# Patient Record
Sex: MALE | Race: WHITE | Employment: OTHER | URBAN - METROPOLITAN AREA
[De-identification: names, ages, dates, MRNs, and addresses within clinical notes are randomized per-mention and may not be internally consistent; named-entity substitution may affect disease eponyms.]

---

## 2017-02-10 ENCOUNTER — OFFICE VISIT (OUTPATIENT)
Dept: ORTHOPEDIC SURGERY | Age: 77
End: 2017-02-10

## 2017-02-10 VITALS — WEIGHT: 179.9 LBS | HEIGHT: 66 IN | BODY MASS INDEX: 28.91 KG/M2

## 2017-02-10 DIAGNOSIS — M54.2 NECK PAIN: ICD-10-CM

## 2017-02-10 DIAGNOSIS — M47.812 SPONDYLOSIS OF CERVICAL REGION WITHOUT MYELOPATHY OR RADICULOPATHY: ICD-10-CM

## 2017-02-10 DIAGNOSIS — R20.2 PARESTHESIA OF RIGHT UPPER EXTREMITY: ICD-10-CM

## 2017-02-10 DIAGNOSIS — M50.30 DDD (DEGENERATIVE DISC DISEASE), CERVICAL: Primary | ICD-10-CM

## 2017-02-10 PROCEDURE — 72040 X-RAY EXAM NECK SPINE 2-3 VW: CPT | Performed by: PHYSICAL MEDICINE & REHABILITATION

## 2017-02-10 PROCEDURE — 99204 OFFICE O/P NEW MOD 45 MIN: CPT | Performed by: PHYSICAL MEDICINE & REHABILITATION

## 2017-02-14 ENCOUNTER — OFFICE VISIT (OUTPATIENT)
Dept: CARDIOLOGY CLINIC | Age: 77
End: 2017-02-14

## 2017-02-14 VITALS
HEIGHT: 66 IN | BODY MASS INDEX: 29.73 KG/M2 | WEIGHT: 185 LBS | HEART RATE: 64 BPM | DIASTOLIC BLOOD PRESSURE: 72 MMHG | OXYGEN SATURATION: 95 % | SYSTOLIC BLOOD PRESSURE: 140 MMHG

## 2017-02-14 DIAGNOSIS — E78.2 MIXED HYPERLIPIDEMIA: Chronic | ICD-10-CM

## 2017-02-14 DIAGNOSIS — I25.10 CORONARY ARTERY DISEASE INVOLVING NATIVE CORONARY ARTERY OF NATIVE HEART WITHOUT ANGINA PECTORIS: Chronic | ICD-10-CM

## 2017-02-14 DIAGNOSIS — I10 ESSENTIAL HYPERTENSION: Primary | Chronic | ICD-10-CM

## 2017-02-14 PROCEDURE — 99214 OFFICE O/P EST MOD 30 MIN: CPT | Performed by: INTERNAL MEDICINE

## 2017-02-14 RX ORDER — CHLORTHALIDONE 25 MG/1
TABLET ORAL
Qty: 30 TABLET | Refills: 6 | Status: SHIPPED | OUTPATIENT
Start: 2017-02-14 | End: 2017-02-21

## 2017-02-15 ENCOUNTER — TELEPHONE (OUTPATIENT)
Dept: ORTHOPEDIC SURGERY | Age: 77
End: 2017-02-15

## 2017-02-17 ENCOUNTER — OFFICE VISIT (OUTPATIENT)
Dept: ORTHOPEDIC SURGERY | Age: 77
End: 2017-02-17

## 2017-02-17 VITALS
WEIGHT: 184.97 LBS | HEIGHT: 66 IN | SYSTOLIC BLOOD PRESSURE: 194 MMHG | BODY MASS INDEX: 29.73 KG/M2 | HEART RATE: 65 BPM | DIASTOLIC BLOOD PRESSURE: 87 MMHG

## 2017-02-17 DIAGNOSIS — G95.89 CERVICAL CORD MYELOMALACIA (HCC): Primary | ICD-10-CM

## 2017-02-17 DIAGNOSIS — M25.512 CHRONIC PAIN OF BOTH SHOULDERS: ICD-10-CM

## 2017-02-17 DIAGNOSIS — M25.511 CHRONIC PAIN OF BOTH SHOULDERS: ICD-10-CM

## 2017-02-17 DIAGNOSIS — G89.29 CHRONIC PAIN OF BOTH SHOULDERS: ICD-10-CM

## 2017-02-17 DIAGNOSIS — M54.2 NECK PAIN: ICD-10-CM

## 2017-02-17 DIAGNOSIS — G95.20 CERVICAL SPINAL CORD COMPRESSION (HCC): ICD-10-CM

## 2017-02-17 PROCEDURE — 99213 OFFICE O/P EST LOW 20 MIN: CPT | Performed by: PHYSICAL MEDICINE & REHABILITATION

## 2017-02-17 RX ORDER — ATENOLOL 25 MG/1
25 TABLET ORAL DAILY
Qty: 90 TABLET | Refills: 3 | Status: SHIPPED | OUTPATIENT
Start: 2017-02-17 | End: 2018-02-28

## 2017-02-17 RX ORDER — ALLOPURINOL 100 MG/1
150 TABLET ORAL DAILY
Qty: 135 TABLET | Refills: 3 | Status: SHIPPED | OUTPATIENT
Start: 2017-02-17 | End: 2017-12-26 | Stop reason: SDUPTHER

## 2017-02-17 RX ORDER — GLIPIZIDE AND METFORMIN HCL 5; 500 MG/1; MG/1
TABLET, FILM COATED ORAL
Qty: 135 TABLET | Refills: 3 | Status: SHIPPED | OUTPATIENT
Start: 2017-02-17 | End: 2017-10-16 | Stop reason: SDUPTHER

## 2017-02-17 RX ORDER — LISINOPRIL 20 MG/1
20 TABLET ORAL DAILY
Qty: 90 TABLET | Refills: 3 | Status: SHIPPED | OUTPATIENT
Start: 2017-02-17 | End: 2017-12-26 | Stop reason: SDUPTHER

## 2017-02-17 RX ORDER — ATORVASTATIN CALCIUM 20 MG/1
20 TABLET, FILM COATED ORAL DAILY
Qty: 90 TABLET | Refills: 3 | Status: SHIPPED | OUTPATIENT
Start: 2017-02-17 | End: 2017-12-26 | Stop reason: SDUPTHER

## 2017-02-17 RX ORDER — LEVOTHYROXINE SODIUM 88 UG/1
88 TABLET ORAL DAILY
Qty: 90 TABLET | Refills: 3 | Status: SHIPPED | OUTPATIENT
Start: 2017-02-17 | End: 2017-12-26 | Stop reason: SDUPTHER

## 2017-02-20 ENCOUNTER — OFFICE VISIT (OUTPATIENT)
Dept: ORTHOPEDIC SURGERY | Age: 77
End: 2017-02-20

## 2017-02-20 VITALS
HEART RATE: 60 BPM | TEMPERATURE: 98 F | WEIGHT: 184 LBS | SYSTOLIC BLOOD PRESSURE: 164 MMHG | DIASTOLIC BLOOD PRESSURE: 81 MMHG | BODY MASS INDEX: 29.57 KG/M2 | HEIGHT: 66 IN

## 2017-02-20 DIAGNOSIS — M48.02 CERVICAL STENOSIS OF SPINAL CANAL: Primary | ICD-10-CM

## 2017-02-20 PROCEDURE — 99214 OFFICE O/P EST MOD 30 MIN: CPT | Performed by: ORTHOPAEDIC SURGERY

## 2017-02-21 ENCOUNTER — OFFICE VISIT (OUTPATIENT)
Dept: INTERNAL MEDICINE | Age: 77
End: 2017-02-21

## 2017-02-21 VITALS
HEIGHT: 66 IN | RESPIRATION RATE: 12 BRPM | WEIGHT: 185 LBS | BODY MASS INDEX: 29.73 KG/M2 | HEART RATE: 84 BPM | SYSTOLIC BLOOD PRESSURE: 128 MMHG | DIASTOLIC BLOOD PRESSURE: 80 MMHG

## 2017-02-21 DIAGNOSIS — I10 ESSENTIAL HYPERTENSION: Primary | Chronic | ICD-10-CM

## 2017-02-21 PROCEDURE — 99213 OFFICE O/P EST LOW 20 MIN: CPT | Performed by: INTERNAL MEDICINE

## 2017-02-21 RX ORDER — CITALOPRAM 20 MG/1
20 TABLET ORAL DAILY
Qty: 90 TABLET | Refills: 3 | Status: SHIPPED | OUTPATIENT
Start: 2017-02-21 | End: 2017-07-28

## 2017-02-21 RX ORDER — AMLODIPINE BESYLATE 2.5 MG/1
2.5 TABLET ORAL DAILY
Qty: 90 TABLET | Refills: 3 | Status: SHIPPED | OUTPATIENT
Start: 2017-02-21 | End: 2019-01-17 | Stop reason: SDUPTHER

## 2017-02-22 ENCOUNTER — TELEPHONE (OUTPATIENT)
Dept: ORTHOPEDIC SURGERY | Age: 77
End: 2017-02-22

## 2017-03-03 ENCOUNTER — TELEPHONE (OUTPATIENT)
Dept: INTERNAL MEDICINE | Age: 77
End: 2017-03-03

## 2017-04-27 ENCOUNTER — OFFICE VISIT (OUTPATIENT)
Dept: INTERNAL MEDICINE | Age: 77
End: 2017-04-27

## 2017-04-27 VITALS
HEIGHT: 66 IN | HEART RATE: 64 BPM | SYSTOLIC BLOOD PRESSURE: 132 MMHG | RESPIRATION RATE: 12 BRPM | BODY MASS INDEX: 29.09 KG/M2 | WEIGHT: 181 LBS | DIASTOLIC BLOOD PRESSURE: 78 MMHG

## 2017-04-27 DIAGNOSIS — E11.9 TYPE 2 DIABETES MELLITUS WITHOUT COMPLICATION, WITHOUT LONG-TERM CURRENT USE OF INSULIN (HCC): ICD-10-CM

## 2017-04-27 DIAGNOSIS — I10 ESSENTIAL HYPERTENSION: Chronic | ICD-10-CM

## 2017-04-27 DIAGNOSIS — Z01.818 PRE-OP EXAM: Primary | ICD-10-CM

## 2017-04-27 PROCEDURE — 99214 OFFICE O/P EST MOD 30 MIN: CPT | Performed by: INTERNAL MEDICINE

## 2017-04-27 PROCEDURE — 93000 ELECTROCARDIOGRAM COMPLETE: CPT | Performed by: INTERNAL MEDICINE

## 2017-07-20 PROBLEM — M48.02 CERVICAL STENOSIS OF SPINAL CANAL: Status: RESOLVED | Noted: 2017-02-20 | Resolved: 2017-07-20

## 2017-07-21 ENCOUNTER — TELEPHONE (OUTPATIENT)
Dept: INTERNAL MEDICINE | Age: 77
End: 2017-07-21

## 2017-07-21 DIAGNOSIS — E03.9 HYPOTHYROIDISM, UNSPECIFIED TYPE: ICD-10-CM

## 2017-07-21 DIAGNOSIS — I25.10 CORONARY ARTERY DISEASE INVOLVING NATIVE CORONARY ARTERY OF NATIVE HEART WITHOUT ANGINA PECTORIS: Chronic | ICD-10-CM

## 2017-07-21 DIAGNOSIS — I10 ESSENTIAL HYPERTENSION: Chronic | ICD-10-CM

## 2017-07-21 DIAGNOSIS — E78.2 MIXED HYPERLIPIDEMIA: Primary | Chronic | ICD-10-CM

## 2017-07-21 DIAGNOSIS — Z12.5 SPECIAL SCREENING FOR MALIGNANT NEOPLASM OF PROSTATE: ICD-10-CM

## 2017-07-21 DIAGNOSIS — E11.9 TYPE 2 DIABETES MELLITUS WITHOUT COMPLICATION, WITHOUT LONG-TERM CURRENT USE OF INSULIN (HCC): ICD-10-CM

## 2017-07-24 DIAGNOSIS — I25.10 CORONARY ARTERY DISEASE INVOLVING NATIVE CORONARY ARTERY OF NATIVE HEART WITHOUT ANGINA PECTORIS: Chronic | ICD-10-CM

## 2017-07-24 DIAGNOSIS — Z12.5 SPECIAL SCREENING FOR MALIGNANT NEOPLASM OF PROSTATE: ICD-10-CM

## 2017-07-24 DIAGNOSIS — E11.9 TYPE 2 DIABETES MELLITUS WITHOUT COMPLICATION, WITHOUT LONG-TERM CURRENT USE OF INSULIN (HCC): ICD-10-CM

## 2017-07-24 DIAGNOSIS — R20.2 PARESTHESIA OF RIGHT UPPER EXTREMITY: ICD-10-CM

## 2017-07-24 DIAGNOSIS — I10 ESSENTIAL HYPERTENSION: Chronic | ICD-10-CM

## 2017-07-24 DIAGNOSIS — M47.812 SPONDYLOSIS OF CERVICAL REGION WITHOUT MYELOPATHY OR RADICULOPATHY: ICD-10-CM

## 2017-07-24 DIAGNOSIS — E03.9 HYPOTHYROIDISM, UNSPECIFIED TYPE: ICD-10-CM

## 2017-07-24 DIAGNOSIS — M54.2 NECK PAIN: ICD-10-CM

## 2017-07-24 DIAGNOSIS — M50.30 DDD (DEGENERATIVE DISC DISEASE), CERVICAL: ICD-10-CM

## 2017-07-24 DIAGNOSIS — E78.2 MIXED HYPERLIPIDEMIA: Chronic | ICD-10-CM

## 2017-07-24 LAB
A/G RATIO: 1.5 (ref 1.1–2.2)
ALBUMIN SERPL-MCNC: 4.1 G/DL (ref 3.4–5)
ALP BLD-CCNC: 59 U/L (ref 40–129)
ALT SERPL-CCNC: 20 U/L (ref 10–40)
ANION GAP SERPL CALCULATED.3IONS-SCNC: 15 MMOL/L (ref 3–16)
AST SERPL-CCNC: 24 U/L (ref 15–37)
BASOPHILS ABSOLUTE: 0.1 K/UL (ref 0–0.2)
BASOPHILS RELATIVE PERCENT: 0.8 %
BILIRUB SERPL-MCNC: 0.6 MG/DL (ref 0–1)
BUN BLDV-MCNC: 19 MG/DL (ref 7–20)
CALCIUM SERPL-MCNC: 9.1 MG/DL (ref 8.3–10.6)
CHLORIDE BLD-SCNC: 102 MMOL/L (ref 99–110)
CHOLESTEROL, TOTAL: 134 MG/DL (ref 0–199)
CO2: 24 MMOL/L (ref 21–32)
CREAT SERPL-MCNC: 0.9 MG/DL (ref 0.8–1.3)
EOSINOPHILS ABSOLUTE: 0.4 K/UL (ref 0–0.6)
EOSINOPHILS RELATIVE PERCENT: 5.1 %
GFR AFRICAN AMERICAN: >60
GFR NON-AFRICAN AMERICAN: >60
GLOBULIN: 2.7 G/DL
GLUCOSE BLD-MCNC: 127 MG/DL (ref 70–99)
HCT VFR BLD CALC: 40.6 % (ref 40.5–52.5)
HDLC SERPL-MCNC: 28 MG/DL (ref 40–60)
HEMOGLOBIN: 13.7 G/DL (ref 13.5–17.5)
LDL CHOLESTEROL CALCULATED: 75 MG/DL
LYMPHOCYTES ABSOLUTE: 2.7 K/UL (ref 1–5.1)
LYMPHOCYTES RELATIVE PERCENT: 36.3 %
MCH RBC QN AUTO: 32.2 PG (ref 26–34)
MCHC RBC AUTO-ENTMCNC: 33.7 G/DL (ref 31–36)
MCV RBC AUTO: 95.5 FL (ref 80–100)
MONOCYTES ABSOLUTE: 0.7 K/UL (ref 0–1.3)
MONOCYTES RELATIVE PERCENT: 9 %
NEUTROPHILS ABSOLUTE: 3.7 K/UL (ref 1.7–7.7)
NEUTROPHILS RELATIVE PERCENT: 48.8 %
PDW BLD-RTO: 15.2 % (ref 12.4–15.4)
PLATELET # BLD: 196 K/UL (ref 135–450)
PMV BLD AUTO: 8.4 FL (ref 5–10.5)
POTASSIUM SERPL-SCNC: 4.8 MMOL/L (ref 3.5–5.1)
PROSTATE SPECIFIC ANTIGEN: 0.98 NG/ML (ref 0–4)
RBC # BLD: 4.25 M/UL (ref 4.2–5.9)
SODIUM BLD-SCNC: 141 MMOL/L (ref 136–145)
TOTAL CK: 77 U/L (ref 39–308)
TOTAL PROTEIN: 6.8 G/DL (ref 6.4–8.2)
TRIGL SERPL-MCNC: 154 MG/DL (ref 0–150)
TSH SERPL DL<=0.05 MIU/L-ACNC: 1.09 UIU/ML (ref 0.27–4.2)
VLDLC SERPL CALC-MCNC: 31 MG/DL
WBC # BLD: 7.5 K/UL (ref 4–11)

## 2017-07-25 LAB
ESTIMATED AVERAGE GLUCOSE: 137 MG/DL
HBA1C MFR BLD: 6.4 %

## 2017-07-28 ENCOUNTER — OFFICE VISIT (OUTPATIENT)
Dept: INTERNAL MEDICINE | Age: 77
End: 2017-07-28

## 2017-07-28 VITALS
HEART RATE: 48 BPM | HEIGHT: 66 IN | DIASTOLIC BLOOD PRESSURE: 66 MMHG | WEIGHT: 175 LBS | RESPIRATION RATE: 12 BRPM | BODY MASS INDEX: 28.12 KG/M2 | SYSTOLIC BLOOD PRESSURE: 128 MMHG

## 2017-07-28 DIAGNOSIS — E03.9 HYPOTHYROIDISM, UNSPECIFIED TYPE: ICD-10-CM

## 2017-07-28 DIAGNOSIS — Z12.11 ENCOUNTER FOR SCREENING COLONOSCOPY: ICD-10-CM

## 2017-07-28 DIAGNOSIS — I25.10 CORONARY ARTERY DISEASE INVOLVING NATIVE CORONARY ARTERY OF NATIVE HEART WITHOUT ANGINA PECTORIS: Chronic | ICD-10-CM

## 2017-07-28 DIAGNOSIS — E11.9 TYPE 2 DIABETES MELLITUS WITHOUT COMPLICATION, WITHOUT LONG-TERM CURRENT USE OF INSULIN (HCC): ICD-10-CM

## 2017-07-28 DIAGNOSIS — I10 ESSENTIAL HYPERTENSION: Chronic | ICD-10-CM

## 2017-07-28 DIAGNOSIS — Z00.00 MEDICARE ANNUAL WELLNESS VISIT, SUBSEQUENT: Primary | ICD-10-CM

## 2017-07-28 DIAGNOSIS — E78.2 MIXED HYPERLIPIDEMIA: Chronic | ICD-10-CM

## 2017-07-28 LAB
BILIRUBIN, POC: NORMAL
BLOOD URINE, POC: NORMAL
CLARITY, POC: CLEAR
COLOR, POC: YELLOW
CREATININE URINE: 60.4 MG/DL (ref 39–259)
GLUCOSE URINE, POC: NORMAL
KETONES, POC: NORMAL
LEUKOCYTE EST, POC: NORMAL
MICROALBUMIN UR-MCNC: <1.2 MG/DL
MICROALBUMIN/CREAT UR-RTO: NORMAL MG/G (ref 0–30)
NITRITE, POC: NORMAL
PH, POC: 6.5
PROTEIN, POC: NORMAL
SPECIFIC GRAVITY, POC: 1.01
UROBILINOGEN, POC: NORMAL

## 2017-07-28 PROCEDURE — 93000 ELECTROCARDIOGRAM COMPLETE: CPT | Performed by: INTERNAL MEDICINE

## 2017-07-28 PROCEDURE — G0439 PPPS, SUBSEQ VISIT: HCPCS | Performed by: INTERNAL MEDICINE

## 2017-07-28 PROCEDURE — 81002 URINALYSIS NONAUTO W/O SCOPE: CPT | Performed by: INTERNAL MEDICINE

## 2017-07-28 RX ORDER — RANITIDINE 150 MG/1
150 TABLET ORAL
COMMUNITY
End: 2019-02-01 | Stop reason: CLARIF

## 2017-07-28 ASSESSMENT — LIFESTYLE VARIABLES
HAS A RELATIVE, FRIEND, DOCTOR, OR ANOTHER HEALTH PROFESSIONAL EXPRESSED CONCERN ABOUT YOUR DRINKING OR SUGGESTED YOU CUT DOWN: 0
HOW MANY STANDARD DRINKS CONTAINING ALCOHOL DO YOU HAVE ON A TYPICAL DAY: 0
AUDIT TOTAL SCORE: 1
HAVE YOU OR SOMEONE ELSE BEEN INJURED AS A RESULT OF YOUR DRINKING: 0
HOW OFTEN DO YOU HAVE SIX OR MORE DRINKS ON ONE OCCASION: 0
HOW OFTEN DURING THE LAST YEAR HAVE YOU BEEN UNABLE TO REMEMBER WHAT HAPPENED THE NIGHT BEFORE BECAUSE YOU HAD BEEN DRINKING: 0
HOW OFTEN DURING THE LAST YEAR HAVE YOU NEEDED AN ALCOHOLIC DRINK FIRST THING IN THE MORNING TO GET YOURSELF GOING AFTER A NIGHT OF HEAVY DRINKING: 0
HOW OFTEN DURING THE LAST YEAR HAVE YOU HAD A FEELING OF GUILT OR REMORSE AFTER DRINKING: 0
HOW OFTEN DURING THE LAST YEAR HAVE YOU FAILED TO DO WHAT WAS NORMALLY EXPECTED FROM YOU BECAUSE OF DRINKING: 0
AUDIT-C TOTAL SCORE: 1
HOW OFTEN DURING THE LAST YEAR HAVE YOU FOUND THAT YOU WERE NOT ABLE TO STOP DRINKING ONCE YOU HAD STARTED: 0
HOW OFTEN DO YOU HAVE A DRINK CONTAINING ALCOHOL: 1

## 2017-07-28 ASSESSMENT — PATIENT HEALTH QUESTIONNAIRE - PHQ9: SUM OF ALL RESPONSES TO PHQ QUESTIONS 1-9: 0

## 2017-07-28 ASSESSMENT — ANXIETY QUESTIONNAIRES: GAD7 TOTAL SCORE: 0

## 2017-09-14 ENCOUNTER — TELEPHONE (OUTPATIENT)
Dept: INTERNAL MEDICINE | Age: 77
End: 2017-09-14

## 2017-09-19 ENCOUNTER — OFFICE VISIT (OUTPATIENT)
Dept: INTERNAL MEDICINE | Age: 77
End: 2017-09-19

## 2017-09-19 ENCOUNTER — HOSPITAL ENCOUNTER (OUTPATIENT)
Dept: OTHER | Age: 77
Discharge: HOME | End: 2017-09-19
Attending: INTERNAL MEDICINE | Admitting: INTERNAL MEDICINE

## 2017-09-19 VITALS
BODY MASS INDEX: 28.93 KG/M2 | WEIGHT: 180 LBS | HEIGHT: 66 IN | DIASTOLIC BLOOD PRESSURE: 78 MMHG | HEART RATE: 66 BPM | SYSTOLIC BLOOD PRESSURE: 130 MMHG | RESPIRATION RATE: 12 BRPM

## 2017-09-19 DIAGNOSIS — M79.672 LEFT FOOT PAIN: Primary | ICD-10-CM

## 2017-09-19 DIAGNOSIS — E11.9 TYPE 2 DIABETES MELLITUS WITHOUT COMPLICATION, WITHOUT LONG-TERM CURRENT USE OF INSULIN (HCC): ICD-10-CM

## 2017-09-19 DIAGNOSIS — I10 ESSENTIAL HYPERTENSION: Chronic | ICD-10-CM

## 2017-09-19 DIAGNOSIS — M79.672 LEFT FOOT PAIN: ICD-10-CM

## 2017-09-19 PROCEDURE — 99213 OFFICE O/P EST LOW 20 MIN: CPT | Performed by: INTERNAL MEDICINE

## 2017-10-16 ENCOUNTER — TELEPHONE (OUTPATIENT)
Dept: INTERNAL MEDICINE | Age: 77
End: 2017-10-16

## 2017-10-16 RX ORDER — GLIPIZIDE AND METFORMIN HCL 5; 500 MG/1; MG/1
2 TABLET, FILM COATED ORAL
Qty: 120 TABLET | Refills: 12 | Status: SHIPPED | OUTPATIENT
Start: 2017-10-16 | End: 2018-02-28 | Stop reason: CLARIF

## 2017-10-23 ENCOUNTER — TELEPHONE (OUTPATIENT)
Dept: INTERNAL MEDICINE | Age: 77
End: 2017-10-23

## 2017-10-23 NOTE — TELEPHONE ENCOUNTER
He had a sarina horse this am and when he got up he was very dizzy and had to sit on the floor   The dizziness is gone but her has pres in head  He is congested   He thinks he should be seen   When do you want to see him

## 2017-11-10 ENCOUNTER — TELEPHONE (OUTPATIENT)
Dept: INTERNAL MEDICINE | Age: 77
End: 2017-11-10

## 2017-11-13 ENCOUNTER — TELEPHONE (OUTPATIENT)
Dept: INTERNAL MEDICINE | Age: 77
End: 2017-11-13

## 2017-11-13 NOTE — TELEPHONE ENCOUNTER
The pharmacy called   Atenolol 25 on back order   Do you want to do 50 mg and have him split it?    RX# [phone]

## 2017-11-14 ENCOUNTER — TELEPHONE (OUTPATIENT)
Dept: INTERNAL MEDICINE | Age: 77
End: 2017-11-14

## 2017-12-27 RX ORDER — ATENOLOL 50 MG/1
TABLET ORAL
Qty: 45 TABLET | Refills: 3 | Status: SHIPPED | OUTPATIENT
Start: 2017-12-27 | End: 2019-01-17 | Stop reason: SDUPTHER

## 2017-12-27 RX ORDER — LEVOTHYROXINE SODIUM 88 UG/1
TABLET ORAL
Qty: 90 TABLET | Refills: 3 | Status: SHIPPED | OUTPATIENT
Start: 2017-12-27 | End: 2019-01-17 | Stop reason: SDUPTHER

## 2017-12-27 RX ORDER — ALLOPURINOL 100 MG/1
TABLET ORAL
Qty: 135 TABLET | Refills: 3 | Status: SHIPPED | OUTPATIENT
Start: 2017-12-27 | End: 2019-01-17 | Stop reason: SDUPTHER

## 2017-12-27 RX ORDER — ATORVASTATIN CALCIUM 20 MG/1
TABLET, FILM COATED ORAL
Qty: 90 TABLET | Refills: 3 | Status: SHIPPED | OUTPATIENT
Start: 2017-12-27 | End: 2019-01-17 | Stop reason: SDUPTHER

## 2017-12-27 RX ORDER — LISINOPRIL 20 MG/1
TABLET ORAL
Qty: 90 TABLET | Refills: 3 | Status: SHIPPED | OUTPATIENT
Start: 2017-12-27 | End: 2019-01-17 | Stop reason: SDUPTHER

## 2018-01-26 ENCOUNTER — TELEPHONE (OUTPATIENT)
Dept: INTERNAL MEDICINE | Age: 78
End: 2018-01-26

## 2018-01-26 DIAGNOSIS — E11.9 TYPE 2 DIABETES MELLITUS WITHOUT COMPLICATION, WITHOUT LONG-TERM CURRENT USE OF INSULIN (HCC): Primary | ICD-10-CM

## 2018-01-29 DIAGNOSIS — E11.9 TYPE 2 DIABETES MELLITUS WITHOUT COMPLICATION, WITHOUT LONG-TERM CURRENT USE OF INSULIN (HCC): ICD-10-CM

## 2018-01-29 LAB
ESTIMATED AVERAGE GLUCOSE: 137 MG/DL
GLUCOSE BLD-MCNC: 117 MG/DL (ref 70–99)
HBA1C MFR BLD: 6.4 %

## 2018-01-30 ENCOUNTER — OFFICE VISIT (OUTPATIENT)
Dept: INTERNAL MEDICINE | Age: 78
End: 2018-01-30

## 2018-01-30 VITALS
RESPIRATION RATE: 12 BRPM | HEART RATE: 70 BPM | DIASTOLIC BLOOD PRESSURE: 80 MMHG | BODY MASS INDEX: 28.72 KG/M2 | SYSTOLIC BLOOD PRESSURE: 120 MMHG | HEIGHT: 67 IN | WEIGHT: 183 LBS

## 2018-01-30 DIAGNOSIS — E11.9 TYPE 2 DIABETES MELLITUS WITHOUT COMPLICATION, WITHOUT LONG-TERM CURRENT USE OF INSULIN (HCC): Primary | ICD-10-CM

## 2018-01-30 DIAGNOSIS — I25.10 CORONARY ARTERY DISEASE INVOLVING NATIVE CORONARY ARTERY OF NATIVE HEART WITHOUT ANGINA PECTORIS: Chronic | ICD-10-CM

## 2018-01-30 DIAGNOSIS — E78.2 MIXED HYPERLIPIDEMIA: Chronic | ICD-10-CM

## 2018-01-30 DIAGNOSIS — E03.9 HYPOTHYROIDISM, UNSPECIFIED TYPE: ICD-10-CM

## 2018-01-30 DIAGNOSIS — I10 ESSENTIAL HYPERTENSION: Chronic | ICD-10-CM

## 2018-01-30 PROCEDURE — 99213 OFFICE O/P EST LOW 20 MIN: CPT | Performed by: INTERNAL MEDICINE

## 2018-01-30 RX ORDER — LANSOPRAZOLE 15 MG/1
15 CAPSULE, DELAYED RELEASE ORAL
COMMUNITY
End: 2019-02-01 | Stop reason: CLARIF

## 2018-01-30 NOTE — PROGRESS NOTES
Chief Complaint   Patient presents with    Diabetes        HPI:  Patient comes in for follow up of type 2 diabetes maintained on glipizide/metformin bid. Denies polyuria, polydipsia, and polyphagia. He also has discomfort in his left trapezius muscle which is not associated with chest tightness or shortness of breath    Social History     Social History    Marital status:      Spouse name: N/A    Number of children: 4    Years of education: N/A     Occupational History    Not on file.      Social History Main Topics    Smoking status: Never Smoker    Smokeless tobacco: Never Used    Alcohol use No      Comment: occasionally    Drug use: Unknown    Sexual activity: Not on file     Other Topics Concern    Not on file     Social History Narrative    Living Will:  Yes     Patient Active Problem List   Diagnosis    Type 2 diabetes mellitus ([facility] [address]    HTN (hypertension)    Hyperlipidemia    Hypothyroidism    CAD (coronary artery disease)     Past Medical History:   Diagnosis Date    Basal cell carcinoma Jan., 2004 & Feb., 2007    resection of multiple , Ca lip    Bell's palsy Nov., 2003    Cardiac catheterization as cause of abnormal reaction of patient, or of later complication, without mention of misadventure at time of procedure Dec., 2011    Dr. Shanon Morgan - R-30%,Circ-nl,LAD-75%,diag-95%,EF-60%    Diverticulosis     Gastric AVM     GERD (gastroesophageal reflux disease)     HTN (hypertension)     Hyperlipidemia     Hypothyroidism 2003    Melanoma [facility]) *Aug., 2016    Dr. Will Cassidy NIDDM (non-insulin dependent diabetes mellitus) 2005    Spinal stenosis *Febr., 2017    Severe at C3-C4 with severe cord compression and myelopathy     Past Surgical History:   Procedure Laterality Date   [facility]    normal, LAD:40%, 50%    CARDIAC CATHETERIZATION  Dec., 2011    Dr. Shanon Morgan - LAD-75%,duag-95%, R-Normal,Circ-normal    CERVICAL [facility]GERY  *May, 2017    Dr. Paolo Randhawa - ACDF 3     No current facility-administered medications for this visit. No Known Allergies    Physical Exam:   /80 (Site: Left Arm, Position: Sitting, Cuff Size: Medium Adult)   Pulse 70   Resp 12   Ht 5' 7\" (1.702 m)   Wt 183 lb (83 kg)   BMI 28.66 kg/m²   General appearance: alert, appears stated age and cooperative  Lungs: clear to auscultation bilaterally  Heart: regular rate and rhythm, S1, S2 normal, no murmur, click, rub or gallop  Extremities: extremities normal, atraumatic, no cyanosis or edema  Other: N/A    Lab Review:   Orders Only on 01/29/2018   Component Date Value    Glucose 01/29/2018 117*    Hemoglobin A1C 01/29/2018 6.4     eAG 01/29/2018 137.0          Assessment: Type 2 diabetes - well controlled    Plan: To come in for a more thorough exam.  Ortopedic referral     ERNESTO Matute MD

## 2018-01-30 NOTE — PATIENT INSTRUCTIONS
BP Readings from Last 2 Encounters:   10/23/17 123/63   09/19/17 130/78     Hemoglobin A1C (%)   Date Value   01/29/2018 6.4     Microscopic Examination (no units)   Date Value   10/23/2017 Not Indicated     LDL Calculated (mg/dL)   Date Value   07/24/2017 75              Tobacco use:  Patient  reports that he has never smoked. He has never used smokeless tobacco.    If Smoker - Cessation materials given? NA    Is Daily aspirin on medication list?:  No    Diabetic retinal exam done? Yes   If yes, document in Health Maintenance. Monofilament placed on counter? Yes    Shoes and socks removed? Yes    BP taken with correct size cuff? Yes   Repeated if > 130/80 Yes     Microalbumin performed if applicable?   Yes

## 2018-02-02 ENCOUNTER — OFFICE VISIT (OUTPATIENT)
Dept: CARDIOLOGY CLINIC | Age: 78
End: 2018-02-02

## 2018-02-02 VITALS
DIASTOLIC BLOOD PRESSURE: 90 MMHG | OXYGEN SATURATION: 96 % | SYSTOLIC BLOOD PRESSURE: 150 MMHG | HEIGHT: 66 IN | WEIGHT: 183 LBS | BODY MASS INDEX: 29.41 KG/M2 | HEART RATE: 62 BPM

## 2018-02-02 DIAGNOSIS — I25.83 CORONARY ARTERY DISEASE DUE TO LIPID RICH PLAQUE: Primary | ICD-10-CM

## 2018-02-02 DIAGNOSIS — I10 ESSENTIAL HYPERTENSION: ICD-10-CM

## 2018-02-02 DIAGNOSIS — I25.10 CORONARY ARTERY DISEASE DUE TO LIPID RICH PLAQUE: Primary | ICD-10-CM

## 2018-02-02 DIAGNOSIS — E78.5 DYSLIPIDEMIA: ICD-10-CM

## 2018-02-02 PROCEDURE — 99213 OFFICE O/P EST LOW 20 MIN: CPT | Performed by: INTERNAL MEDICINE

## 2018-02-02 RX ORDER — ASPIRIN 81 MG/1
81 TABLET, CHEWABLE ORAL EVERY OTHER DAY
COMMUNITY
End: 2020-08-10

## 2018-02-02 NOTE — PROGRESS NOTES
2017); and Colonoscopy (*Dec.5, 2017 ( prn )). Social History:   reports that he has never smoked. He has never used smokeless tobacco. He reports that he drinks alcohol. Family History:  family history includes Other (age of onset: 80) in his mother; Other (age of onset: 80) in his father. Home Medications:  Prior to Admission medications    Medication Sig Start Date End Date Taking?  Authorizing Provider   aspirin 81 MG chewable tablet Take 81 mg by mouth every other day   Yes Historical Provider, MD   lansoprazole (PREVACID) 15 MG delayed release capsule Take 15 mg by mouth daily Twice a week   Yes Historical Provider, MD   atorvastatin (LIPITOR) 20 MG tablet TAKE ONE TABLET BY MOUTH ONCE DAILY 12/27/17  Yes V Fany Quinonez MD   atenolol (TENORMIN) 50 MG tablet TAKE ONE-HALF TABLET BY MOUTH ONCE DAILY 12/27/17  Yes V Fany Quinonez MD   levothyroxine (SYNTHROID) 88 MCG tablet TAKE ONE TABLET BY MOUTH ONCE DAILY 12/27/17  Yes V Fany Quinonez MD   lisinopril (PRINIVIL;ZESTRIL) 20 MG tablet TAKE ONE TABLET BY MOUTH ONCE DAILY 12/27/17  Yes V Fany Quinonez MD   allopurinol (ZYLOPRIM) 100 MG tablet TAKE ONE & ONE-HALF TABLETS BY MOUTH ONCE DAILY 12/27/17  Yes V Fany Quinonez MD   glipiZIDE-metformin (METAGLIP) 5-500 MG per tablet Take 2 tablets by mouth 2 times daily (before meals)  Patient taking differently: Take 1 tablet by mouth 2 times daily (before meals)  10/16/17  Yes Linda Adair MD   ranitidine (ZANTAC) 150 MG tablet Take 150 mg by mouth every other day   Yes Historical Provider, MD   vitamin D (CHOLECALCIFEROL) 1000 UNIT TABS tablet Take 1,000 Units by mouth daily   Yes Historical Provider, MD   glucose blood VI test strips (ONE TOUCH ULTRA TEST) strip USE AS DIRECTED TWICE DAILY 2/17/17  Yes V Fany Quinonez MD   ONE TOUCH LANCETS MISC 1 each by Does not apply route 2 times daily 1/6/16  Yes V Fany Quinonez MD   Multiple Vitamins-Minerals (CENTRUM SILVER PO) Take  by Coronary artery disease due to lipid rich plaque    2. Essential hypertension    3. Dyslipidemia      Coronary artery disease  [facility] 2011 - LAD 75% with flow wire 0.87, D-2 95%, RCA prox 30% treated medically. 2015 plain GXT was normal with no ischemic changes noted. No complaints of chest pain or shortness   Continue current medications    Hypertension:  Blood pressure (!) 150/90, pulse 62, height 5' 6\" (1.676 m), weight 183 lb (83 kg), SpO2 96 %. Elevated at visit today, but normal at home and at most recent doctor's visit  No change in medications - will continue to monitor blood pressure at home    Dyslipidemia  7/2017 TC- 134, TG- 154, HDL- 28, LDL- 75. Stable on Atorvastatin 20 mg daily  Lipids drawn by PCP. Plan:  Stable cardiac status with no concerning cardiac symptoms. 1.  No change in medications  2. Labs drawn through PCP's office  3. Continue risk factor modification behaviors  4. Follow up in one year    Thank you for allowing me to participate in the care of this individual.      Vincent Stokes.  Chloe Garcia M.D., [facility]

## 2018-02-26 ENCOUNTER — TELEPHONE (OUTPATIENT)
Dept: INTERNAL MEDICINE | Age: 78
End: 2018-02-26

## 2018-02-27 ENCOUNTER — TELEPHONE (OUTPATIENT)
Dept: INTERNAL MEDICINE | Age: 78
End: 2018-02-27

## 2018-02-27 NOTE — TELEPHONE ENCOUNTER
Last night got sick to stomach and whoozy while standing to urinate. Sat on the floor, broke out in a cold sweat. Checked his sugar and it was fine  This is the third time it's happened to him in 5 years. Went to ER the first two times and was told it was sarina horses. He wants to know why this is happening - who should he see.

## 2018-02-28 ENCOUNTER — OFFICE VISIT (OUTPATIENT)
Dept: CARDIOLOGY CLINIC | Age: 78
End: 2018-02-28

## 2018-02-28 ENCOUNTER — HOSPITAL ENCOUNTER (OUTPATIENT)
Dept: OTHER | Age: 78
Discharge: HOME | End: 2018-02-28
Attending: NURSE PRACTITIONER | Admitting: NURSE PRACTITIONER

## 2018-02-28 VITALS
BODY MASS INDEX: 28.61 KG/M2 | HEIGHT: 66 IN | OXYGEN SATURATION: 96 % | DIASTOLIC BLOOD PRESSURE: 82 MMHG | HEART RATE: 84 BPM | WEIGHT: 178 LBS | SYSTOLIC BLOOD PRESSURE: 150 MMHG

## 2018-02-28 DIAGNOSIS — R42 LIGHT-HEADED FEELING: Primary | ICD-10-CM

## 2018-02-28 DIAGNOSIS — I10 ESSENTIAL HYPERTENSION: ICD-10-CM

## 2018-02-28 DIAGNOSIS — I25.83 CORONARY ARTERY DISEASE DUE TO LIPID RICH PLAQUE: ICD-10-CM

## 2018-02-28 DIAGNOSIS — E78.2 MIXED HYPERLIPIDEMIA: ICD-10-CM

## 2018-02-28 DIAGNOSIS — I25.10 CORONARY ARTERY DISEASE DUE TO LIPID RICH PLAQUE: ICD-10-CM

## 2018-02-28 LAB
A/G RATIO: 1.6 (ref 1.1–2.2)
ALBUMIN SERPL-MCNC: 4.4 G/DL (ref 3.4–5)
ALP BLD-CCNC: 52 U/L (ref 40–129)
ALT SERPL-CCNC: 21 U/L (ref 10–40)
ANION GAP SERPL CALCULATED.3IONS-SCNC: 15 MMOL/L (ref 3–16)
AST SERPL-CCNC: 26 U/L (ref 15–37)
BILIRUB SERPL-MCNC: 0.8 MG/DL (ref 0–1)
BUN BLDV-MCNC: 19 MG/DL (ref 7–20)
CALCIUM SERPL-MCNC: 9.3 MG/DL (ref 8.3–10.6)
CHLORIDE BLD-SCNC: 100 MMOL/L (ref 99–110)
CO2: 25 MMOL/L (ref 21–32)
CREAT SERPL-MCNC: 1 MG/DL (ref 0.8–1.3)
GFR AFRICAN AMERICAN: >60
GFR NON-AFRICAN AMERICAN: >60
GLOBULIN: 2.8 G/DL
GLUCOSE BLD-MCNC: 125 MG/DL (ref 70–99)
MAGNESIUM: 2 MG/DL (ref 1.8–2.4)
POTASSIUM SERPL-SCNC: 4.9 MMOL/L (ref 3.5–5.1)
SODIUM BLD-SCNC: 140 MMOL/L (ref 136–145)
TOTAL PROTEIN: 7.2 G/DL (ref 6.4–8.2)

## 2018-02-28 PROCEDURE — 99214 OFFICE O/P EST MOD 30 MIN: CPT | Performed by: NURSE PRACTITIONER

## 2018-02-28 PROCEDURE — 93000 ELECTROCARDIOGRAM COMPLETE: CPT | Performed by: NURSE PRACTITIONER

## 2018-02-28 RX ORDER — AZITHROMYCIN 250 MG/1
250 TABLET, FILM COATED ORAL DAILY
COMMUNITY
End: 2019-02-01 | Stop reason: CLARIF

## 2018-02-28 RX ORDER — GLIPIZIDE AND METFORMIN HCL 5; 500 MG/1; MG/1
1 TABLET, FILM COATED ORAL
COMMUNITY
End: 2019-01-17 | Stop reason: SDUPTHER

## 2018-02-28 NOTE — PROGRESS NOTES
(non-insulin dependent diabetes mellitus) 2005    Spinal stenosis *Febr., 2017    Severe at C3-C4 with severe cord compression and myelopathy     Social History:    History   Smoking Status    Never Smoker   Smokeless Tobacco    Never Used     Current Medications:  Current Outpatient Prescriptions   Medication Sig Dispense Refill    azithromycin (ZITHROMAX) 250 MG tablet Take 250 mg by mouth daily      glipiZIDE-metformin (METAGLIP) 5-500 MG per tablet Take 1 tablet by mouth 2 times daily (before meals)      aspirin 81 MG chewable tablet Take 81 mg by mouth every other day      lansoprazole (PREVACID) 15 MG delayed release capsule Take 15 mg by mouth Twice a Week       atorvastatin (LIPITOR) 20 MG tablet TAKE ONE TABLET BY MOUTH ONCE DAILY 90 tablet 3    atenolol (TENORMIN) 50 MG tablet TAKE ONE-HALF TABLET BY MOUTH ONCE DAILY 45 tablet 3    levothyroxine (SYNTHROID) 88 MCG tablet TAKE ONE TABLET BY MOUTH ONCE DAILY 90 tablet 3    lisinopril (PRINIVIL;ZESTRIL) 20 MG tablet TAKE ONE TABLET BY MOUTH ONCE DAILY 90 tablet 3    allopurinol (ZYLOPRIM) 100 MG tablet TAKE ONE & ONE-HALF TABLETS BY MOUTH ONCE DAILY 135 tablet 3    ranitidine (ZANTAC) 150 MG tablet Take 150 mg by mouth Twice a Week       vitamin D (CHOLECALCIFEROL) 1000 UNIT TABS tablet Take 1,000 Units by mouth daily      amLODIPine (NORVASC) 2.5 MG tablet Take 1 tablet by mouth daily 90 tablet 3    Multiple Vitamins-Minerals (CENTRUM SILVER PO) Take  by mouth.  glucose blood VI test strips (ONE TOUCH ULTRA TEST) strip USE AS DIRECTED TWICE DAILY 200 each 3    ONE TOUCH LANCETS MISC 1 each by Does not apply route 2 times daily 200 each 3     No current facility-administered medications for this visit. REVIEW OF SYSTEMS:    CONSTITUTIONAL: No major weight gain or loss, fatigue, weakness, night sweats or fever. HEENT: No new vision difficulties or ringing in the ears. RESPIRATORY: No new SOB, PND, orthopnea or cough. motion abnormalities by echo '14, EF 55%  ~BB / statin / ASA / CCB  ~exercises routinely without symptoms  Stress test, myoview   3. Essential hypertension   ~controlled on arrival ; suboptimal on recheck   ~amlodipine / atenolol / lisinopril     4. Mixed hyperlipidemia   ~HDL low ; trig near goal   ~last A1c 6.4%  ~atorvastatin           I had the opportunity to review the clinical symptoms and presentation of Niels Chanel. Plan:     1. EKG: sinus bradycardia 52, RBBB   ~discussed benefit of having a tilt study with R[facility], no value for test    ~counterpressure maneuvers ; awareness of symptoms then sit down   2. CMP/Mg+ today  3. Exercise myoview stress   4. F/U in four weeks / test dependent     Overall the patient is stable from CV standpoint    I have addresed the patient's cardiac risk factors and adjusted pharmacologic treatment as needed. In addition, I have reinforced the need for patient directed risk factor modification. Further evaluation will be based upon the patient's clinical course and testing results. All questions and concerns were addressed to the patient/family. Alternatives to my treatment were discussed. The patient is not currently smoking. The risks related to smoking were reviewed with the patient. Recommend maintaining a smoke-free lifestyle. Patient is on a beta-blocker  Patient is on an ace-i  Patient is on a statin    Dual Antiplatelet therapy has been recommended / prescribed for this patient. Education conducted on adverse reactions including bleeding was discussed. The patient verbalizes understanding not to stop medications without discussing with us. Discussed exercise: 30-60 minutes 7 days/week. Either goes to the gym or walks 60 min daily   Discussed Low saturated fat diet. SMBG 134 last evening    Thank you for allowing to us to participate in the care of Niels Chanel.     RICARDO Jenkins    Documentation of today's visit sent to PCP

## 2018-03-01 ENCOUNTER — TELEPHONE (OUTPATIENT)
Dept: CARDIOLOGY CLINIC | Age: 78
End: 2018-03-01

## 2018-03-05 ENCOUNTER — HOSPITAL ENCOUNTER (OUTPATIENT)
Dept: NON INVASIVE DIAGNOSTICS | Age: 78
Discharge: HOME | End: 2018-03-05
Attending: INTERNAL MEDICINE | Admitting: INTERNAL MEDICINE

## 2018-03-05 DIAGNOSIS — I25.10 ATHEROSCLEROTIC HEART DISEASE OF NATIVE CORONARY ARTERY WITHOUT ANGINA PECTORIS: ICD-10-CM

## 2018-03-05 LAB
LV EF: 67 %
LVEF MODALITY: NORMAL

## 2018-03-05 NOTE — PROGRESS NOTES
Patient instructed on Mart Protocol Stress Test Procedure including possible side effects and adverse reactions. Verbalizes knowledge and understanding and denies having any questions.

## 2018-05-03 ENCOUNTER — TELEPHONE (OUTPATIENT)
Dept: INTERNAL MEDICINE | Age: 78
End: 2018-05-03

## 2018-05-04 ENCOUNTER — OFFICE VISIT (OUTPATIENT)
Dept: INTERNAL MEDICINE | Age: 78
End: 2018-05-04

## 2018-05-04 ENCOUNTER — HOSPITAL ENCOUNTER (OUTPATIENT)
Dept: OTHER | Age: 78
Discharge: HOME | End: 2018-05-04
Attending: INTERNAL MEDICINE | Admitting: INTERNAL MEDICINE

## 2018-05-04 VITALS
HEART RATE: 66 BPM | RESPIRATION RATE: 12 BRPM | DIASTOLIC BLOOD PRESSURE: 80 MMHG | SYSTOLIC BLOOD PRESSURE: 130 MMHG | BODY MASS INDEX: 29.57 KG/M2 | WEIGHT: 184 LBS | HEIGHT: 66 IN

## 2018-05-04 DIAGNOSIS — R52 PAIN: ICD-10-CM

## 2018-05-04 DIAGNOSIS — M25.512 ACUTE PAIN OF LEFT SHOULDER: Primary | ICD-10-CM

## 2018-05-04 PROCEDURE — 99213 OFFICE O/P EST LOW 20 MIN: CPT | Performed by: INTERNAL MEDICINE

## 2018-05-04 RX ORDER — TRAMADOL HYDROCHLORIDE 50 MG/1
50 TABLET ORAL EVERY 6 HOURS PRN
Qty: 24 TABLET | Refills: 0 | OUTPATIENT
Start: 2018-05-04 | End: 2018-06-03

## 2018-05-04 RX ORDER — TRAMADOL HYDROCHLORIDE 50 MG/1
50 TABLET ORAL EVERY 6 HOURS PRN
COMMUNITY
End: 2019-02-01 | Stop reason: CLARIF

## 2018-05-09 ENCOUNTER — TELEPHONE (OUTPATIENT)
Dept: INTERNAL MEDICINE | Age: 78
End: 2018-05-09

## 2018-05-09 DIAGNOSIS — T14.90XA TRAUMA: Primary | ICD-10-CM

## 2018-05-09 DIAGNOSIS — G44.311 INTRACTABLE ACUTE POST-TRAUMATIC HEADACHE: ICD-10-CM

## 2018-05-13 ENCOUNTER — HOSPITAL ENCOUNTER (OUTPATIENT)
Dept: CT IMAGING | Age: 78
Discharge: HOME | End: 2018-05-13
Attending: INTERNAL MEDICINE | Admitting: INTERNAL MEDICINE

## 2018-05-13 DIAGNOSIS — G44.311 INTRACTABLE ACUTE POST-TRAUMATIC HEADACHE: ICD-10-CM

## 2018-05-13 DIAGNOSIS — T14.90XA TRAUMA: ICD-10-CM

## 2018-05-15 ENCOUNTER — TELEPHONE (OUTPATIENT)
Dept: INTERNAL MEDICINE | Age: 78
End: 2018-05-15

## 2018-07-25 PROBLEM — E78.49 OTHER HYPERLIPIDEMIA: Status: ACTIVE | Noted: 2018-02-02

## 2018-07-26 ENCOUNTER — TELEPHONE (OUTPATIENT)
Dept: INTERNAL MEDICINE | Age: 78
End: 2018-07-26

## 2018-07-26 DIAGNOSIS — E78.49 OTHER HYPERLIPIDEMIA: ICD-10-CM

## 2018-07-26 DIAGNOSIS — I10 ESSENTIAL HYPERTENSION: Primary | ICD-10-CM

## 2018-07-26 DIAGNOSIS — E03.9 HYPOTHYROIDISM, UNSPECIFIED TYPE: ICD-10-CM

## 2018-07-26 DIAGNOSIS — Z12.5 SPECIAL SCREENING FOR MALIGNANT NEOPLASM OF PROSTATE: ICD-10-CM

## 2018-07-26 DIAGNOSIS — E11.9 TYPE 2 DIABETES MELLITUS WITHOUT COMPLICATION, WITHOUT LONG-TERM CURRENT USE OF INSULIN (HCC): ICD-10-CM

## 2018-07-30 DIAGNOSIS — I10 ESSENTIAL HYPERTENSION: ICD-10-CM

## 2018-07-30 DIAGNOSIS — Z12.5 SPECIAL SCREENING FOR MALIGNANT NEOPLASM OF PROSTATE: ICD-10-CM

## 2018-07-30 DIAGNOSIS — E78.49 OTHER HYPERLIPIDEMIA: ICD-10-CM

## 2018-07-30 DIAGNOSIS — R42 LIGHT-HEADED FEELING: ICD-10-CM

## 2018-07-30 DIAGNOSIS — E03.9 HYPOTHYROIDISM, UNSPECIFIED TYPE: ICD-10-CM

## 2018-07-30 DIAGNOSIS — E11.9 TYPE 2 DIABETES MELLITUS WITHOUT COMPLICATION, WITHOUT LONG-TERM CURRENT USE OF INSULIN (HCC): ICD-10-CM

## 2018-07-30 LAB
A/G RATIO: 2 (ref 1.1–2.2)
ALBUMIN SERPL-MCNC: 4.3 G/DL (ref 3.4–5)
ALP BLD-CCNC: 71 U/L (ref 40–129)
ALT SERPL-CCNC: 26 U/L (ref 10–40)
ANION GAP SERPL CALCULATED.3IONS-SCNC: 13 MMOL/L (ref 3–16)
AST SERPL-CCNC: 24 U/L (ref 15–37)
BASOPHILS ABSOLUTE: 0.1 K/UL (ref 0–0.2)
BASOPHILS RELATIVE PERCENT: 1.3 %
BILIRUB SERPL-MCNC: 0.4 MG/DL (ref 0–1)
BUN BLDV-MCNC: 16 MG/DL (ref 7–20)
CALCIUM SERPL-MCNC: 9.1 MG/DL (ref 8.3–10.6)
CHLORIDE BLD-SCNC: 103 MMOL/L (ref 99–110)
CHOLESTEROL, TOTAL: 206 MG/DL (ref 0–199)
CO2: 25 MMOL/L (ref 21–32)
CREAT SERPL-MCNC: 1 MG/DL (ref 0.8–1.3)
EOSINOPHILS ABSOLUTE: 0.3 K/UL (ref 0–0.6)
EOSINOPHILS RELATIVE PERCENT: 5.1 %
GFR AFRICAN AMERICAN: >60
GFR NON-AFRICAN AMERICAN: >60
GLOBULIN: 2.2 G/DL
GLUCOSE BLD-MCNC: 143 MG/DL (ref 70–99)
HCT VFR BLD CALC: 40.8 % (ref 40.5–52.5)
HDLC SERPL-MCNC: 35 MG/DL (ref 40–60)
HEMOGLOBIN: 13.7 G/DL (ref 13.5–17.5)
LDL CHOLESTEROL CALCULATED: 143 MG/DL
LYMPHOCYTES ABSOLUTE: 2 K/UL (ref 1–5.1)
LYMPHOCYTES RELATIVE PERCENT: 31.8 %
MAGNESIUM: 2 MG/DL (ref 1.8–2.4)
MCH RBC QN AUTO: 32.2 PG (ref 26–34)
MCHC RBC AUTO-ENTMCNC: 33.5 G/DL (ref 31–36)
MCV RBC AUTO: 96.1 FL (ref 80–100)
MONOCYTES ABSOLUTE: 0.7 K/UL (ref 0–1.3)
MONOCYTES RELATIVE PERCENT: 10.8 %
NEUTROPHILS ABSOLUTE: 3.2 K/UL (ref 1.7–7.7)
NEUTROPHILS RELATIVE PERCENT: 51 %
PDW BLD-RTO: 15.3 % (ref 12.4–15.4)
PLATELET # BLD: 198 K/UL (ref 135–450)
PMV BLD AUTO: 8.5 FL (ref 5–10.5)
POTASSIUM SERPL-SCNC: 5.1 MMOL/L (ref 3.5–5.1)
PROSTATE SPECIFIC ANTIGEN: 1.17 NG/ML (ref 0–4)
RBC # BLD: 4.25 M/UL (ref 4.2–5.9)
SODIUM BLD-SCNC: 141 MMOL/L (ref 136–145)
T4 FREE: 1.2 NG/DL (ref 0.9–1.8)
TOTAL CK: 74 U/L (ref 39–308)
TOTAL PROTEIN: 6.5 G/DL (ref 6.4–8.2)
TRIGL SERPL-MCNC: 138 MG/DL (ref 0–150)
TSH SERPL DL<=0.05 MIU/L-ACNC: 1.5 UIU/ML (ref 0.27–4.2)
VLDLC SERPL CALC-MCNC: 28 MG/DL
WBC # BLD: 6.3 K/UL (ref 4–11)

## 2018-07-31 ENCOUNTER — TELEPHONE (OUTPATIENT)
Dept: CARDIOLOGY CLINIC | Age: 78
End: 2018-07-31

## 2018-07-31 LAB
ESTIMATED AVERAGE GLUCOSE: 148.5 MG/DL
HBA1C MFR BLD: 6.8 %

## 2018-08-01 ENCOUNTER — OFFICE VISIT (OUTPATIENT)
Dept: INTERNAL MEDICINE | Age: 78
End: 2018-08-01

## 2018-08-01 VITALS
RESPIRATION RATE: 12 BRPM | DIASTOLIC BLOOD PRESSURE: 76 MMHG | HEIGHT: 66 IN | SYSTOLIC BLOOD PRESSURE: 128 MMHG | HEART RATE: 66 BPM | BODY MASS INDEX: 29.09 KG/M2 | WEIGHT: 181 LBS

## 2018-08-01 DIAGNOSIS — I25.10 ASHD (ARTERIOSCLEROTIC HEART DISEASE): ICD-10-CM

## 2018-08-01 DIAGNOSIS — E03.9 HYPOTHYROIDISM, UNSPECIFIED TYPE: ICD-10-CM

## 2018-08-01 DIAGNOSIS — E78.49 OTHER HYPERLIPIDEMIA: ICD-10-CM

## 2018-08-01 DIAGNOSIS — E11.9 TYPE 2 DIABETES MELLITUS WITHOUT COMPLICATION, WITHOUT LONG-TERM CURRENT USE OF INSULIN (HCC): ICD-10-CM

## 2018-08-01 DIAGNOSIS — Z00.00 MEDICARE ANNUAL WELLNESS VISIT, SUBSEQUENT: Primary | ICD-10-CM

## 2018-08-01 DIAGNOSIS — I10 ESSENTIAL HYPERTENSION: ICD-10-CM

## 2018-08-01 LAB
BILIRUBIN, POC: ABNORMAL
BLOOD URINE, POC: ABNORMAL
CLARITY, POC: CLEAR
COLOR, POC: YELLOW
GLUCOSE URINE, POC: ABNORMAL
KETONES, POC: ABNORMAL
LEUKOCYTE EST, POC: ABNORMAL
NITRITE, POC: ABNORMAL
PH, POC: 5
PROTEIN, POC: ABNORMAL
SPECIFIC GRAVITY, POC: 1.01
UROBILINOGEN, POC: ABNORMAL

## 2018-08-01 PROCEDURE — 81002 URINALYSIS NONAUTO W/O SCOPE: CPT | Performed by: INTERNAL MEDICINE

## 2018-08-01 PROCEDURE — G0439 PPPS, SUBSEQ VISIT: HCPCS | Performed by: INTERNAL MEDICINE

## 2018-08-01 ASSESSMENT — LIFESTYLE VARIABLES
HAS A RELATIVE, FRIEND, DOCTOR, OR ANOTHER HEALTH PROFESSIONAL EXPRESSED CONCERN ABOUT YOUR DRINKING OR SUGGESTED YOU CUT DOWN: 0
AUDIT TOTAL SCORE: 1
HOW OFTEN DURING THE LAST YEAR HAVE YOU BEEN UNABLE TO REMEMBER WHAT HAPPENED THE NIGHT BEFORE BECAUSE YOU HAD BEEN DRINKING: 0
AUDIT-C TOTAL SCORE: 1
HOW OFTEN DO YOU HAVE SIX OR MORE DRINKS ON ONE OCCASION: 0
HOW OFTEN DURING THE LAST YEAR HAVE YOU FAILED TO DO WHAT WAS NORMALLY EXPECTED FROM YOU BECAUSE OF DRINKING: 0
HOW OFTEN DURING THE LAST YEAR HAVE YOU HAD A FEELING OF GUILT OR REMORSE AFTER DRINKING: 0
HOW OFTEN DURING THE LAST YEAR HAVE YOU NEEDED AN ALCOHOLIC DRINK FIRST THING IN THE MORNING TO GET YOURSELF GOING AFTER A NIGHT OF HEAVY DRINKING: 0
HOW OFTEN DO YOU HAVE A DRINK CONTAINING ALCOHOL: 1
HAVE YOU OR SOMEONE ELSE BEEN INJURED AS A RESULT OF YOUR DRINKING: 0
HOW MANY STANDARD DRINKS CONTAINING ALCOHOL DO YOU HAVE ON A TYPICAL DAY: 0
HOW OFTEN DURING THE LAST YEAR HAVE YOU FOUND THAT YOU WERE NOT ABLE TO STOP DRINKING ONCE YOU HAD STARTED: 0

## 2018-08-01 ASSESSMENT — PATIENT HEALTH QUESTIONNAIRE - PHQ9: SUM OF ALL RESPONSES TO PHQ QUESTIONS 1-9: 0

## 2018-08-01 ASSESSMENT — ANXIETY QUESTIONNAIRES: GAD7 TOTAL SCORE: 0

## 2018-08-01 NOTE — PROGRESS NOTES
Saida Median 69 y.o. presents today with   Chief Complaint   Patient presents with    Medicare AWV       Family History   Problem Relation Age of Onset    Other Father 80         ASHD    Other Mother 80         - dementia    Heart Disease Neg Hx     High Blood Pressure Neg Hx     High Cholesterol Neg Hx        Social History     Social History    Marital status:      Spouse name: N/A    Number of children: 4    Years of education: N/A     Occupational History    Not on file.      Social History Main Topics    Smoking status: Never Smoker    Smokeless tobacco: Never Used    Alcohol use Yes      Comment: rarely    Drug use: Unknown    Sexual activity: Not on file     Other Topics Concern    Not on file     Social History Narrative    Living Will:  Yes       Patient Active Problem List   Diagnosis    Type 2 diabetes mellitus ([facility] [address]    Essential hypertension    Hypothyroidism    Other hyperlipidemia    ASHD (arteriosclerotic heart disease)       Past Medical History:   Diagnosis Date    ASHD (arteriosclerotic heart disease)     Basal cell carcinoma 2004 & 2007    resection of multiple , Ca lip    Bell's palsy 2003    Cardiac catheterization as cause of abnormal reaction of patient, or of later complication, without mention of misadventure at time of procedure Dec., 2011    Dr. Maria Elena Sexton - R-30%,Circ-nl,LAD-75%,diag-95%,EF-60%    Diverticulosis     Gastric AVM     GERD (gastroesophageal reflux disease)     HTN (hypertension)     Hyperlipidemia     Hypothyroidism     Melan[facility]) *Aug., 2016    Dr. Myrna Esqueda NIDDM (non-insulin dependent diabetes mellitus)     Spinal stenosis *2017    Severe at C3-C4 with severe cord compression and myelopathy        Past Surgical History:   Procedure Laterality Date   [facility]    normal, LAD:40%, 50%    CARDIAC CATHETERIZATION  Dec., 2011    Dr. Maria Elena Sexton - LAD-75%,duag-95%, No current facility-administered medications for this visit. No Known Allergies    Review of Systems:     Immunization History   Administered Date(s) Administered    Influenza Vaccine, unspecified formulation 12/02/2016    Influenza Virus Vaccine 12/30/2012    Pneumococcal 13-valent Conjugate ([id number]) 02/12/2016    Pneumococcal Polysaccharide ([id number]) 03/15/2005, 02/12/2013, 06/26/2017    Td 05/09/2011    Zoster Live (Zostavax) 02/23/2013     Eye Exam:   June 5, 2018 by Dr. Pamela Francisco   Chest: Denies: cough, hemoptysis, shortness of breath, pleuritic chest pain, wheezing  Cardiovascular: Denies: chest pain, dyspnea on exertion, orthopnea, paroxysmal nocturnal dyspnea, edema, palpitations  Abdomen: no abdominal pain, change in bowel habits, or black or bloody stools  Colonoscopy: December, 2017 by Dr. Kristen Allan revealed diverticulosis. To be repeated prn  Fall Risk low  ADL without assistance   Other: He had a normal stress test 2018     Physical Exam:  General appearance: alert, appears stated age and cooperative  /76 (Site: Left Arm, Position: Sitting, Cuff Size: Medium Adult)   Pulse 66   Resp 12   Ht 5' 6\" (1.676 m)   Wt 181 lb (82.1 kg)   BMI 29.21 kg/m²   Eyes: conjunctivae/corneas clear. PERRL, EOM's intact. Fundi benign. Ears: normal TM's and external ear canals both ears  Nose: Nares normal. Septum midline. Mucosa normal. No drainage or sinus tenderness.   Throat: no abnormalities  Neck: no adenopathy, no carotid bruit, no JVD, supple, symmetrical, trachea midline and thyroid not enlarged, symmetric, no tenderness/mass/nodules  Nodes:no adenopathy palpable  Lungs: clear to auscultation bilaterally  Heart: regular rate and rhythm, S1, S2 normal, no murmur, click, rub or gallop  Abdomen: soft, non-tender; bowel sounds normal; no masses,  no organomegaly  Testicular Mass: No  Extremities: extremities normal, atraumatic, no cyanosis or edema  Neurological: Gait normal. Reflexes normal and symmetric. Sensation grossly normal  Pulse: radial=4/4, femoral=4/4, popliteal=4/4, dorsalis pedis=4/4,   Rectal:No Masses  Prostate:normal for age, non-tender  Skin: normal coloration and turgor, no rashes, no suspicious skin lesions noted  Psych: normal    Lab Review:  Orders Only on 07/30/2018   Component Date Value    WBC 07/30/2018 6.3     RBC 07/30/2018 4.25     Hemoglobin 07/30/2018 13.7     Hematocrit 07/30/2018 40.8     MCV 07/30/2018 96.1     MCH 07/30/2018 32.2     MCHC 07/30/2018 33.5     RDW 07/30/2018 15.3     Platelets 17/23/3441 198     MPV 07/30/2018 8.5     Neutrophils % 07/30/2018 51.0     Lymphocytes % 07/30/2018 31.8     Monocytes % 07/30/2018 10.8     Eosinophils % 07/30/2018 5.1     Basophils % 07/30/2018 1.3     Neutrophils # 07/30/2018 3.2     Lymphocytes # 07/30/2018 2.0     Monocytes # 07/30/2018 0.7     Eosinophils # 07/30/2018 0.3     Basophils # 07/30/2018 0.1     Sodium 07/30/2018 141     Potassium 07/30/2018 5.1     Chloride 07/30/2018 103     CO2 07/30/2018 25     Anion Gap 07/30/2018 13     Glucose 07/30/2018 143*    BUN 07/30/2018 16     CREATININE 07/30/2018 1.0     GFR Non- 07/30/2018 >60     GFR  07/30/2018 >60     Calcium 07/30/2018 9.1     Total Protein 07/30/2018 6.5     Alb 07/30/2018 4.3     Albumin/Globulin Ratio 07/30/2018 2.0     Total Bilirubin 07/30/2018 0.4     Alkaline Phosphatase 07/30/2018 71     ALT 07/30/2018 26     AST 07/30/2018 24     Globulin 07/30/2018 2.2     Cholesterol, Total 07/30/2018 206*    Triglycerides 07/30/2018 138     HDL 07/30/2018 35*    LDL Calculated 07/30/2018 143*    VLDL Cholesterol Calcula* 07/30/2018 28     T4 Free 07/30/2018 1.2     TSH 07/30/2018 1.50     Total CK 07/30/2018 74     Hemoglobin A1C 07/30/2018 6.8     eAG 07/30/2018 148. 5     PSA 07/30/2018 1.17    Orders Only on 07/30/2018   Component Date Value    Magnesium 07/30/2018

## 2018-08-02 LAB
CREATININE URINE: 67.1 MG/DL (ref 39–259)
MICROALBUMIN UR-MCNC: 1.4 MG/DL
MICROALBUMIN/CREAT UR-RTO: 20.9 MG/G (ref 0–30)

## 2019-01-17 RX ORDER — LEVOTHYROXINE SODIUM 88 UG/1
TABLET ORAL
Qty: 90 TABLET | Refills: 3 | Status: SHIPPED | OUTPATIENT
Start: 2019-01-17 | End: 2019-12-30 | Stop reason: SDUPTHER

## 2019-01-17 RX ORDER — ATORVASTATIN CALCIUM 20 MG/1
TABLET, FILM COATED ORAL
Qty: 90 TABLET | Refills: 3 | Status: SHIPPED | OUTPATIENT
Start: 2019-01-17 | End: 2019-12-30 | Stop reason: SDUPTHER

## 2019-01-17 RX ORDER — GLIPIZIDE AND METFORMIN HCL 5; 500 MG/1; MG/1
1 TABLET, FILM COATED ORAL
Qty: 180 TABLET | Refills: 3 | Status: SHIPPED | OUTPATIENT
Start: 2019-01-17 | End: 2020-04-07 | Stop reason: SDUPTHER

## 2019-01-17 RX ORDER — LISINOPRIL 20 MG/1
TABLET ORAL
Qty: 90 TABLET | Refills: 3 | Status: SHIPPED | OUTPATIENT
Start: 2019-01-17 | End: 2019-12-30 | Stop reason: SDUPTHER

## 2019-01-17 RX ORDER — ALLOPURINOL 100 MG/1
TABLET ORAL
Qty: 135 TABLET | Refills: 3 | Status: SHIPPED | OUTPATIENT
Start: 2019-01-17 | End: 2019-12-27 | Stop reason: SDUPTHER

## 2019-01-17 RX ORDER — AMLODIPINE BESYLATE 2.5 MG/1
2.5 TABLET ORAL DAILY
Qty: 90 TABLET | Refills: 3 | Status: SHIPPED | OUTPATIENT
Start: 2019-01-17 | End: 2019-02-01 | Stop reason: CLARIF

## 2019-01-17 RX ORDER — ATENOLOL 50 MG/1
TABLET ORAL
Qty: 45 TABLET | Refills: 3 | Status: SHIPPED
Start: 2019-01-17 | End: 2019-07-16 | Stop reason: CLARIF

## 2019-01-28 ENCOUNTER — TELEPHONE (OUTPATIENT)
Dept: INTERNAL MEDICINE CLINIC | Age: 79
End: 2019-01-28

## 2019-01-28 DIAGNOSIS — E78.49 OTHER HYPERLIPIDEMIA: ICD-10-CM

## 2019-01-28 DIAGNOSIS — E11.9 TYPE 2 DIABETES MELLITUS WITHOUT COMPLICATION, WITHOUT LONG-TERM CURRENT USE OF INSULIN (HCC): Primary | ICD-10-CM

## 2019-01-29 DIAGNOSIS — E78.49 OTHER HYPERLIPIDEMIA: ICD-10-CM

## 2019-01-29 DIAGNOSIS — E11.9 TYPE 2 DIABETES MELLITUS WITHOUT COMPLICATION, WITHOUT LONG-TERM CURRENT USE OF INSULIN (HCC): ICD-10-CM

## 2019-01-29 LAB
ALT SERPL-CCNC: 22 U/L (ref 10–40)
AST SERPL-CCNC: 23 U/L (ref 15–37)
CHOLESTEROL, TOTAL: 134 MG/DL (ref 0–199)
GLUCOSE BLD-MCNC: 156 MG/DL (ref 70–99)
HDLC SERPL-MCNC: 29 MG/DL (ref 40–60)
LDL CHOLESTEROL CALCULATED: 69 MG/DL
TOTAL CK: 90 U/L (ref 39–308)
TRIGL SERPL-MCNC: 181 MG/DL (ref 0–150)
VLDLC SERPL CALC-MCNC: 36 MG/DL

## 2019-01-30 LAB
ESTIMATED AVERAGE GLUCOSE: 139.9 MG/DL
HBA1C MFR BLD: 6.5 %

## 2019-02-01 ENCOUNTER — OFFICE VISIT (OUTPATIENT)
Dept: INTERNAL MEDICINE CLINIC | Age: 79
End: 2019-02-01
Payer: MEDICARE

## 2019-02-01 VITALS
HEIGHT: 66 IN | HEART RATE: 68 BPM | DIASTOLIC BLOOD PRESSURE: 66 MMHG | RESPIRATION RATE: 12 BRPM | WEIGHT: 179 LBS | SYSTOLIC BLOOD PRESSURE: 122 MMHG | BODY MASS INDEX: 28.77 KG/M2

## 2019-02-01 DIAGNOSIS — I25.10 ASHD (ARTERIOSCLEROTIC HEART DISEASE): ICD-10-CM

## 2019-02-01 DIAGNOSIS — I10 ESSENTIAL HYPERTENSION: ICD-10-CM

## 2019-02-01 DIAGNOSIS — E78.49 OTHER HYPERLIPIDEMIA: ICD-10-CM

## 2019-02-01 DIAGNOSIS — E11.9 TYPE 2 DIABETES MELLITUS WITHOUT COMPLICATION, WITHOUT LONG-TERM CURRENT USE OF INSULIN (HCC): Primary | ICD-10-CM

## 2019-02-01 PROCEDURE — 99213 OFFICE O/P EST LOW 20 MIN: CPT | Performed by: INTERNAL MEDICINE

## 2019-02-01 RX ORDER — FAMOTIDINE 10 MG
10 TABLET ORAL
COMMUNITY

## 2019-02-03 PROBLEM — R42 LIGHT-HEADED FEELING: Status: ACTIVE | Noted: 2019-02-03

## 2019-02-04 ENCOUNTER — OFFICE VISIT (OUTPATIENT)
Dept: CARDIOLOGY CLINIC | Age: 79
End: 2019-02-04
Payer: MEDICARE

## 2019-02-04 VITALS
DIASTOLIC BLOOD PRESSURE: 82 MMHG | HEART RATE: 64 BPM | HEIGHT: 66 IN | WEIGHT: 181 LBS | BODY MASS INDEX: 29.09 KG/M2 | SYSTOLIC BLOOD PRESSURE: 136 MMHG | RESPIRATION RATE: 14 BRPM

## 2019-02-04 DIAGNOSIS — R42 LIGHT-HEADED FEELING: ICD-10-CM

## 2019-02-04 DIAGNOSIS — I25.83 CORONARY ARTERY DISEASE DUE TO LIPID RICH PLAQUE: Primary | ICD-10-CM

## 2019-02-04 DIAGNOSIS — I45.10 RBBB: ICD-10-CM

## 2019-02-04 DIAGNOSIS — I10 ESSENTIAL HYPERTENSION: ICD-10-CM

## 2019-02-04 DIAGNOSIS — E78.49 OTHER HYPERLIPIDEMIA: ICD-10-CM

## 2019-02-04 DIAGNOSIS — I25.10 CORONARY ARTERY DISEASE DUE TO LIPID RICH PLAQUE: Primary | ICD-10-CM

## 2019-02-04 PROCEDURE — 99214 OFFICE O/P EST MOD 30 MIN: CPT | Performed by: INTERNAL MEDICINE

## 2019-02-04 PROCEDURE — 93000 ELECTROCARDIOGRAM COMPLETE: CPT | Performed by: INTERNAL MEDICINE

## 2019-02-11 ENCOUNTER — TELEPHONE (OUTPATIENT)
Dept: INTERNAL MEDICINE CLINIC | Age: 79
End: 2019-02-11

## 2019-02-20 ENCOUNTER — TELEPHONE (OUTPATIENT)
Dept: INTERNAL MEDICINE CLINIC | Age: 79
End: 2019-02-20

## 2019-03-22 ENCOUNTER — TELEPHONE (OUTPATIENT)
Dept: INTERNAL MEDICINE CLINIC | Age: 79
End: 2019-03-22

## 2019-06-03 LAB — DIABETIC RETINOPATHY: NEGATIVE

## 2019-07-16 ENCOUNTER — OFFICE VISIT (OUTPATIENT)
Dept: INTERNAL MEDICINE CLINIC | Age: 79
End: 2019-07-16
Payer: MEDICARE

## 2019-07-16 VITALS
DIASTOLIC BLOOD PRESSURE: 66 MMHG | SYSTOLIC BLOOD PRESSURE: 124 MMHG | HEIGHT: 66 IN | RESPIRATION RATE: 12 BRPM | BODY MASS INDEX: 29.21 KG/M2 | HEART RATE: 52 BPM

## 2019-07-16 DIAGNOSIS — Z12.5 SPECIAL SCREENING FOR MALIGNANT NEOPLASM OF PROSTATE: ICD-10-CM

## 2019-07-16 DIAGNOSIS — E78.5 HYPERLIPIDEMIA, UNSPECIFIED HYPERLIPIDEMIA TYPE: ICD-10-CM

## 2019-07-16 DIAGNOSIS — Z13.29 SCREENING FOR THYROID DISORDER: ICD-10-CM

## 2019-07-16 DIAGNOSIS — E11.21 TYPE 2 DIABETES MELLITUS WITH DIABETIC NEPHROPATHY, WITHOUT LONG-TERM CURRENT USE OF INSULIN (HCC): ICD-10-CM

## 2019-07-16 DIAGNOSIS — Z01.818 PRE-OP EXAM: Primary | ICD-10-CM

## 2019-07-16 PROCEDURE — 99213 OFFICE O/P EST LOW 20 MIN: CPT | Performed by: INTERNAL MEDICINE

## 2019-07-16 PROCEDURE — 93000 ELECTROCARDIOGRAM COMPLETE: CPT | Performed by: INTERNAL MEDICINE

## 2019-07-16 RX ORDER — ATENOLOL 25 MG/1
25 TABLET ORAL DAILY
Qty: 90 TABLET | Refills: 3 | Status: SHIPPED | OUTPATIENT
Start: 2019-07-16 | End: 2019-12-27 | Stop reason: SDUPTHER

## 2019-07-16 RX ORDER — ATENOLOL 25 MG/1
25 TABLET ORAL DAILY
COMMUNITY
End: 2019-07-16 | Stop reason: SDUPTHER

## 2019-07-16 ASSESSMENT — PATIENT HEALTH QUESTIONNAIRE - PHQ9
SUM OF ALL RESPONSES TO PHQ QUESTIONS 1-9: 0
2. FEELING DOWN, DEPRESSED OR HOPELESS: 0
1. LITTLE INTEREST OR PLEASURE IN DOING THINGS: 0
SUM OF ALL RESPONSES TO PHQ9 QUESTIONS 1 & 2: 0
SUM OF ALL RESPONSES TO PHQ QUESTIONS 1-9: 0

## 2019-07-25 PROBLEM — I45.10 RBBB: Status: RESOLVED | Noted: 2019-02-04 | Resolved: 2019-07-25

## 2019-07-25 PROBLEM — R42 LIGHT-HEADED FEELING: Status: RESOLVED | Noted: 2019-02-03 | Resolved: 2019-07-25

## 2019-07-29 DIAGNOSIS — Z13.29 SCREENING FOR THYROID DISORDER: ICD-10-CM

## 2019-07-29 DIAGNOSIS — E78.5 HYPERLIPIDEMIA, UNSPECIFIED HYPERLIPIDEMIA TYPE: ICD-10-CM

## 2019-07-29 DIAGNOSIS — Z01.818 PRE-OP EXAM: ICD-10-CM

## 2019-07-29 DIAGNOSIS — E11.21 TYPE 2 DIABETES MELLITUS WITH DIABETIC NEPHROPATHY, WITHOUT LONG-TERM CURRENT USE OF INSULIN (HCC): ICD-10-CM

## 2019-07-29 DIAGNOSIS — Z12.5 SPECIAL SCREENING FOR MALIGNANT NEOPLASM OF PROSTATE: ICD-10-CM

## 2019-07-29 LAB
A/G RATIO: 2.1 (ref 1.1–2.2)
ALBUMIN SERPL-MCNC: 4.7 G/DL (ref 3.4–5)
ALP BLD-CCNC: 73 U/L (ref 40–129)
ALT SERPL-CCNC: 21 U/L (ref 10–40)
ANION GAP SERPL CALCULATED.3IONS-SCNC: 14 MMOL/L (ref 3–16)
AST SERPL-CCNC: 22 U/L (ref 15–37)
BASOPHILS ABSOLUTE: 0.2 K/UL (ref 0–0.2)
BASOPHILS RELATIVE PERCENT: 1.8 %
BILIRUB SERPL-MCNC: 0.7 MG/DL (ref 0–1)
BUN BLDV-MCNC: 23 MG/DL (ref 7–20)
CALCIUM SERPL-MCNC: 10 MG/DL (ref 8.3–10.6)
CHLORIDE BLD-SCNC: 102 MMOL/L (ref 99–110)
CHOLESTEROL, TOTAL: 142 MG/DL (ref 0–199)
CO2: 22 MMOL/L (ref 21–32)
CREAT SERPL-MCNC: 1.1 MG/DL (ref 0.8–1.3)
EOSINOPHILS ABSOLUTE: 0.4 K/UL (ref 0–0.6)
EOSINOPHILS RELATIVE PERCENT: 5.1 %
GFR AFRICAN AMERICAN: >60
GFR NON-AFRICAN AMERICAN: >60
GLOBULIN: 2.2 G/DL
GLUCOSE BLD-MCNC: 181 MG/DL (ref 70–99)
HCT VFR BLD CALC: 40.5 % (ref 40.5–52.5)
HDLC SERPL-MCNC: 30 MG/DL (ref 40–60)
HEMOGLOBIN: 13.5 G/DL (ref 13.5–17.5)
LDL CHOLESTEROL CALCULATED: 80 MG/DL
LYMPHOCYTES ABSOLUTE: 3 K/UL (ref 1–5.1)
LYMPHOCYTES RELATIVE PERCENT: 34.1 %
MCH RBC QN AUTO: 32 PG (ref 26–34)
MCHC RBC AUTO-ENTMCNC: 33.4 G/DL (ref 31–36)
MCV RBC AUTO: 95.7 FL (ref 80–100)
MONOCYTES ABSOLUTE: 0.9 K/UL (ref 0–1.3)
MONOCYTES RELATIVE PERCENT: 10.1 %
NEUTROPHILS ABSOLUTE: 4.3 K/UL (ref 1.7–7.7)
NEUTROPHILS RELATIVE PERCENT: 48.9 %
PDW BLD-RTO: 15.8 % (ref 12.4–15.4)
PLATELET # BLD: 201 K/UL (ref 135–450)
PMV BLD AUTO: 8.5 FL (ref 5–10.5)
POTASSIUM SERPL-SCNC: 4.6 MMOL/L (ref 3.5–5.1)
PROSTATE SPECIFIC ANTIGEN: 0.77 NG/ML (ref 0–4)
RBC # BLD: 4.23 M/UL (ref 4.2–5.9)
SODIUM BLD-SCNC: 138 MMOL/L (ref 136–145)
TOTAL CK: 105 U/L (ref 39–308)
TOTAL PROTEIN: 6.9 G/DL (ref 6.4–8.2)
TRIGL SERPL-MCNC: 161 MG/DL (ref 0–150)
TSH SERPL DL<=0.05 MIU/L-ACNC: 1.41 UIU/ML (ref 0.27–4.2)
VLDLC SERPL CALC-MCNC: 32 MG/DL
WBC # BLD: 8.7 K/UL (ref 4–11)

## 2019-07-30 LAB
ESTIMATED AVERAGE GLUCOSE: 145.6 MG/DL
HBA1C MFR BLD: 6.7 %

## 2019-08-02 ENCOUNTER — OFFICE VISIT (OUTPATIENT)
Dept: INTERNAL MEDICINE CLINIC | Age: 79
End: 2019-08-02
Payer: MEDICARE

## 2019-08-02 VITALS
SYSTOLIC BLOOD PRESSURE: 122 MMHG | HEIGHT: 66 IN | DIASTOLIC BLOOD PRESSURE: 74 MMHG | BODY MASS INDEX: 28.61 KG/M2 | HEART RATE: 66 BPM | RESPIRATION RATE: 12 BRPM | WEIGHT: 178 LBS

## 2019-08-02 DIAGNOSIS — I10 ESSENTIAL HYPERTENSION: ICD-10-CM

## 2019-08-02 DIAGNOSIS — E78.5 HYPERLIPIDEMIA, UNSPECIFIED HYPERLIPIDEMIA TYPE: ICD-10-CM

## 2019-08-02 DIAGNOSIS — Z12.11 SPECIAL SCREENING FOR MALIGNANT NEOPLASMS, COLON: ICD-10-CM

## 2019-08-02 DIAGNOSIS — E11.21 TYPE 2 DIABETES MELLITUS WITH DIABETIC NEPHROPATHY, WITHOUT LONG-TERM CURRENT USE OF INSULIN (HCC): ICD-10-CM

## 2019-08-02 DIAGNOSIS — Z00.00 MEDICARE ANNUAL WELLNESS VISIT, SUBSEQUENT: Primary | ICD-10-CM

## 2019-08-02 LAB
BILIRUBIN, POC: NORMAL
BLOOD URINE, POC: NORMAL
CLARITY, POC: CLEAR
COLOR, POC: YELLOW
CREATININE URINE: 29.4 MG/DL (ref 39–259)
GLUCOSE URINE, POC: NORMAL
KETONES, POC: NORMAL
LEUKOCYTE EST, POC: NORMAL
MICROALBUMIN UR-MCNC: <1.2 MG/DL
MICROALBUMIN/CREAT UR-RTO: ABNORMAL MG/G (ref 0–30)
NITRITE, POC: NORMAL
PH, POC: 5
PROTEIN, POC: NORMAL
SPECIFIC GRAVITY, POC: 1.01
UROBILINOGEN, POC: NORMAL

## 2019-08-02 PROCEDURE — G0439 PPPS, SUBSEQ VISIT: HCPCS | Performed by: INTERNAL MEDICINE

## 2019-08-02 PROCEDURE — 81002 URINALYSIS NONAUTO W/O SCOPE: CPT | Performed by: INTERNAL MEDICINE

## 2019-08-02 ASSESSMENT — LIFESTYLE VARIABLES: HOW OFTEN DO YOU HAVE A DRINK CONTAINING ALCOHOL: 0

## 2019-08-02 ASSESSMENT — PATIENT HEALTH QUESTIONNAIRE - PHQ9
SUM OF ALL RESPONSES TO PHQ QUESTIONS 1-9: 0
SUM OF ALL RESPONSES TO PHQ QUESTIONS 1-9: 0

## 2019-08-02 NOTE — PROGRESS NOTES
 CO2 07/29/2019 22     Anion Gap 07/29/2019 14     Glucose 07/29/2019 181*    BUN 07/29/2019 23*    CREATININE 07/29/2019 1.1     GFR Non- 07/29/2019 >60     GFR  07/29/2019 >60     Calcium 07/29/2019 10.0     Total Protein 07/29/2019 6.9     Alb 07/29/2019 4.7     Albumin/Globulin Ratio 07/29/2019 2.1     Total Bilirubin 07/29/2019 0.7     Alkaline Phosphatase 07/29/2019 73     ALT 07/29/2019 21     AST 07/29/2019 22     Globulin 07/29/2019 2.2     WBC 07/29/2019 8.7     RBC 07/29/2019 4.23     Hemoglobin 07/29/2019 13.5     Hematocrit 07/29/2019 40.5     MCV 07/29/2019 95.7     MCH 07/29/2019 32.0     MCHC 07/29/2019 33.4     RDW 07/29/2019 15.8*    Platelets 12/05/7183 201     MPV 07/29/2019 8.5     Neutrophils % 07/29/2019 48.9     Lymphocytes % 07/29/2019 34.1     Monocytes % 07/29/2019 10.1     Eosinophils % 07/29/2019 5.1     Basophils % 07/29/2019 1.8     Neutrophils # 07/29/2019 4.3     Lymphocytes # 07/29/2019 3.0     Monocytes # 07/29/2019 0.9     Eosinophils # 07/29/2019 0.4     Basophils # 07/29/2019 0.2    Abstract on 06/06/2019   Component Date Value    Diabetic Retinopathy 06/03/2019 Negative    Orders Only on 06/04/2019   Component Date Value    OD Visual Acuity Distance 06/04/2019 20/30     Visual Acuity Distance E* 06/04/2019 20/30     Intraocular Pressure Eye 06/04/2019                      Value:18  23      Diabetic Retinopathy 06/04/2019 NEGATIVE     Cataracts 06/04/2019 POSITIVE     Glaucoma 06/04/2019 NEGATIVE        Assessment: Stable    Plan:              UA and microalbumin and hemoccults  Hypertension - well controlled, continue medicine  Hyperlipidemia - labs good, continue medicine   Hypothyroidism - labs good, continue monitor  Type 2 diabetes - labs good, continue medicine   ASHD - stable, continue medicines managed by Dr. Anil Orr cardiology  Advised to make a dental appointment  Hearing - ongoing, continue

## 2019-08-09 DIAGNOSIS — Z12.11 SPECIAL SCREENING FOR MALIGNANT NEOPLASMS, COLON: ICD-10-CM

## 2019-08-09 LAB
CONTROL: NORMAL
HEMOCCULT STL QL: NEGATIVE

## 2019-08-09 PROCEDURE — 82274 ASSAY TEST FOR BLOOD FECAL: CPT | Performed by: INTERNAL MEDICINE

## 2019-11-25 ENCOUNTER — TELEPHONE (OUTPATIENT)
Dept: INTERNAL MEDICINE CLINIC | Age: 79
End: 2019-11-25

## 2019-11-25 RX ORDER — BUSPIRONE HYDROCHLORIDE 10 MG/1
10 TABLET ORAL 3 TIMES DAILY
COMMUNITY
End: 2020-02-03

## 2019-12-13 ENCOUNTER — TELEPHONE (OUTPATIENT)
Dept: INTERNAL MEDICINE CLINIC | Age: 79
End: 2019-12-13

## 2019-12-30 RX ORDER — ATORVASTATIN CALCIUM 20 MG/1
TABLET, FILM COATED ORAL
Qty: 90 TABLET | Refills: 3 | Status: SHIPPED | OUTPATIENT
Start: 2019-12-30 | End: 2021-02-26 | Stop reason: SDUPTHER

## 2019-12-30 RX ORDER — ALLOPURINOL 100 MG/1
TABLET ORAL
Qty: 135 TABLET | Refills: 3 | Status: SHIPPED | OUTPATIENT
Start: 2019-12-30 | End: 2021-02-25 | Stop reason: SDUPTHER

## 2019-12-30 RX ORDER — LEVOTHYROXINE SODIUM 88 UG/1
TABLET ORAL
Qty: 90 TABLET | Refills: 3 | Status: SHIPPED | OUTPATIENT
Start: 2019-12-30 | End: 2021-02-26 | Stop reason: SDUPTHER

## 2019-12-30 RX ORDER — LISINOPRIL 20 MG/1
TABLET ORAL
Qty: 90 TABLET | Refills: 3 | Status: SHIPPED | OUTPATIENT
Start: 2019-12-30 | End: 2021-03-12

## 2019-12-30 RX ORDER — ATENOLOL 25 MG/1
12.5 TABLET ORAL DAILY
Qty: 90 TABLET | Refills: 3 | Status: SHIPPED | OUTPATIENT
Start: 2019-12-30 | End: 2021-03-12

## 2020-01-30 ENCOUNTER — TELEPHONE (OUTPATIENT)
Dept: INTERNAL MEDICINE CLINIC | Age: 80
End: 2020-01-30

## 2020-01-31 DIAGNOSIS — E78.5 HYPERLIPIDEMIA, UNSPECIFIED HYPERLIPIDEMIA TYPE: ICD-10-CM

## 2020-01-31 DIAGNOSIS — E11.21 TYPE 2 DIABETES MELLITUS WITH DIABETIC NEPHROPATHY, WITHOUT LONG-TERM CURRENT USE OF INSULIN (HCC): ICD-10-CM

## 2020-01-31 LAB
ALT SERPL-CCNC: 15 U/L (ref 10–40)
AST SERPL-CCNC: 22 U/L (ref 15–37)
CHOLESTEROL, TOTAL: 129 MG/DL (ref 0–199)
GLUCOSE BLD-MCNC: 144 MG/DL (ref 70–99)
HDLC SERPL-MCNC: 27 MG/DL (ref 40–60)
LDL CHOLESTEROL CALCULATED: 74 MG/DL
TOTAL CK: 88 U/L (ref 39–308)
TRIGL SERPL-MCNC: 139 MG/DL (ref 0–150)
VLDLC SERPL CALC-MCNC: 28 MG/DL

## 2020-02-01 LAB
ESTIMATED AVERAGE GLUCOSE: 142.7 MG/DL
HBA1C MFR BLD: 6.6 %

## 2020-02-03 ENCOUNTER — OFFICE VISIT (OUTPATIENT)
Dept: INTERNAL MEDICINE CLINIC | Age: 80
End: 2020-02-03
Payer: MEDICARE

## 2020-02-03 VITALS
SYSTOLIC BLOOD PRESSURE: 112 MMHG | BODY MASS INDEX: 28.54 KG/M2 | RESPIRATION RATE: 12 BRPM | WEIGHT: 177.6 LBS | HEIGHT: 66 IN | DIASTOLIC BLOOD PRESSURE: 74 MMHG | HEART RATE: 66 BPM

## 2020-02-03 PROCEDURE — 99213 OFFICE O/P EST LOW 20 MIN: CPT | Performed by: INTERNAL MEDICINE

## 2020-02-03 ASSESSMENT — PATIENT HEALTH QUESTIONNAIRE - PHQ9
1. LITTLE INTEREST OR PLEASURE IN DOING THINGS: 0
SUM OF ALL RESPONSES TO PHQ QUESTIONS 1-9: 0
SUM OF ALL RESPONSES TO PHQ9 QUESTIONS 1 & 2: 0
2. FEELING DOWN, DEPRESSED OR HOPELESS: 0
SUM OF ALL RESPONSES TO PHQ QUESTIONS 1-9: 0

## 2020-02-03 NOTE — PROGRESS NOTES
Chief Complaint   Patient presents with    Diabetes     6 mo    Hypertension        HPI:  Patient comes in for follow up of hypertension which is long standing, moderate, maintained on atenolol and lisinopril. Patient is tolerating all the medicines without complications or side effects. Denies headache, visual disturbances, dizziness, or palpitations. He also has Type 2 diabetes for which he takes glipizide/metformin.   He follows a reasonable diet and exercises      Social History     Socioeconomic History    Marital status:      Spouse name: Not on file    Number of children: 3    Years of education: Not on file    Highest education level: Not on file   Occupational History    Not on file   Social Needs    Financial resource strain: Not on file    Food insecurity:     Worry: Not on file     Inability: Not on file    Transportation needs:     Medical: Not on file     Non-medical: Not on file   Tobacco Use    Smoking status: Never Smoker    Smokeless tobacco: Never Used   Substance and Sexual Activity    Alcohol use: Yes     Comment: rarely    Drug use: Not on file    Sexual activity: Not on file   Lifestyle    Physical activity:     Days per week: Not on file     Minutes per session: Not on file    Stress: Not on file   Relationships    Social connections:     Talks on phone: Not on file     Gets together: Not on file     Attends Hoahaoism service: Not on file     Active member of club or organization: Not on file     Attends meetings of clubs or organizations: Not on file     Relationship status: Not on file    Intimate partner violence:     Fear of current or ex partner: Not on file     Emotionally abused: Not on file     Physically abused: Not on file     Forced sexual activity: Not on file   Other Topics Concern    Not on file   Social History Narrative    Living Will:  Yes     Patient Active Problem List   Diagnosis    Type 2 diabetes mellitus ([facility] [address]    Essential hypertension    more thorough exam     Kindra Negro MD

## 2020-02-03 NOTE — PATIENT INSTRUCTIONS
BP Readings from Last 2 Encounters:   08/02/19 122/74   07/16/19 124/66     Hemoglobin A1C (%)   Date Value   01/31/2020 6.6     Microscopic Examination (no units)   Date Value   10/23/2017 Not Indicated     Microalbumin, Random Urine (mg/dL)   Date Value   08/02/2019 <1.20     LDL Calculated (mg/dL)   Date Value   01/31/2020 74              Tobacco use:  Patient  reports that he has never smoked. He has never used smokeless tobacco.    If Smoker - Cessation materials given? NA    Is Daily aspirin on medication list?:  Yes    Diabetic retinal exam done? Yes   If yes, document in Health Maintenance. Monofilament placed on counter? Yes    Shoes and socks removed? Yes    BP taken with correct size cuff? Yes   Repeated if > 130/80 Yes     Microalbumin performed if applicable?   NA     AWV after August 2

## 2020-04-07 RX ORDER — GLIPIZIDE AND METFORMIN HCL 5; 500 MG/1; MG/1
1 TABLET, FILM COATED ORAL
Qty: 180 TABLET | Refills: 3 | Status: SHIPPED | OUTPATIENT
Start: 2020-04-07 | End: 2021-02-26 | Stop reason: SDUPTHER

## 2020-06-08 LAB — DIABETIC RETINOPATHY: NEGATIVE

## 2020-08-05 DIAGNOSIS — E11.9 TYPE 2 DIABETES MELLITUS WITHOUT COMPLICATION, WITHOUT LONG-TERM CURRENT USE OF INSULIN (HCC): ICD-10-CM

## 2020-08-05 DIAGNOSIS — Z12.5 SPECIAL SCREENING FOR MALIGNANT NEOPLASM OF PROSTATE: ICD-10-CM

## 2020-08-05 DIAGNOSIS — E03.9 HYPOTHYROIDISM, UNSPECIFIED TYPE: ICD-10-CM

## 2020-08-05 DIAGNOSIS — I10 ESSENTIAL HYPERTENSION: ICD-10-CM

## 2020-08-05 DIAGNOSIS — E78.49 OTHER HYPERLIPIDEMIA: ICD-10-CM

## 2020-08-05 LAB
A/G RATIO: 2 (ref 1.1–2.2)
ALBUMIN SERPL-MCNC: 4.3 G/DL (ref 3.4–5)
ALP BLD-CCNC: 59 U/L (ref 40–129)
ALT SERPL-CCNC: 22 U/L (ref 10–40)
ANION GAP SERPL CALCULATED.3IONS-SCNC: 14 MMOL/L (ref 3–16)
AST SERPL-CCNC: 24 U/L (ref 15–37)
BASOPHILS ABSOLUTE: 0.1 K/UL (ref 0–0.2)
BASOPHILS RELATIVE PERCENT: 0.9 %
BILIRUB SERPL-MCNC: 0.7 MG/DL (ref 0–1)
BILIRUBIN URINE: NEGATIVE
BLOOD, URINE: NEGATIVE
BUN BLDV-MCNC: 19 MG/DL (ref 7–20)
CALCIUM SERPL-MCNC: 9 MG/DL (ref 8.3–10.6)
CHLORIDE BLD-SCNC: 101 MMOL/L (ref 99–110)
CHOLESTEROL, TOTAL: 143 MG/DL (ref 0–199)
CLARITY: CLEAR
CO2: 24 MMOL/L (ref 21–32)
COLOR: YELLOW
CREAT SERPL-MCNC: 0.9 MG/DL (ref 0.8–1.3)
CREATININE URINE: 121 MG/DL (ref 39–259)
EOSINOPHILS ABSOLUTE: 0.5 K/UL (ref 0–0.6)
EOSINOPHILS RELATIVE PERCENT: 5.7 %
GFR AFRICAN AMERICAN: >60
GFR NON-AFRICAN AMERICAN: >60
GLOBULIN: 2.2 G/DL
GLUCOSE BLD-MCNC: 127 MG/DL (ref 70–99)
GLUCOSE URINE: NEGATIVE MG/DL
HCT VFR BLD CALC: 40.2 % (ref 40.5–52.5)
HDLC SERPL-MCNC: 30 MG/DL (ref 40–60)
HEMOGLOBIN: 13.4 G/DL (ref 13.5–17.5)
KETONES, URINE: NEGATIVE MG/DL
LDL CHOLESTEROL CALCULATED: 77 MG/DL
LEUKOCYTE ESTERASE, URINE: NEGATIVE
LYMPHOCYTES ABSOLUTE: 2.7 K/UL (ref 1–5.1)
LYMPHOCYTES RELATIVE PERCENT: 31.5 %
MCH RBC QN AUTO: 31.6 PG (ref 26–34)
MCHC RBC AUTO-ENTMCNC: 33.4 G/DL (ref 31–36)
MCV RBC AUTO: 94.8 FL (ref 80–100)
MICROALBUMIN UR-MCNC: <1.2 MG/DL
MICROALBUMIN/CREAT UR-RTO: NORMAL MG/G (ref 0–30)
MICROSCOPIC EXAMINATION: NORMAL
MONOCYTES ABSOLUTE: 1 K/UL (ref 0–1.3)
MONOCYTES RELATIVE PERCENT: 11.2 %
NEUTROPHILS ABSOLUTE: 4.4 K/UL (ref 1.7–7.7)
NEUTROPHILS RELATIVE PERCENT: 50.7 %
NITRITE, URINE: NEGATIVE
PDW BLD-RTO: 16.5 % (ref 12.4–15.4)
PH UA: 6 (ref 5–8)
PLATELET # BLD: 214 K/UL (ref 135–450)
PMV BLD AUTO: 8.5 FL (ref 5–10.5)
POTASSIUM SERPL-SCNC: 4.4 MMOL/L (ref 3.5–5.1)
PROSTATE SPECIFIC ANTIGEN: 0.77 NG/ML (ref 0–4)
PROTEIN UA: NEGATIVE MG/DL
RBC # BLD: 4.24 M/UL (ref 4.2–5.9)
SODIUM BLD-SCNC: 139 MMOL/L (ref 136–145)
SPECIFIC GRAVITY UA: 1.02 (ref 1–1.03)
TOTAL CK: 81 U/L (ref 39–308)
TOTAL PROTEIN: 6.5 G/DL (ref 6.4–8.2)
TRIGL SERPL-MCNC: 180 MG/DL (ref 0–150)
TSH SERPL DL<=0.05 MIU/L-ACNC: 1.03 UIU/ML (ref 0.27–4.2)
URINE TYPE: NORMAL
UROBILINOGEN, URINE: 0.2 E.U./DL
VLDLC SERPL CALC-MCNC: 36 MG/DL
WBC # BLD: 8.7 K/UL (ref 4–11)

## 2020-08-06 LAB
ESTIMATED AVERAGE GLUCOSE: 137 MG/DL
HBA1C MFR BLD: 6.4 %

## 2020-08-10 ENCOUNTER — OFFICE VISIT (OUTPATIENT)
Dept: INTERNAL MEDICINE CLINIC | Age: 80
End: 2020-08-10
Payer: MEDICARE

## 2020-08-10 VITALS
HEIGHT: 66 IN | RESPIRATION RATE: 12 BRPM | SYSTOLIC BLOOD PRESSURE: 118 MMHG | WEIGHT: 177.2 LBS | HEART RATE: 54 BPM | BODY MASS INDEX: 28.48 KG/M2 | DIASTOLIC BLOOD PRESSURE: 66 MMHG

## 2020-08-10 PROCEDURE — 93000 ELECTROCARDIOGRAM COMPLETE: CPT | Performed by: INTERNAL MEDICINE

## 2020-08-10 PROCEDURE — G0439 PPPS, SUBSEQ VISIT: HCPCS | Performed by: INTERNAL MEDICINE

## 2020-08-10 ASSESSMENT — LIFESTYLE VARIABLES
HOW OFTEN DURING THE LAST YEAR HAVE YOU BEEN UNABLE TO REMEMBER WHAT HAPPENED THE NIGHT BEFORE BECAUSE YOU HAD BEEN DRINKING: 0
HAVE YOU OR SOMEONE ELSE BEEN INJURED AS A RESULT OF YOUR DRINKING: 0
HOW OFTEN DURING THE LAST YEAR HAVE YOU FAILED TO DO WHAT WAS NORMALLY EXPECTED FROM YOU BECAUSE OF DRINKING: 0
HOW OFTEN DURING THE LAST YEAR HAVE YOU HAD A FEELING OF GUILT OR REMORSE AFTER DRINKING: 0
HOW OFTEN DURING THE LAST YEAR HAVE YOU NEEDED AN ALCOHOLIC DRINK FIRST THING IN THE MORNING TO GET YOURSELF GOING AFTER A NIGHT OF HEAVY DRINKING: 0
HOW OFTEN DO YOU HAVE SIX OR MORE DRINKS ON ONE OCCASION: 0
AUDIT TOTAL SCORE: 1
HAS A RELATIVE, FRIEND, DOCTOR, OR ANOTHER HEALTH PROFESSIONAL EXPRESSED CONCERN ABOUT YOUR DRINKING OR SUGGESTED YOU CUT DOWN: 0
HOW MANY STANDARD DRINKS CONTAINING ALCOHOL DO YOU HAVE ON A TYPICAL DAY: 0
HOW OFTEN DO YOU HAVE A DRINK CONTAINING ALCOHOL: 1
AUDIT-C TOTAL SCORE: 1
HOW OFTEN DURING THE LAST YEAR HAVE YOU FOUND THAT YOU WERE NOT ABLE TO STOP DRINKING ONCE YOU HAD STARTED: 0

## 2020-08-10 ASSESSMENT — PATIENT HEALTH QUESTIONNAIRE - PHQ9
SUM OF ALL RESPONSES TO PHQ QUESTIONS 1-9: 0
SUM OF ALL RESPONSES TO PHQ QUESTIONS 1-9: 0

## 2020-08-10 NOTE — PATIENT INSTRUCTIONS
BP Readings from Last 2 Encounters:   02/03/20 112/74   08/02/19 122/74     Hemoglobin A1C (%)   Date Value   08/05/2020 6.4     Microscopic Examination (no units)   Date Value   08/05/2020 Not Indicated     Microalbumin, Random Urine (mg/dL)   Date Value   08/05/2020 <1.20     LDL Calculated (mg/dL)   Date Value   08/05/2020 77              Tobacco use:  Patient  reports that he has never smoked. He has never used smokeless tobacco.    If Smoker - Cessation materials given? NA    Is Daily aspirin on medication list?:  No -  Patient stopped    Diabetic retinal exam done? Yes   If yes, document in Health Maintenance. Monofilament placed on counter? Yes    Shoes and socks removed? Yes    BP taken with correct size cuff? Yes   Repeated if > 130/80 Yes     Microalbumin performed if applicable?   Yes

## 2020-08-10 NOTE — PROGRESS NOTES
Berenice Leola 67 y.o. presents today with   Chief Complaint   Patient presents with    Medicare AWV       Family History   Problem Relation Age of Onset    Other Father 80         ASHD   Juliane Hale Other Mother 80         - dementia    Heart Disease Neg Hx     High Blood Pressure Neg Hx     High Cholesterol Neg Hx        Social History     Socioeconomic History    Marital status:      Spouse name: Not on file    Number of children: 3    Years of education: Not on file    Highest education level: Not on file   Occupational History    Not on file   Social Needs    Financial resource strain: Not on file    Food insecurity     Worry: Not on file     Inability: Not on file   [employer] needs     Medical: Not on file     Non-medical: Not on file   Tobacco Use    Smoking status: Never Smoker    Smokeless tobacco: Never Used   Substance and Sexual Activity    Alcohol use: Yes     Comment: rarely    Drug use: Not on file    Sexual activity: Not on file   Lifestyle    Physical activity     Days per week: Not on file     Minutes per session: Not on file    Stress: Not on file   Relationships    Social connections     Talks on phone: Not on file     Gets together: Not on file     Attends Zoroastrian service: Not on file     Active member of club or organization: Not on file     Attends meetings of clubs or organizations: Not on file     Relationship status: Not on file    Intimate partner violence     Fear of current or ex partner: Not on file     Emotionally abused: Not on file     Physically abused: Not on file     Forced sexual activity: Not on file   Other Topics Concern    Not on file   Social History Narrative    Living Will:  Yes       Patient Active Problem List   Diagnosis    Type 2 diabetes mellitus ([facility]    Essential hypertension    Hypothyroidism    Other hyperlipidemia    ASHD (arteriosclerotic heart disease)       Past Medical History:   Diagnosis Date    ASHD (arteriosclerotic heart disease)     Basal cell carcinoma Jan., 2004 & Feb., 2007    resection of multiple , Ca lip    Bell's palsy Nov., 2003    Cardiac catheterization as cause of abnormal reaction of patient, or of later complication, without mention of misadventure at time of procedure Dec., 2011    Dr. Jennifer Doll - R-30%,Circ-nl,LAD-75%,diag-95%,EF-60%    Diverticulosis     Gastric AVM     GERD (gastroesophageal reflux disease)     HTN (hypertension)     Hyperlipidemia     Hypothyroidism 2003    Melanoma [facility]) *Aug., 2016    Dr. Calloway Chol NIDDM (non-insulin dependent diabetes mellitus) 2005    Spinal stenosis *Febr., 2017    Severe at C3-C4 with severe cord compression and myelopathy        Past Surgical History:   Procedure Laterality Date   [address]    normal, LAD:40%, 50%    CARDIAC CATHETERIZATION  Dec., 2011    Dr. Jennifer Doll - LAD-75%,duag-95%, R-Normal,Circ-normal    CERVICAL [facility] SURGERY  *May, 2017    Dr. Alfreda Luna - ACDF - C4-C5    COLONOSCOPY  Sept., 2007 ( 2012 )     Dr. Mandy Cornell - diverticulosis, hemorrhoids    COLONOSCOPY  Dec. 18, 2012 ( 2017 )     Dr. Aravind Chang - sigmoid diverticulosis    COLONOSCOPY  *Dec.5, 2017 ( prn )    Dr. Nazario Reasons - diverticulosis   Princella Beam  March, 2015    Dr. Liriano Shoulder - rubber banding    HERNIA REPAIR      Right inguinal     INTRACAPSULAR CATARACT EXTRACTION  *July, 2019    Dr. Bart Gonzalez - bilateral    UPPER GASTROINTESTINAL ENDOSCOPY  April, 2003    AV malformation, Booth's       Current Outpatient Medications   Medication Sig Dispense Refill    blood glucose test strips (ONE TOUCH ULTRA TEST) strip USE AS DIRECTED TWICE DAILY 200 strip 3    glipiZIDE-metformin (METAGLIP) 5-500 MG per tablet Take 1 tablet by mouth 2 times daily (before meals) 180 tablet 3    atorvastatin (LIPITOR) 20 MG tablet TAKE ONE TABLET BY MOUTH ONCE DAILY 90 tablet 3    allopurinol (ZYLOPRIM) 100 MG tablet TAKE ONE & ONE-HALF TABLETS BY MOUTH ONCE DAILY 135 tablet 3    levothyroxine (SYNTHROID) 88 MCG tablet TAKE ONE TABLET BY MOUTH ONCE DAILY 90 tablet 3    lisinopril (PRINIVIL;ZESTRIL) 20 MG tablet TAKE ONE TABLET BY MOUTH ONCE DAILY 90 tablet 3    atenolol (TENORMIN) 25 MG tablet Take 0.5 tablets by mouth daily 90 tablet 3    ONE TOUCH LANCETS MISC 1 each by Does not apply route 2 times daily 200 each 3    famotidine (PEPCID) 10 MG tablet Take 10 mg by mouth nightly as needed       Multiple Vitamins-Minerals (CENTRUM SILVER PO) Take  by mouth. No current facility-administered medications for this visit. No Known Allergies    Review of Systems:     Immunization History   Administered Date(s) Administered    Influenza Vaccine, unspecified formulation 12/02/2016    Influenza Virus Vaccine 12/30/2012, 11/18/2014    Influenza, High Dose (Fluzone 65 yrs and older) 11/17/2017, 10/01/2018, 11/29/2019    Pneumococcal Conjugate 13-valent ([id number]) 02/12/2016    Pneumococcal Polysaccharide ([id number]) 03/15/2005, 02/12/2013, 06/26/2017    Td, unspecified formulation 05/09/2011    Zoster Live (Zostavax) 02/23/2013    Zoster Recombinant (Shingrix) 09/25/2018, 01/01/2019     Eye Exam: June 8, 2020 by Dr. Kaushik Franks at [facility]   Chest: Denies: cough, hemoptysis, shortness of breath, pleuritic chest pain, wheezing  Cardiovascular: Denies: chest pain, dyspnea on exertion, orthopnea, paroxysmal nocturnal dyspnea, edema, palpitations  Abdomen: no abdominal pain, change in bowel habits, or black or bloody stools  Colonoscopy: December, 2017 by Dr. Galo Hernandez  Revealed diverticulosis. To be repeated prn  Fall Risk low  ADL   Without assistance  Other: N/A    Physical Exam:  General appearance: alert, appears stated age and cooperative  /67 (Site: Left Upper Arm, Position: Sitting, Cuff Size: Medium Adult)   Pulse 54   Resp 12   Ht 5' 6\" (1.676 m)   Wt 177 lb 3.2 oz (80.4 kg)   BMI 28.60 kg/m²   Eyes: conjunctivae/corneas clear.  PERRL, EOM's 08/05/2020 4.24     Hemoglobin 08/05/2020 13.4*    Hematocrit 08/05/2020 40.2*    MCV 08/05/2020 94.8     MCH 08/05/2020 31.6     MCHC 08/05/2020 33.4     RDW 08/05/2020 16.5*    Platelets 78/18/2657 214     MPV 08/05/2020 8.5     Neutrophils % 08/05/2020 50.7     Lymphocytes % 08/05/2020 31.5     Monocytes % 08/05/2020 11.2     Eosinophils % 08/05/2020 5.7     Basophils % 08/05/2020 0.9     Neutrophils Absolute 08/05/2020 4.4     Lymphocytes Absolute 08/05/2020 2.7     Monocytes Absolute 08/05/2020 1.0     Eosinophils Absolute 08/05/2020 0.5     Basophils Absolute 08/05/2020 0.1     Microalbumin, Random Uri* 08/05/2020 <1.20     Creatinine, Ur 08/05/2020 121.0     Microalbumin Creatinine * 08/05/2020 see below     Color, UA 08/05/2020 YELLOW     Clarity, UA 08/05/2020 Clear     Glucose, Ur 08/05/2020 Negative     Bilirubin Urine 08/05/2020 Negative     Ketones, Urine 08/05/2020 Negative     Specific Gravity, UA 08/05/2020 1.018     Blood, Urine 08/05/2020 Negative     pH, UA 08/05/2020 6.0     Protein, UA 08/05/2020 Negative     Urobilinogen, Urine 08/05/2020 0.2     Nitrite, Urine 08/05/2020 Negative     Leukocyte Esterase, Urine 08/05/2020 Negative     Microscopic Examination 08/05/2020 Not Indicated     Urine Type 08/05/2020 [place]    Abstract on 06/16/2020   Component Date Value    Diabetic Retinopathy 06/08/2020 Negative        Assessment: Stable    Plan:              ECG and hemoccults  Hypertension - well controlled, continue medicine  Hyperlipidemia - labs good, continue medicine   Hypothyroidism - labs good, same regimen  Type 2 diabetes - labs good, continue medicine   ASHD - stable, continue medicines managed by Dr. Wolf Members ca[facility]  Advised to make a dental appointment  Hearing - ongoing, continue to monitor     Aureliano Bhardwaj MD

## 2020-08-31 ENCOUNTER — OFFICE VISIT (OUTPATIENT)
Dept: FAMILY MEDICINE CLINIC | Age: 80
End: 2020-08-31
Payer: MEDICARE

## 2020-08-31 VITALS
DIASTOLIC BLOOD PRESSURE: 72 MMHG | HEIGHT: 66 IN | OXYGEN SATURATION: 98 % | TEMPERATURE: 98 F | BODY MASS INDEX: 29.09 KG/M2 | SYSTOLIC BLOOD PRESSURE: 136 MMHG | HEART RATE: 60 BPM | WEIGHT: 181 LBS | RESPIRATION RATE: 16 BRPM

## 2020-08-31 PROBLEM — M1A.09X0 IDIOPATHIC CHRONIC GOUT OF MULTIPLE SITES WITHOUT TOPHUS: Status: ACTIVE | Noted: 2020-08-31

## 2020-08-31 PROCEDURE — 99214 OFFICE O/P EST MOD 30 MIN: CPT | Performed by: FAMILY MEDICINE

## 2020-08-31 ASSESSMENT — ENCOUNTER SYMPTOMS: RESPIRATORY NEGATIVE: 1

## 2020-08-31 NOTE — PROGRESS NOTES
Subjective:      Patient ID: Araceli Amador is a 67 y.o. male. HPI Pt is a of 67 y.o. male comes in today with   Chief Complaint   Patient presents with    Established New Doctor     No gout flares on allopurinol  Just taking 1/2. Dm has been stable on meds. Walks every day. Goes to the gym. On lisinopril for hypertension    On thyroid med as prescribed. No Known Allergies    Current Outpatient Medications on File Prior to Visit   Medication Sig Dispense Refill    blood glucose test strips (ONE TOUCH ULTRA TEST) strip USE AS DIRECTED TWICE DAILY 200 strip 3    glipiZIDE-metformin (METAGLIP) 5-500 MG per tablet Take 1 tablet by mouth 2 times daily (before meals) 180 tablet 3    atorvastatin (LIPITOR) 20 MG tablet TAKE ONE TABLET BY MOUTH ONCE DAILY 90 tablet 3    allopurinol (ZYLOPRIM) 100 MG tablet TAKE ONE & ONE-HALF TABLETS BY MOUTH ONCE DAILY 135 tablet 3    levothyroxine (SYNTHROID) 88 MCG tablet TAKE ONE TABLET BY MOUTH ONCE DAILY 90 tablet 3    lisinopril (PRINIVIL;ZESTRIL) 20 MG tablet TAKE ONE TABLET BY MOUTH ONCE DAILY 90 tablet 3    atenolol (TENORMIN) 25 MG tablet Take 0.5 tablets by mouth daily 90 tablet 3    ONE TOUCH LANCETS MISC 1 each by Does not apply route 2 times daily 200 each 3    famotidine (PEPCID) 10 MG tablet Take 10 mg by mouth nightly as needed       Multiple Vitamins-Minerals (CENTRUM SILVER PO) Take  by mouth. No current facility-administered medications on file prior to visit.         Past Medical History:   Diagnosis Date    ASHD (arteriosclerotic heart disease)     Basal cell carcinoma Jan., 2004 & Feb., 2007    resection of multiple , Ca lip    Bell's palsy Nov., 2003    Cardiac catheterization as cause of abnormal reaction of patient, or of later complication, without mention of misadventure at time of procedure Dec., 2011    Dr. Javi Deal - R-30%,Circ-nl,LAD-75%,diag-95%,EF-60%    Diverticulosis     Gastric AVM     GERD (gastroesophageal reflux disease)     HTN (hypertension)     Hyperlipidemia     Hypothyroidism 2003    Melanoma [facility]) *Aug., 2016    Dr. Alex Lopez NIDDM (non-insulin dependent diabetes mellitus) 2005    Spinal stenosis *Febr., 2017    Severe at C3-C4 with severe cord compression and myelopathy       Past Surgical History:   Procedure Laterality Date   [address]    normal, LAD:40%, 50%    CARDIAC CATHETERIZATION  Dec., 2011    Dr. Maryuri Hernandez - LAD-75%,duag-95%, R-Normal,Circ-normal    CERVICAL [facility] [address] SURGERY  *May, 2017    Dr. Booker Daniel - ACDF - C4-C5    COLONOSCOPY  Sept., 2007 ( 2012 )     Dr. Alex Raygoza - diverticulosis, hemorrhoids    COLONOSCOPY  Dec. 18, 2012 ( 2017 )     Dr. Forest Amin - sigmoid diverticulosis    COLONOSCOPY  *Dec.5, 2017 ( prn )    Dr. Forest Amin - diverticulosis   [facility]  March, 2015    Dr. Prashanth Knight - rubber banding    HERNIA REPAIR      Right inguinal     INTRACAPSULAR CATARACT EXTRACTION  *July, 2019    Dr. Katherine Tran - bilateral    UPPER GASTROINTESTINAL ENDOSCOPY  April, 2003    AV malformation, Booth's       Social History     Socioeconomic History    Marital status:      Spouse name: Not on file    Number of children: 4    Years of education: Not on file    Highest education level: Not on file   Occupational History    Not on file   Social Needs    Financial resource strain: Not on file    Food insecurity     Worry: Not on file     Inability: Not on file   [employer] needs     Medical: Not on file     Non-medical: Not on file   Tobacco Use    Smoking status: Never Smoker    Smokeless tobacco: Never Used   Substance and Sexual Activity    Alcohol use: Yes     Comment: rarely    Drug use: Not on file    Sexual activity: Not on file   Lifestyle    Physical activity     Days per week: Not on file     Minutes per session: Not on file    Stress: Not on file   Relationships    Social connections     Talks on phone: Not on file     Gets together: Not on file Attends Mu-ism service: Not on file     Active member of club or organization: Not on file     Attends meetings of clubs or organizations: Not on file     Relationship status: Not on file    Intimate partner violence     Fear of current or ex partner: Not on file     Emotionally abused: Not on file     Physically abused: Not on file     Forced sexual activity: Not on file   Other Topics Concern    Not on file   Social History Narrative    Living Will:  Yes       Social History     Substance and Sexual Activity   Drug Use Not on file       Family History   Problem Relation Age of Onset    Other Father 80         ASHD    Other Mother 80         - dementia    Heart Disease Neg Hx     High Blood Pressure Neg Hx     High Cholesterol Neg Hx      Vitals:    20 1012   BP: 136/72   Site: Right Upper Arm   Position: Sitting   Cuff Size: Medium Adult   Pulse: 60   Resp: 16   Temp: 98 °F (36.7 °C)   TempSrc: Tympanic   SpO2: 98%   Weight: 181 lb (82.1 kg)   Height: 5' 6\" (1.676 m)        Review of Systems   Constitutional: Negative. Respiratory: Negative. Cardiovascular: Negative. Objective:   Physical Exam  Constitutional:       Appearance: Normal appearance. He is well-developed. HENT:      Head: Normocephalic and atraumatic. Mouth/Throat:      Pharynx: No oropharyngeal exudate. Eyes:      Conjunctiva/sclera: Conjunctivae normal.   Neck:      Musculoskeletal: Neck supple. Thyroid: No thyromegaly. Vascular: No carotid bruit. Cardiovascular:      Rate and Rhythm: Normal rate and regular rhythm. Heart sounds: Normal heart sounds. No murmur. Pulmonary:      Effort: Pulmonary effort is normal.      Breath sounds: Normal breath sounds. No rales. Lymphadenopathy:      Cervical: No cervical adenopathy. Skin:     General: Skin is warm and dry. Nails: There is no clubbing.      Neurological:      Mental Status: He is alert and oriented to person, place, and time. Cranial Nerves: No cranial nerve deficit. Assessment:       Diagnosis Orders   1. Essential hypertension     2. Hypothyroidism, unspecified type     3. Type 2 diabetes mellitus without complication, without long-term current use of insulin ([address]     4. Idiopathic chronic gout of multiple sites without tophus            Plan:      Lizeth Israel was seen today for established new doctor. Diagnoses and all orders for this visit:    Essential hypertension  The current medical regimen is effective;  continue present plan and medications.    Hypothyroidism, unspecified type  Stable on levothyroxine  Type 2 diabetes mellitus without complication, without long-term current use of insulin ([address]  Has been well controlled   Idiopathic chronic gout of multiple sites without tophus  Stable on prophylaxis           Nisha Zamora MD

## 2020-09-11 LAB
CONTROL: ABNORMAL
HEMOCCULT STL QL: POSITIVE

## 2020-09-11 PROCEDURE — 82274 ASSAY TEST FOR BLOOD FECAL: CPT | Performed by: INTERNAL MEDICINE

## 2021-01-25 ENCOUNTER — TELEPHONE (OUTPATIENT)
Dept: FAMILY MEDICINE CLINIC | Age: 81
End: 2021-01-25

## 2021-01-25 RX ORDER — HYDROXYZINE HYDROCHLORIDE 25 MG/1
25 TABLET, FILM COATED ORAL EVERY 8 HOURS PRN
Qty: 30 TABLET | Refills: 0 | Status: SHIPPED | OUTPATIENT
Start: 2021-01-25 | End: 2021-02-10

## 2021-01-25 NOTE — TELEPHONE ENCOUNTER
Patient established on 8/31/20, should I schedule him for virtual or can you prescribe a medication that isn't controlled?

## 2021-01-25 NOTE — TELEPHONE ENCOUNTER
----- Message from Felicianoanata Dereje sent at 2021  4:39 PM EST -----  Subject: Message to Provider    QUESTIONS  Information for Provider? wife passed on  wanting something to help him get through the . something to calm his nerves if possible. nothing too strong. send to [address] [place] [address] [phone] Yvette Carlota [phone]  ---------------------------------------------------------------------------  --------------  Briana Olea INFO  What is the best way for the office to contact you? OK to leave message on   voicemail  Preferred Call Back Phone Number? [phone]  ---------------------------------------------------------------------------  --------------  SCRIPT ANSWERS  Relationship to Patient?  Self

## 2021-02-01 ENCOUNTER — OFFICE VISIT (OUTPATIENT)
Dept: FAMILY MEDICINE CLINIC | Age: 81
End: 2021-02-01
Payer: MEDICARE

## 2021-02-01 VITALS
TEMPERATURE: 97.5 F | DIASTOLIC BLOOD PRESSURE: 62 MMHG | HEIGHT: 66 IN | HEART RATE: 56 BPM | BODY MASS INDEX: 28.28 KG/M2 | OXYGEN SATURATION: 98 % | WEIGHT: 176 LBS | SYSTOLIC BLOOD PRESSURE: 124 MMHG

## 2021-02-01 DIAGNOSIS — M1A.09X0 IDIOPATHIC CHRONIC GOUT OF MULTIPLE SITES WITHOUT TOPHUS: ICD-10-CM

## 2021-02-01 DIAGNOSIS — F43.21 GRIEF REACTION: Primary | ICD-10-CM

## 2021-02-01 DIAGNOSIS — E11.9 TYPE 2 DIABETES MELLITUS WITHOUT COMPLICATION, WITHOUT LONG-TERM CURRENT USE OF INSULIN (HCC): ICD-10-CM

## 2021-02-01 DIAGNOSIS — E03.9 HYPOTHYROIDISM, UNSPECIFIED TYPE: ICD-10-CM

## 2021-02-01 PROCEDURE — 99214 OFFICE O/P EST MOD 30 MIN: CPT | Performed by: FAMILY MEDICINE

## 2021-02-01 NOTE — PROGRESS NOTES
Dorina MILLARD [facility]  (:  1940) is a 79 y.o. male,Established patient, here for evaluation of the following chief complaint(s):  6 Month Follow-Up and Discuss Medications (Patient was prescribed hydroxyzine, not much relief.)      ASSESSMENT/PLAN:  Jessica Juarez was seen today for 6 month follow-up and discuss medications. Diagnoses and all orders for this visit:    Grief reaction  Strongly encouraged counseling, self care. Only took atarax once. Can take more often if needed. If not effective will call back  Idiopathic chronic gout of multiple sites without tophus  -     URIC ACID; Future  Has been stable. Recheck ordered  Hypothyroidism, unspecified type  -     TSH without Reflex; Future  Stable on current dose  Type 2 diabetes mellitus without complication, without long-term current use of insulin (HCC)  -     Hemoglobin A1C; Future  -     Lipid Panel; Future  -     Comprehensive Metabolic Panel; Future  Has been well controlled  Discussed possibly stopping glipizide         No follow-ups on file. SUBJECTIVE/OBJECTIVE:  ZAKIA   Pt is a of 79 y.o. male comes in today with   Chief Complaint   Patient presents with    6 Month Follow-Up    Discuss Medications     Patient was prescribed hydroxyzine, not much relief. Wife . Had to do cpr at home. Was very traumatic. No gout flares on allopurinol  Just taking 1/2.     Dm has been stable on meds. a1c in august was 6.4.     On lisinopril for hypertension     Vitals:    21 1355   BP: 124/62   Site: Left Upper Arm   Position: Sitting   Cuff Size: Small Adult   Pulse: 56   Temp: 97.5 °F (36.4 °C)   TempSrc: Temporal   SpO2: 98%   Weight: 176 lb (79.8 kg)   Height: 5' 6\" (1.676 m)        Review of Systems    Physical Exam            An electronic signature was used to authenticate this note.     --Gerald Leblanc MD

## 2021-02-03 DIAGNOSIS — E11.9 TYPE 2 DIABETES MELLITUS WITHOUT COMPLICATION, WITHOUT LONG-TERM CURRENT USE OF INSULIN (HCC): ICD-10-CM

## 2021-02-03 DIAGNOSIS — M1A.09X0 IDIOPATHIC CHRONIC GOUT OF MULTIPLE SITES WITHOUT TOPHUS: ICD-10-CM

## 2021-02-03 DIAGNOSIS — E03.9 HYPOTHYROIDISM, UNSPECIFIED TYPE: ICD-10-CM

## 2021-02-03 LAB
A/G RATIO: 1.8 (ref 1.1–2.2)
ALBUMIN SERPL-MCNC: 4.3 G/DL (ref 3.4–5)
ALP BLD-CCNC: 61 U/L (ref 40–129)
ALT SERPL-CCNC: 23 U/L (ref 10–40)
ANION GAP SERPL CALCULATED.3IONS-SCNC: 11 MMOL/L (ref 3–16)
AST SERPL-CCNC: 26 U/L (ref 15–37)
BILIRUB SERPL-MCNC: 0.9 MG/DL (ref 0–1)
BUN BLDV-MCNC: 18 MG/DL (ref 7–20)
CALCIUM SERPL-MCNC: 9.1 MG/DL (ref 8.3–10.6)
CHLORIDE BLD-SCNC: 101 MMOL/L (ref 99–110)
CHOLESTEROL, TOTAL: 110 MG/DL (ref 0–199)
CO2: 25 MMOL/L (ref 21–32)
CREAT SERPL-MCNC: 1 MG/DL (ref 0.8–1.3)
GFR AFRICAN AMERICAN: >60
GFR NON-AFRICAN AMERICAN: >60
GLOBULIN: 2.4 G/DL
GLUCOSE BLD-MCNC: 159 MG/DL (ref 70–99)
HDLC SERPL-MCNC: 29 MG/DL (ref 40–60)
LDL CHOLESTEROL CALCULATED: 62 MG/DL
POTASSIUM SERPL-SCNC: 4.4 MMOL/L (ref 3.5–5.1)
SODIUM BLD-SCNC: 137 MMOL/L (ref 136–145)
TOTAL PROTEIN: 6.7 G/DL (ref 6.4–8.2)
TRIGL SERPL-MCNC: 94 MG/DL (ref 0–150)
TSH SERPL DL<=0.05 MIU/L-ACNC: 1.94 UIU/ML (ref 0.27–4.2)
URIC ACID, SERUM: 8.3 MG/DL (ref 3.5–7.2)
VLDLC SERPL CALC-MCNC: 19 MG/DL

## 2021-02-04 LAB
ESTIMATED AVERAGE GLUCOSE: 145.6 MG/DL
HBA1C MFR BLD: 6.7 %

## 2021-02-09 ENCOUNTER — TELEPHONE (OUTPATIENT)
Dept: FAMILY MEDICINE CLINIC | Age: 81
End: 2021-02-09

## 2021-02-09 DIAGNOSIS — Z12.5 SCREENING PSA (PROSTATE SPECIFIC ANTIGEN): Primary | ICD-10-CM

## 2021-02-09 NOTE — TELEPHONE ENCOUNTER
PSA test requested. One was not ordered with recent labwork. I advised patient his last PSA was 8/2020. He would still like one added and he would like a call to advise him when this is in the system please.

## 2021-02-10 RX ORDER — HYDROXYZINE HYDROCHLORIDE 25 MG/1
25 TABLET, FILM COATED ORAL EVERY 8 HOURS PRN
Qty: 30 TABLET | Refills: 0 | Status: SHIPPED | OUTPATIENT
Start: 2021-02-10 | End: 2021-02-20

## 2021-02-11 DIAGNOSIS — Z12.5 SCREENING PSA (PROSTATE SPECIFIC ANTIGEN): ICD-10-CM

## 2021-02-11 LAB — PROSTATE SPECIFIC ANTIGEN: 0.93 NG/ML (ref 0–4)

## 2021-02-25 NOTE — TELEPHONE ENCOUNTER
QUESTIONS   Name of Medication? glipiZIDE-metformin (METAGLIP) 5-500 MG per tablet   Patient-reported dosage and instructions? 2 tablets daily   How many days do you have left? 28   Preferred Pharmacy? [facility] phone number (if available)? [phone]   ---------------------------------------------------------------------------   --------------     Name of Medication? atorvastatin (LIPITOR) 20 MG tablet   Patient-reported dosage and instructions? 1 tablet daily   How many days do you have left? 7   Preferred Pharmacy? [facility] phone number (if available)? [phone]   ---------------------------------------------------------------------------   --------------     Name of Medication? lisinopril (PRINIVIL;ZESTRIL) 20 MG tablet   Patient-reported dosage and instructions? 1 tablet daily   How many days do you have left? 14   Preferred Pharmacy? [facility] phone number (if available)? [phone]   ---------------------------------------------------------------------------   --------------     Name of Medication? levothyroxine (SYNTHROID) 88 MCG tablet   Patient-reported dosage and instructions? 1 tablet daily   How many days do you have left? 0   Preferred Pharmacy? [facility] phone number (if available)? [phone]   ---------------------------------------------------------------------------   --------------     Name of Medication? allopurinol (ZYLOPRIM) 100 MG tablet   Patient-reported dosage and instructions? half tablet daily   How many days do you have left? 14   Preferred Pharmacy? [facility] phone number (if available)?  [phone]   ---------------------------------------------------------------------------   --------------     Name of Medication? blood glucose test strips (ONE TOUCH ULTRA TEST) strip   Patient-reported dosage and instructions? twice daily   How many days do you have left? 30   Preferred Pharmacy? [facility] phone number (if available)? [phone]   ---------------------------------------------------------------------------   --------------   Dru Cogan INFO   What is the best way for the office to contact you? OK to leave message on   voicemail   Preferred Call Back Phone Number?  [phone]

## 2021-02-25 NOTE — TELEPHONE ENCOUNTER
----- Message from Sandra Bautista sent at 2/25/2021  3:44 PM EST -----  Subject: Refill Request    QUESTIONS  Name of Medication? atorvastatin (LIPITOR) 20 MG tablet  Patient-reported dosage and instructions? 20MG 1x a day  How many days do you have left? 14  Preferred Pharmacy? [facility] phone number (if available)? [phone]  ---------------------------------------------------------------------------  --------------  Hugo Valhalla INFO  What is the best way for the office to contact you? OK to leave message on   voicemail  Preferred Call Back Phone Number?  [phone]

## 2021-02-26 RX ORDER — ATORVASTATIN CALCIUM 20 MG/1
TABLET, FILM COATED ORAL
Qty: 90 TABLET | Refills: 3 | Status: SHIPPED | OUTPATIENT
Start: 2021-02-26 | End: 2022-02-07 | Stop reason: SDUPTHER

## 2021-02-26 RX ORDER — GLUCOSAMINE HCL/CHONDROITIN SU 500-400 MG
CAPSULE ORAL
Qty: 200 STRIP | Refills: 0 | Status: SHIPPED | OUTPATIENT
Start: 2021-02-26 | End: 2021-05-17

## 2021-02-26 RX ORDER — LEVOTHYROXINE SODIUM 88 UG/1
TABLET ORAL
Qty: 90 TABLET | Refills: 3 | Status: SHIPPED | OUTPATIENT
Start: 2021-02-26 | End: 2022-02-07 | Stop reason: SDUPTHER

## 2021-02-26 RX ORDER — GLIPIZIDE AND METFORMIN HCL 5; 500 MG/1; MG/1
1 TABLET, FILM COATED ORAL
Qty: 180 TABLET | Refills: 3 | Status: SHIPPED | OUTPATIENT
Start: 2021-02-26 | End: 2022-02-07 | Stop reason: SDUPTHER

## 2021-02-26 RX ORDER — ALLOPURINOL 100 MG/1
TABLET ORAL
Qty: 135 TABLET | Refills: 3 | Status: SHIPPED | OUTPATIENT
Start: 2021-02-26 | End: 2021-12-08 | Stop reason: DRUGHIGH

## 2021-03-05 NOTE — PROGRESS NOTES
[address]    3/12/2021    Toribio Shah [address] (:  1940) is a 79 y.o. male is here for follow-up and management of CAD and modifiable risk factors. Referring Provider: Collins Padilla MD    HISTORY:  Mr. Shah [address] has a history of coronary artery disease treated medically. Additional history includes HTN, Hyperlipidemia, DM. He has had two episodes of syncope (since ) while getting out of bed to go to the restroom which occurred while having severe leg cramps and/or while urinating. Patient had associated nausea and diaphoresis. He was seen in the ER for dizziness (10/2017) which was thought to be related to a sinus infection. 2013 Holter was negative for significant arrhythmias. Today, he states he has been under a lot of stress due to the recent passing of his wife 2021. He has noted some epigastric discomfort which is new. Does not appear exertional and does not cause worsening shortness of breath. He walks 4 to 5 miles per day without complaints of chest discomfort. He has noted some very mild dyspnea exertion with moderate to heavy exertion. He has noted that his blood pressure has been elevated. He did have the atenolol discontinued due to bradycardia. Prior to Visit Medications    Medication Sig Taking?  Authorizing Provider   lisinopril (PRINIVIL;ZESTRIL) 20 MG tablet TAKE ONE TABLET BY MOUTH ONCE IN THE MORNING AND 1/2 TABLET IN THE EVENING Yes Lan Malcolm MD   atorvastatin (LIPITOR) 20 MG tablet TAKE ONE TABLET BY MOUTH ONCE DAILY Yes Collins Padilla MD   levothyroxine (SYNTHROID) 88 MCG tablet TAKE ONE TABLET BY MOUTH ONCE DAILY Yes Collins Padilla MD   allopurinol (ZYLOPRIM) 100 MG tablet TAKE ONE & ONE-HALF TABLETS BY MOUTH ONCE DAILY  Patient taking differently: 50 mg daily  Yes Collins Padilla MD   glipiZIDE-metformin (METAGLIP) 5-500 MG per tablet Take 1 tablet by mouth 2 times daily (before meals) Yes Collins Padilla MD   blood diverticulosis, hemorrhoids    COLONOSCOPY  Dec. 18, 2012 ( 2017 )     Dr. Red Broussard - sigmoid diverticulosis    COLONOSCOPY  *Dec.5, 2017 ( prn )    Dr. Red Broussard - diverticulosis   [facility]      Dr. Temitope Mccracken - rubber banding    HERNIA REPAIR      Right inguinal     INTRACAPSULAR CATARACT EXTRACTION  *    Dr. Hermes Amaya - bilateral    UPPER GASTROINTESTINAL ENDOSCOPY      AV malformation, Booth's       Social History     Tobacco Use    Smoking status: Never Smoker    Smokeless tobacco: Never Used   Substance Use Topics    Alcohol use: Yes     Comment: rarely        Family History   Problem Relation Age of Onset    Other Father 80         ASHD    Other Mother 80         - dementia    Heart Disease Neg Hx     High Blood Pressure Neg Hx     High Cholesterol Neg Hx        PHYSICAL EXAMINATION:  Vitals:    21 0837 21 0947 21 0948   BP: 130/72 (!) 150/70 (!) 146/72   Site: Left Upper Arm Left Upper Arm Right Upper Arm   Position: Sitting Sitting Sitting   Cuff Size: Medium Adult     Pulse: 87     SpO2: 97%     Weight: 173 lb (78.5 kg)     Height: 5' 6\" (1.676 m)       Estimated body mass index is 27.92 kg/m² as calculated from the following:    Height as of this encounter: 5' 6\" (1.676 m). Weight as of this encounter: 173 lb (78.5 kg). Physical Exam  Constitutional:       Appearance: He is well-developed. He is not diaphoretic. HENT:      Head: Normocephalic and atraumatic. Eyes:      General: No scleral icterus. Extraocular Movements: Extraocular movements intact. Conjunctiva/sclera: Conjunctivae normal.   Neck:      Musculoskeletal: Normal range of motion and neck supple. Vascular: No JVD. Cardiovascular:      Rate and Rhythm: Normal rate and regular rhythm. Heart sounds: Normal heart sounds. No murmur. No friction rub. No gallop.     Pulmonary:      Effort: Pulmonary effort is normal. No respiratory distress. Breath sounds: Normal breath sounds. No wheezing or rales. Abdominal:      General: Bowel sounds are normal.      Palpations: Abdomen is soft. Tenderness: There is no abdominal tenderness. Musculoskeletal: Normal range of motion. Skin:     General: Skin is warm and dry. Findings: No rash. Neurological:      General: No focal deficit present. Mental Status: He is alert and oriented to person, place, and time. Psychiatric:         Mood and Affect: Mood normal.         Behavior: Behavior normal.         Thought Content: Thought content normal.         Judgment: Judgment normal.             I have reviewed all pertinent lab results and diagnostic testing. Lab Results   Component Value Date    CHOL 110 02/03/2021    TRIG 94 02/03/2021    HDL 29 02/03/2021    HDL 31 05/10/2012    LDLCALC 62 02/03/2021     Lab Results   Component Value Date     02/03/2021    K 4.4 02/03/2021     02/03/2021    CO2 25 02/03/2021    GLUCOSE 159 02/03/2021    BUN 18 02/03/2021    CREATININE 1.0 02/03/2021    CALCIUM 9.1 02/03/2021    GFR >60 06/07/2013    GFRAA >60 02/03/2021    GFRAA >60 06/07/2013     Lab Results   Component Value Date    ALT 23 02/03/2021    AST 26 02/03/2021       ECG 2/4/19: SB, RBBB, 47 bpm    Myoview GXT 3/5/18:  Summary    -There is a small sized, mild to moderate intensity, fixed defect of the    inferior which is consistent with likely secondary to bowel artifact.    -Normal LV function.    -Low risk scan.       RBBB on resting ECG      Plain GXT 10/16/15:  He exercised 9:30, achieved target HR, with appropriate BP response.      Echo 5/31/14:  Summary   -Normal left ventricle size, wall thickness and systolic function with an   estimated ejection fraction of 55%.    -No regional wall motion abnormalities are seen.   -There is reversal of E/A inflow velocities across the mitral valve   suggesting impaired left ventricular relaxation.   -Aortic valve appears sclerotic but opens adequately.   -Trivial mitral and mild pulmonic regurgitation is present.   -The left atrium is at the upper limits of normal in size. [facility] 12/2011: - LAD 75% with flow wire 0.87, D-2 95%, RCA prox 30%, treated medically. ASSESSMENT/PLAN:  1. Coronary artery disease due to lipid rich plaque  - [facility] 2011 showed non flow limiting stenosis in LAD, D-2 95%, RCA prox 30%. MPI prior to angiogram showed no reversible ischemia. Treated medically. -  2015 plain GXT was normal, no ischemic changes  - 3/5/18 MPI - small sized small to moderate intensity fixed defect of inferior wall likely secondary to bowel artifact  -  tx with ASA, atorvastatin,     Plan: Chest discomfort likely atypical.  Recommend updating cardiac work-up with 2D echo with Doppler and Myoview stress test.    2.  Bradycardia  - 7/6/19 ECG - SB, HR 47, RBBB  - 8/10/20 ECG - SB, HR 53 bpm, RBBB       Plan: Resolved with discontinuation of atenolol. Continue to avoid negative chronotropic agents. 3. Essential hypertension  -  tx with Lisinopril 20 mg  - 2/3/21 labs - K+ 4.4, Creat 1.0   -  Blood pressure (!) 146/72, pulse 87, height 5' 6\" (1.676 m), weight 173 lb (78.5 kg), SpO2 97 %. Plan : Suboptimal control. Increase lisinopril to 20 mg in the morning and 10 mg in the evening. May need to increase lisinopril to 20 mg twice daily. 4. Hyperlipidemia  - tx with Atorvastatin 20 mg.    - 2/3/21 -- TC- 110, TG- 94, HDL- 29, LDL- 62.  Liver enzymes normal.   Goal LDL < 70 (DM)     Plan : Well-controlled. Continue current medical regimen. Goal LDL less than 70.    5. Light-headedness  -  episodes of light headedness and syncope thought to be vasovagal in nature as episodes, often occurring with pain or micturition, episodes associated with nausea and diaphoresis  -  8/7/13 Holter -- sinus rhythm with avg HR 63 (). 18 PVCs, 0.2% PAC, short runs of PSVT     Plan : Resolved.     6.  RBBB  -  noted on ECG during stress test 3/2018.    - RBBB pm ECG from 2/4/2019 and 8/10/20.      7.  Diabetes mellitus  -   2/3/21 Hgb A1C - 6.7. On glipizide-metformin      Plan :  1. Increase lisinopril to 20 mg in the morning and 10 mg in the evening. Titrate as needed for blood pressure control. 2.  He will call us in 2 weeks with blood pressure results. 3.  2D echo with Doppler. 4.  Myoview stress test.  5.  RTC to NP in 2 months and MD in 1 year. Scribe's attestation: This note was scribed in the presence of Tristian Greenberg M.D. by Danyell Juarez RN    Physician Attestation: The scribe's documentation has been prepared under my direction and personally reviewed by me in its entirety. I confirm that the note above accurately reflects all work, treatment, procedures, and medical decision making performed by me. An  electronic signature was used to authenticate this note. Donnie Joiner MD, [address] [facility], [address]

## 2021-03-12 ENCOUNTER — OFFICE VISIT (OUTPATIENT)
Dept: CARDIOLOGY CLINIC | Age: 81
End: 2021-03-12
Payer: MEDICARE

## 2021-03-12 VITALS
HEART RATE: 87 BPM | OXYGEN SATURATION: 97 % | SYSTOLIC BLOOD PRESSURE: 146 MMHG | DIASTOLIC BLOOD PRESSURE: 72 MMHG | BODY MASS INDEX: 27.8 KG/M2 | HEIGHT: 66 IN | WEIGHT: 173 LBS

## 2021-03-12 DIAGNOSIS — E11.9 TYPE 2 DIABETES MELLITUS WITHOUT COMPLICATION, WITHOUT LONG-TERM CURRENT USE OF INSULIN (HCC): ICD-10-CM

## 2021-03-12 DIAGNOSIS — E78.49 OTHER HYPERLIPIDEMIA: ICD-10-CM

## 2021-03-12 DIAGNOSIS — I25.10 ASHD (ARTERIOSCLEROTIC HEART DISEASE): Primary | ICD-10-CM

## 2021-03-12 DIAGNOSIS — R42 LIGHT HEADEDNESS: ICD-10-CM

## 2021-03-12 DIAGNOSIS — I45.10 RBBB: ICD-10-CM

## 2021-03-12 DIAGNOSIS — R07.89 CHEST DISCOMFORT: ICD-10-CM

## 2021-03-12 DIAGNOSIS — R00.1 BRADYCARDIA: ICD-10-CM

## 2021-03-12 DIAGNOSIS — I10 ESSENTIAL HYPERTENSION: ICD-10-CM

## 2021-03-12 PROCEDURE — 99214 OFFICE O/P EST MOD 30 MIN: CPT | Performed by: INTERNAL MEDICINE

## 2021-03-12 PROCEDURE — 93000 ELECTROCARDIOGRAM COMPLETE: CPT | Performed by: INTERNAL MEDICINE

## 2021-03-12 RX ORDER — LISINOPRIL 20 MG/1
TABLET ORAL
Qty: 135 TABLET | Refills: 1 | Status: SHIPPED | OUTPATIENT
Start: 2021-03-12 | End: 2021-05-10

## 2021-03-12 NOTE — LETTER
diabetes mellitus)     Spinal stenosis *2017    Severe at C3-C4 with severe cord compression and myelopathy       Past Surgical History:   Procedure Laterality Date    CARDIAC CATHETERIZATION  1998    normal, LAD:40%, 50%    CARDIAC CATHETERIZATION  Dec., 2011    Dr. Preston Baires - LAD-75%,duag-95%, R-Normal,Circ-normal    CERVICAL [facility]  *May, 2017    Dr. Jorge Marie - ACDF - C4-C5    COLONOSCOPY  2007 (  )     Dr. Suha Price - diverticulosis, hemorrhoids    COLONOSCOPY  Dec. 18, 2012 (  )     Dr. Queenie Gan - sigmoid diverticulosis    COLONOSCOPY  *Dec.5, 2017 ( prn )    Dr. Jerome Bimmaria isabel - diverticulosis   [facility]      Dr. Demi Sidhu - rubber banding    HERNIA REPAIR      Right inguinal     INTRACAPSULAR CATARACT EXTRACTION  *    Dr. Zulema Tucker - bilateral    UPPER GASTROINTESTINAL ENDOSCOPY      AV malformation, Booth's       Social History     Tobacco Use    Smoking status: Never Smoker    Smokeless tobacco: Never Used   Substance Use Topics    Alcohol use: Yes     Comment: rarely        Family History   Problem Relation Age of Onset    Other Father 80         ASHD    Other Mother 80         - dementia    Heart Disease Neg Hx     High Blood Pressure Neg Hx     High Cholesterol Neg Hx        PHYSICAL EXAMINATION:  Vitals:    21 0837 21 0947 21 0948   BP: 130/72 (!) 150/70 (!) 146/72   Site: Left Upper Arm Left Upper Arm Right Upper Arm   Position: Sitting Sitting Sitting   Cuff Size: Medium Adult     Pulse: 87     SpO2: 97%     Weight: 173 lb (78.5 kg)     Height: 5' 6\" (1.676 m)       Estimated body mass index is 27.92 kg/m² as calculated from the following:    Height as of this encounter: 5' 6\" (1.676 m). Weight as of this encounter: 173 lb (78.5 kg). Physical Exam  Constitutional:       Appearance: He is well-developed. He is not diaphoretic. HENT:      Head: Normocephalic and atraumatic.    Eyes: General: No scleral icterus. Extraocular Movements: Extraocular movements intact. Conjunctiva/sclera: Conjunctivae normal.   Neck:      Musculoskeletal: Normal range of motion and neck supple. Vascular: No JVD. Cardiovascular:      Rate and Rhythm: Normal rate and regular rhythm. Heart sounds: Normal heart sounds. No murmur. No friction rub. No gallop. Pulmonary:      Effort: Pulmonary effort is normal. No respiratory distress. Breath sounds: Normal breath sounds. No wheezing or rales. Abdominal:      General: Bowel sounds are normal.      Palpations: Abdomen is soft. Tenderness: There is no abdominal tenderness. Musculoskeletal: Normal range of motion. Skin:     General: Skin is warm and dry. Findings: No rash. Neurological:      General: No focal deficit present. Mental Status: He is alert and oriented to person, place, and time. Psychiatric:         Mood and Affect: Mood normal.         Behavior: Behavior normal.         Thought Content: Thought content normal.         Judgment: Judgment normal.             I have reviewed all pertinent lab results and diagnostic testing.           Lab Results   Component Value Date    CHOL 110 02/03/2021    TRIG 94 02/03/2021    HDL 29 02/03/2021    HDL 31 05/10/2012    LDLCALC 62 02/03/2021     Lab Results   Component Value Date     02/03/2021    K 4.4 02/03/2021     02/03/2021    CO2 25 02/03/2021    GLUCOSE 159 02/03/2021    BUN 18 02/03/2021    CREATININE 1.0 02/03/2021    CALCIUM 9.1 02/03/2021    GFR >60 06/07/2013    GFRAA >60 02/03/2021    GFRAA >60 06/07/2013     Lab Results   Component Value Date    ALT 23 02/03/2021    AST 26 02/03/2021       ECG 2/4/19: SB, RBBB, 47 bpm    Myoview GXT 3/5/18:  Summary    -There is a small sized, mild to moderate intensity, fixed defect of the    inferior which is consistent with likely secondary to bowel artifact.    -Normal LV function.    -Low risk scan.       RBBB on resting ECG      Plain GXT 10/16/15:  He exercised 9:30, achieved target HR, with appropriate BP response.      Echo 5/31/14:  Summary   -Normal left ventricle size, wall thickness and systolic function with an   estimated ejection fraction of 55%.  -No regional wall motion abnormalities are seen.   -There is reversal of E/A inflow velocities across the mitral valve   suggesting impaired left ventricular relaxation.   -Aortic valve appears sclerotic but opens adequately.   -Trivial mitral and mild pulmonic regurgitation is present.   -The left atrium is at the upper limits of normal in size. [facility] 12/2011: - LAD 75% with flow wire 0.87, D-2 95%, RCA prox 30%, treated medically. ASSESSMENT/PLAN:  1. Coronary artery disease due to lipid rich plaque  - [facility] 2011 showed non flow limiting stenosis in LAD, D-2 95%, RCA prox 30%. MPI prior to angiogram showed no reversible ischemia. Treated medically. -  2015 plain GXT was normal, no ischemic changes  - 3/5/18 MPI - small sized small to moderate intensity fixed defect of inferior wall likely secondary to bowel artifact  -  tx with ASA, atorvastatin,     Plan: Chest discomfort likely atypical.  Recommend updating cardiac work-up with 2D echo with Doppler and Myoview stress test.    2.  Bradycardia  - 7/6/19 ECG - SB, HR 47, RBBB  - 8/10/20 ECG - SB, HR 53 bpm, RBBB       Plan: Resolved with discontinuation of atenolol. Continue to avoid negative chronotropic agents. 3. Essential hypertension  -  tx with Lisinopril 20 mg  - 2/3/21 labs - K+ 4.4, Creat 1.0   -  Blood pressure (!) 146/72, pulse 87, height 5' 6\" (1.676 m), weight 173 lb (78.5 kg), SpO2 97 %. Plan : Suboptimal control. Increase lisinopril to 20 mg in the morning and 10 mg in the evening. May need to increase lisinopril to 20 mg twice daily.     4. Hyperlipidemia  - tx with Atorvastatin 20 mg.    - 2/3/21 -- TC- 110, TG- 94, HDL- 29, LDL- 62.  Liver enzymes normal.   Goal LDL < 70 (DM)

## 2021-03-12 NOTE — PATIENT INSTRUCTIONS
1.  Increase Lisinopril to 20 mg am (same dose) and add 10 mg (1/2 tab) in the evening  2. Continue to monitor blood pressure at home and record BP. Bring list of BP to nurse practitioner visit to review  3. Schedule echo and Myoview GXT soon   4. Check blood work - BMP about two weeks after Lisinopril increase   5. Nurse practitioner in two months to review BP readings on new Lisinopril dose  6.   One year with Dr. Willard Jack

## 2021-03-29 DIAGNOSIS — I10 ESSENTIAL HYPERTENSION: ICD-10-CM

## 2021-03-29 LAB
ANION GAP SERPL CALCULATED.3IONS-SCNC: 12 MMOL/L (ref 3–16)
BUN BLDV-MCNC: 19 MG/DL (ref 7–20)
CALCIUM SERPL-MCNC: 9.2 MG/DL (ref 8.3–10.6)
CHLORIDE BLD-SCNC: 101 MMOL/L (ref 99–110)
CO2: 25 MMOL/L (ref 21–32)
CREAT SERPL-MCNC: 1 MG/DL (ref 0.8–1.3)
GFR AFRICAN AMERICAN: >60
GFR NON-AFRICAN AMERICAN: >60
GLUCOSE BLD-MCNC: 113 MG/DL (ref 70–99)
POTASSIUM SERPL-SCNC: 4.7 MMOL/L (ref 3.5–5.1)
SODIUM BLD-SCNC: 138 MMOL/L (ref 136–145)

## 2021-03-30 ENCOUNTER — TELEPHONE (OUTPATIENT)
Dept: CARDIOLOGY CLINIC | Age: 81
End: 2021-03-30

## 2021-03-30 NOTE — TELEPHONE ENCOUNTER
----- Message from Clint Miller MD sent at 3/30/2021  2:04 PM EDT -----  Your glucose is very mildly elevated otherwise labs look good. Replied to patient via [employer].
Detail Level: Detailed
Patient notified of test results, verbalized understanding.
Detail Level: Generalized

## 2021-03-31 ENCOUNTER — HOSPITAL ENCOUNTER (OUTPATIENT)
Dept: NON INVASIVE DIAGNOSTICS | Age: 81
Discharge: HOME | End: 2021-03-31
Payer: MEDICARE

## 2021-03-31 DIAGNOSIS — I25.10 ASHD (ARTERIOSCLEROTIC HEART DISEASE): ICD-10-CM

## 2021-03-31 DIAGNOSIS — R07.89 CHEST DISCOMFORT: ICD-10-CM

## 2021-03-31 LAB
LV EF: 50 %
LV EF: 64 %
LVEF MODALITY: NORMAL
LVEF MODALITY: NORMAL

## 2021-03-31 PROCEDURE — A9502 TC99M TETROFOSMIN: HCPCS | Performed by: INTERNAL MEDICINE

## 2021-03-31 PROCEDURE — 93306 TTE W/DOPPLER COMPLETE: CPT

## 2021-03-31 PROCEDURE — 78452 HT MUSCLE IMAGE SPECT MULT: CPT

## 2021-03-31 PROCEDURE — 93017 CV STRESS TEST TRACING ONLY: CPT | Performed by: INTERNAL MEDICINE

## 2021-03-31 PROCEDURE — 3430000000 HC RX DIAGNOSTIC RADIOPHARMACEUTICAL: Performed by: INTERNAL MEDICINE

## 2021-03-31 RX ADMIN — TETROFOSMIN 10 MILLICURIE: 1.38 INJECTION, POWDER, LYOPHILIZED, FOR SOLUTION INTRAVENOUS at 07:54

## 2021-03-31 RX ADMIN — TETROFOSMIN 30 MILLICURIE: 1.38 INJECTION, POWDER, LYOPHILIZED, FOR SOLUTION INTRAVENOUS at 09:48

## 2021-05-10 ENCOUNTER — OFFICE VISIT (OUTPATIENT)
Dept: CARDIOLOGY CLINIC | Age: 81
End: 2021-05-10
Payer: MEDICARE

## 2021-05-10 VITALS
DIASTOLIC BLOOD PRESSURE: 87 MMHG | OXYGEN SATURATION: 97 % | HEIGHT: 67 IN | HEART RATE: 72 BPM | SYSTOLIC BLOOD PRESSURE: 160 MMHG | BODY MASS INDEX: 27.39 KG/M2 | WEIGHT: 174.5 LBS

## 2021-05-10 DIAGNOSIS — E78.2 MIXED HYPERLIPIDEMIA: ICD-10-CM

## 2021-05-10 DIAGNOSIS — I10 ESSENTIAL HYPERTENSION: Primary | ICD-10-CM

## 2021-05-10 DIAGNOSIS — I25.10 CORONARY ARTERY DISEASE INVOLVING NATIVE CORONARY ARTERY OF NATIVE HEART WITHOUT ANGINA PECTORIS: ICD-10-CM

## 2021-05-10 PROCEDURE — 99214 OFFICE O/P EST MOD 30 MIN: CPT | Performed by: NURSE PRACTITIONER

## 2021-05-10 RX ORDER — LISINOPRIL 20 MG/1
20 TABLET ORAL 2 TIMES DAILY
Qty: 180 TABLET | Refills: 1 | Status: SHIPPED | OUTPATIENT
Start: 2021-05-10 | End: 2021-06-07 | Stop reason: SDUPTHER

## 2021-05-10 NOTE — PROGRESS NOTES
[facility]     Outpatient Follow Up Note    Cirilo Del Real is 79 y.o. male who presents today with a history of non-obst CAD, HTN, hyperlipidemia and dizziness (neg Holter)      CHIEF COMPLAINT / HPI:  Follow Up secondary to hypertension increasing lisinopril. His home BP has been running 140/78 ,  138/76. He's not found readings at 185/85 since his medication adjustment     Subjective:   He denies significant chest pain. There is no SOB/GUERRIER. The patient denies orthopnea/PND. The patient does not have swelling. The patients weight is stable . The patient is not experiencing palpitations or dizziness. He hasn't started his aspirin yet as recommended. These symptoms show no change since the last OV. With regard to medication therapy the patient has been compliant with prescribed regimen. They have tolerated therapy to date.      Past Medical History:   Diagnosis Date    ASHD (arteriosclerotic heart disease)     Basal cell carcinoma Jan., 2004 & Feb., 2007    resection of multiple , Ca lip    Bell's palsy Nov., 2003    Cardiac catheterization as cause of abnormal reaction of patient, or of later complication, without mention of misadventure at time of procedure Dec., 2011    Dr. Leone Math - R-30%,Circ-nl,LAD-75%,diag-95%,EF-60%    Diverticulosis     Gastric AVM     GERD (gastroesophageal reflux disease)     HTN (hypertension)     Hyperlipidemia     Hypothyroidism 2003    Melanoma [facility]) *Aug., 2016    Dr. Gabby Askew NIDDM (non-insulin dependent diabetes mellitus) 2005    Spinal stenosis *Febr., 2017    Severe at C3-C4 with severe cord compression and myelopathy     Social History:    Social History     Tobacco Use   Smoking Status Never Smoker   Smokeless Tobacco Never Used     Current Medications:  Current Outpatient Medications   Medication Sig Dispense Refill    esomeprazole (NEXIUM) 20 MG delayed release capsule take 1 capsule by oral route every 7 days at least 1 hour before a meal Refill for Focalin XR 10 mg capsule taking 1 daily. Focalin 5 mg tablet taking 1 at 4:30 pm, verified in EMR/MD note of  IOP with Dr. Blackwood.    Pt last seen IOP  Canceled 9/26/18  NO SHOW 10/15/18  Next appt. 2/11/19  Last prescribed  11/19/18 - 2 months provided for both doses. Last filled per PDMP on 11/19/18.    Routing to Dr. Blackwood  to authorize refill if appropriate   swallowing whole. Do not crush or chew granules.  lisinopril (PRINIVIL;ZESTRIL) 20 MG tablet TAKE ONE TABLET BY MOUTH ONCE IN THE MORNING AND 1/2 TABLET IN THE EVENING 135 tablet 1    atorvastatin (LIPITOR) 20 MG tablet TAKE ONE TABLET BY MOUTH ONCE DAILY 90 tablet 3    levothyroxine (SYNTHROID) 88 MCG tablet TAKE ONE TABLET BY MOUTH ONCE DAILY 90 tablet 3    allopurinol (ZYLOPRIM) 100 MG tablet TAKE ONE & ONE-HALF TABLETS BY MOUTH ONCE DAILY (Patient taking differently: 50 mg daily ) 135 tablet 3    glipiZIDE-metformin (METAGLIP) 5-500 MG per tablet Take 1 tablet by mouth 2 times daily (before meals) 180 tablet 3    blood glucose monitor strips Test one times a day & as needed for symptoms of irregular blood glucose. Dispense sufficient amount for indicated testing frequency plus additional to accommodate PRN testing needs. 200 strip 0    blood glucose test strips (ONE TOUCH ULTRA TEST) strip USE AS DIRECTED TWICE DAILY 200 strip 3    ONE TOUCH LANCETS MISC 1 each by Does not apply route 2 times daily 200 each 3    famotidine (PEPCID) 10 MG tablet Take 10 mg by mouth nightly as needed       Multiple Vitamins-Minerals (CENTRUM SILVER PO) Take  by mouth. No current facility-administered medications for this visit. REVIEW OF SYSTEMS:    CONSTITUTIONAL: No major weight gain or loss, fatigue, weakness, night sweats or fever. HEENT: No new vision difficulties or ringing in the ears. RESPIRATORY: No new SOB, PND, orthopnea or cough. CARDIOVASCULAR: See HPI  GI: No nausea, vomiting, diarrhea, constipation, abdominal pain or changes in bowel habits. : No urinary frequency, urgency, incontinence hematuria or dysuria. SKIN: No cyanosis or skin lesions. MUSCULOSKELETAL: No new muscle or joint pain. NEUROLOGICAL: No syncope or TIA-like symptoms.   PSYCHIATRIC: No anxiety, pain, insomnia or depression ; wife      Objective:   PHYSICAL EXAM:       Vitals:    05/10/21 2448 05/10/21 8515 BP: (!) 145/81 (!) 160/87   Site: Left Upper Arm Left Upper Arm   Position: Sitting Sitting   Cuff Size: Medium Adult Medium Adult   Pulse: 72    SpO2: 97%    Weight: 174 lb 8 oz (79.2 kg)    Height: 5' 7.2\" (1.707 m)         VITALS:  BP (!) 160/87 (Site: Left Upper Arm, Position: Sitting, Cuff Size: Medium Adult)   Pulse 72   Ht 5' 7.2\" (1.707 m)   Wt 174 lb 8 oz (79.2 kg)   SpO2 97%   BMI 27.17 kg/m²   CONSTITUTIONAL: Cooperative, no apparent distress, and appears well nourished / developed  NEUROLOGIC:  Awake and orientated to person, place and time. PSYCH: Calm affect. SKIN: Warm and dry. HEENT: Sclera non-icteric, normocephalic, neck supple, no elevation of JVP, normal carotid pulses with no bruits and thyroid normal size. LUNGS:  No increased work of breathing and clear to auscultation, no crackles or wheezing  CARDIOVASCULAR:  Regular rate 80 and rhythm with no murmurs, gallops, rubs, or abnormal heart sounds, normal PMI. The apical impulses not displaced  JVP less than 8 cm H2O  Heart tones are crisp and normal  Cervical veins are not engorged  The carotid upstroke is normal in amplitude and contour without delay or bruit  JVP is not elevated  ABDOMEN:  Normal bowel sounds, non-distended and non-tender to palpation  EXT: No edema, no calf tenderness. Pulses are present bilaterally.     DATA:    Lab Results   Component Value Date    ALT 23 02/03/2021    AST 26 02/03/2021    ALKPHOS 61 02/03/2021    BILITOT 0.9 02/03/2021     Lab Results   Component Value Date    CREATININE 1.0 03/29/2021    BUN 19 03/29/2021     03/29/2021    K 4.7 03/29/2021     03/29/2021    CO2 25 03/29/2021     Lab Results   Component Value Date    TSH 1.94 02/03/2021     Lab Results   Component Value Date    WBC 8.7 08/05/2020    HGB 13.4 (L) 08/05/2020    HCT 40.2 (L) 08/05/2020    MCV 94.8 08/05/2020     08/05/2020     No components found for: CHLPL  Lab Results   Component Value Date    TRIG 94 02/03/2021 TRIG 180 (H) 08/05/2020    TRIG 139 01/31/2020     Lab Results   Component Value Date    HDL 29 (L) 02/03/2021    HDL 30 (L) 08/05/2020    HDL 27 (L) 01/31/2020     Lab Results   Component Value Date    LDLCALC 62 02/03/2021    LDLCALC 77 08/05/2020    [address] 74 01/31/2020     Lab Results   Component Value Date    LABVLDL 19 02/03/2021    LABVLDL 36 08/05/2020    LABVLDL 28 01/31/2020     Radiology Review:  Pertinent images / reports were reviewed as a part of this visit and reveals the following:    Echo: 3/31/21:  Summary   Normal left ventricle size, wall thickness and systolic function with an   estimated ejection fraction of 50%. No regional wall motion abnormalities   are seen. E/e\"= 12.3 . Grade I diastolic dysfunction with normal LV filling pressures. Normal function of all valves. Stress Test: 3/31/21  Summary    The overall quality of the study is fair. There is subdiaphragmatic    attenuation.        Left ventricular cavity is mildly dilated at 128 ml. The right ventricle is    normal in size.        There is a small size perfusion defect of mild intensity in the apex, apical    inferior, and mid inferior regions. The defect is present at rest and    stress, consistent with artifact or scar. There is no reversibility.    Gated spect reveals normal myocardial thickening and wall motion.        Sum stress score of 7. There is no visual TID. Calculated tid is 1.01.        Myocardial perfusion is abnormal with fixed inferior defect. In comparison    to previous study, there is no change. Low-to-moderate risk scan        Angiogram: 12/2011: - LAD 75% with flow wire 0.87, D-2 95%, RCA prox 30%, treated medically      Assessment:      Diagnosis Orders   1. Essential hypertension   ~suboptimal on arrival ; elevated on recheck despite increasing lisinopril     2.  Coronary artery disease involving native coronary artery of native heart without angina pectoris   ~stable : offers no c/o   ~non-obst disease by cath '11  ~fixed defect by NM March '21  ~ statin. No BB d/t bradycardia. Had not started taking ASA as recommended  ~exercise: walks daily    3. Mixed hyperlipidemia   ~HDL not at goal   ~atorvastatin  ~A1c 6.7% : followed by PCP        I had the opportunity to review the clinical symptoms and presentation of Naveen Sports. Plan:     1. Increase lisinopril to 20 mg bid   Resume aspirin   2. F/U in four weeks ; he will bring his machine-cuff in for comparision    Overall the patient is stable from CV standpoint    I have addresed the patient's cardiac risk factors and adjusted pharmacologic treatment as needed. In addition, I have reinforced the need for patient directed risk factor modification. Further evaluation will be based upon the patient's clinical course and testing results. All questions and concerns were addressed to the patient. Alternatives to my treatment were discussed. The patient is not currently smoking. The risks related to smoking were reviewed with the patient. Recommend maintaining a smoke-free lifestyle. Patient is not on a beta-blocker : bradycardia  Patient is on an ace-i  Patient is on a statin    Antiplatelet therapy has been recommended / prescribed for this patient. Education conducted on adverse reactions including bleeding was discussed. The patient verbalizes understanding not to stop medications without discussing with us. Discussed exercise: 30-60 minutes 7 days/week. Walks 2.5 miles daily  Discussed Low saturated fat/INNA diet. SMBG 120. A1c6.4%    Thank you for allowing to us to participate in the care of Naveen Price.     RICARDO Tim    Documentation of today's visit sent to PCP

## 2021-05-10 NOTE — PATIENT INSTRUCTIONS
Increase lisinopril to 20 mg twice a day    appt in four weeks and bring your BP machine-cuff in with you for comparison

## 2021-05-17 RX ORDER — BLOOD SUGAR DIAGNOSTIC
STRIP MISCELLANEOUS
Qty: 200 STRIP | Refills: 0 | Status: SHIPPED | OUTPATIENT
Start: 2021-05-17 | End: 2021-12-03

## 2021-05-19 RX ORDER — LANCETS 30 GAUGE
1 EACH MISCELLANEOUS 2 TIMES DAILY
Qty: 100 EACH | Refills: 2 | Status: SHIPPED | OUTPATIENT
Start: 2021-05-19 | End: 2021-12-30

## 2021-06-07 ENCOUNTER — OFFICE VISIT (OUTPATIENT)
Dept: CARDIOLOGY CLINIC | Age: 81
End: 2021-06-07
Payer: MEDICARE

## 2021-06-07 VITALS
OXYGEN SATURATION: 97 % | BODY MASS INDEX: 26.97 KG/M2 | DIASTOLIC BLOOD PRESSURE: 72 MMHG | SYSTOLIC BLOOD PRESSURE: 130 MMHG | HEART RATE: 84 BPM | WEIGHT: 171.8 LBS | HEIGHT: 67 IN

## 2021-06-07 DIAGNOSIS — I10 ESSENTIAL HYPERTENSION: Primary | ICD-10-CM

## 2021-06-07 DIAGNOSIS — I25.10 CORONARY ARTERY DISEASE INVOLVING NATIVE CORONARY ARTERY OF NATIVE HEART WITHOUT ANGINA PECTORIS: ICD-10-CM

## 2021-06-07 DIAGNOSIS — E78.2 MIXED HYPERLIPIDEMIA: ICD-10-CM

## 2021-06-07 PROCEDURE — 99214 OFFICE O/P EST MOD 30 MIN: CPT | Performed by: NURSE PRACTITIONER

## 2021-06-07 RX ORDER — LISINOPRIL 20 MG/1
20 TABLET ORAL 2 TIMES DAILY
Qty: 180 TABLET | Refills: 1 | Status: SHIPPED | OUTPATIENT
Start: 2021-06-07 | End: 2021-12-08 | Stop reason: SDUPTHER

## 2021-06-07 NOTE — PROGRESS NOTES
[facility]     Outpatient Follow Up Note    Arcelia Dennison is 79 y.o. male who presents today with a history of non-obst CAD, HTN, hyperlipidemia and dizziness (neg Holter)      CHIEF COMPLAINT / HPI:  Follow Up secondary to hypertension increasing lisinopril to 20 mg bid. His home BP has been running 140/78 ,  138/76. He's not found readings at 185/85 since his medication adjustment     Subjective:   He denies significant chest pain. There is no SOB/GUERRIER. The patient denies orthopnea/PND. The patient does not have swelling. The patients weight is stable . The patient is not experiencing palpitations or dizziness. These symptoms show no change since the last OV. With regard to medication therapy the patient has been compliant with prescribed regimen. They have tolerated therapy to date.      Past Medical History:   Diagnosis Date    ASHD (arteriosclerotic heart disease)     Basal cell carcinoma Jan., 2004 & Feb., 2007    resection of multiple , Ca lip    Bell's palsy Nov., 2003    Cardiac catheterization as cause of abnormal reaction of patient, or of later complication, without mention of misadventure at time of procedure Dec., 2011    Dr. Fili Keene - R-30%,Circ-nl,LAD-75%,diag-95%,EF-60%    Diverticulosis     Gastric AVM     GERD (gastroesophageal reflux disease)     HTN (hypertension)     Hyperlipidemia     Hypothyroidism 2003    Melanoma [facility]) *Aug., 2016    Dr. Haroon Meneses NIDDM (non-insulin dependent diabetes mellitus) 2005    Spinal stenosis *Febr., 2017    Severe at C3-C4 with severe cord compression and myelopathy     Social History:    Social History     Tobacco Use   Smoking Status Never Smoker   Smokeless Tobacco Never Used     Current Medications:  Current Outpatient Medications   Medication Sig Dispense Refill    Lancets MISC 1 each by Does not apply route 2 times daily 100 each 2    blood glucose test strips (ONETOUCH ULTRA) strip TEST ONCE DAILY AND AS     NEEDED FOR SYMPTOMS OF     IRREGULAR BLOOD GLUCOSE 200 strip 0    lisinopril (PRINIVIL;ZESTRIL) 20 MG tablet Take 1 tablet by mouth 2 times daily 180 tablet 1    esomeprazole (NEXIUM) 20 MG delayed release capsule Take 20 mg by mouth as needed       atorvastatin (LIPITOR) 20 MG tablet TAKE ONE TABLET BY MOUTH ONCE DAILY 90 tablet 3    levothyroxine (SYNTHROID) 88 MCG tablet TAKE ONE TABLET BY MOUTH ONCE DAILY 90 tablet 3    allopurinol (ZYLOPRIM) 100 MG tablet TAKE ONE & ONE-HALF TABLETS BY MOUTH ONCE DAILY (Patient taking differently: 50 mg daily ) 135 tablet 3    glipiZIDE-metformin (METAGLIP) 5-500 MG per tablet Take 1 tablet by mouth 2 times daily (before meals) 180 tablet 3    blood glucose test strips (ONE TOUCH ULTRA TEST) strip USE AS DIRECTED TWICE DAILY 200 strip 3    ONE TOUCH LANCETS MISC 1 each by Does not apply route 2 times daily 200 each 3    famotidine (PEPCID) 10 MG tablet Take 10 mg by mouth nightly as needed       Multiple Vitamins-Minerals (CENTRUM SILVER PO) Take  by mouth. No current facility-administered medications for this visit. REVIEW OF SYSTEMS:    CONSTITUTIONAL: No major weight gain or loss, fatigue, weakness, night sweats or fever. HEENT: No new vision difficulties or ringing in the ears. RESPIRATORY: No new SOB, PND, orthopnea or cough. CARDIOVASCULAR: See HPI  GI: No nausea, vomiting, diarrhea, constipation, abdominal pain or changes in bowel habits. : No urinary frequency, urgency, incontinence hematuria or dysuria. SKIN: No cyanosis or skin lesions. MUSCULOSKELETAL: No new muscle or joint pain. NEUROLOGICAL: No syncope or TIA-like symptoms.   PSYCHIATRIC: No anxiety, pain, insomnia or depression ; wife      Objective:   PHYSICAL EXAM:       Vitals:    21 1416 21 1448 21 1449 21 1450   BP: 102/60 124/60 134/62 130/72   Site: Right Upper Arm Right Upper Arm Left Upper Arm Left Upper Arm   Position: Sitting      Cuff Size: Medium

## 2021-07-01 ENCOUNTER — OFFICE VISIT (OUTPATIENT)
Dept: FAMILY MEDICINE CLINIC | Age: 81
End: 2021-07-01
Payer: MEDICARE

## 2021-07-01 VITALS
SYSTOLIC BLOOD PRESSURE: 120 MMHG | DIASTOLIC BLOOD PRESSURE: 76 MMHG | WEIGHT: 169 LBS | BODY MASS INDEX: 26.53 KG/M2 | HEART RATE: 70 BPM | OXYGEN SATURATION: 97 % | HEIGHT: 67 IN

## 2021-07-01 DIAGNOSIS — N52.9 ERECTILE DYSFUNCTION, UNSPECIFIED ERECTILE DYSFUNCTION TYPE: Primary | ICD-10-CM

## 2021-07-01 PROCEDURE — 99213 OFFICE O/P EST LOW 20 MIN: CPT | Performed by: FAMILY MEDICINE

## 2021-07-01 RX ORDER — SILDENAFIL 100 MG/1
100 TABLET, FILM COATED ORAL PRN
Qty: 10 TABLET | Refills: 5 | Status: SHIPPED | OUTPATIENT
Start: 2021-07-01 | End: 2022-02-07 | Stop reason: SDUPTHER

## 2021-07-01 SDOH — ECONOMIC STABILITY: FOOD INSECURITY: WITHIN THE PAST 12 MONTHS, THE FOOD YOU BOUGHT JUST DIDN'T LAST AND YOU DIDN'T HAVE MONEY TO GET MORE.: NEVER TRUE

## 2021-07-01 SDOH — ECONOMIC STABILITY: FOOD INSECURITY: WITHIN THE PAST 12 MONTHS, YOU WORRIED THAT YOUR FOOD WOULD RUN OUT BEFORE YOU GOT MONEY TO BUY MORE.: NEVER TRUE

## 2021-07-01 ASSESSMENT — PATIENT HEALTH QUESTIONNAIRE - PHQ9
SUM OF ALL RESPONSES TO PHQ QUESTIONS 1-9: 0
SUM OF ALL RESPONSES TO PHQ QUESTIONS 1-9: 0
SUM OF ALL RESPONSES TO PHQ9 QUESTIONS 1 & 2: 0
1. LITTLE INTEREST OR PLEASURE IN DOING THINGS: 0
2. FEELING DOWN, DEPRESSED OR HOPELESS: 0
SUM OF ALL RESPONSES TO PHQ QUESTIONS 1-9: 0

## 2021-07-01 ASSESSMENT — SOCIAL DETERMINANTS OF HEALTH (SDOH): HOW HARD IS IT FOR YOU TO PAY FOR THE VERY BASICS LIKE FOOD, HOUSING, MEDICAL CARE, AND HEATING?: NOT HARD AT ALL

## 2021-07-01 NOTE — PROGRESS NOTES
[address] (:  1940) is a 79 y.o. male,Established patient, here for evaluation of the following chief complaint(s):  Discuss Medications (Patient would like to discuss starting Viagra.)         ASSESSMENT/PLAN:  Jayde Leblanc was seen today for discuss medications. Diagnoses and all orders for this visit:    Erectile dysfunction, unspecified erectile dysfunction type    Other orders  -     sildenafil (VIAGRA) 100 MG tablet; Take 1 tablet by mouth as needed for Erectile Dysfunction       Not well controlled  Trial of viagra  Medication side affects and adverse reactions reviewed. contraindication with nitratess reviewed. Currently pt does not have this to take but may come up in the future  No follow-ups on file. Subjective   SUBJECTIVE/OBJECTIVE:  HPI   Pt is a of 79 y.o. male comes in today with   Chief Complaint   Patient presents with    Discuss Medications     Patient would like to discuss starting Viagra. Vitals:    21 1051   BP: 120/76   Site: Right Upper Arm   Position: Sitting   Cuff Size: Small Adult   Pulse: 70   SpO2: 97%   Weight: 169 lb (76.7 kg)   Height: 5' 7.2\" (1.707 m)        Review of Systems       Objective   Physical Exam         An electronic signature was used to authenticate this note.     --Valente Huang MD

## 2021-08-02 ENCOUNTER — OFFICE VISIT (OUTPATIENT)
Dept: FAMILY MEDICINE CLINIC | Age: 81
End: 2021-08-02
Payer: MEDICARE

## 2021-08-02 VITALS
OXYGEN SATURATION: 97 % | WEIGHT: 171.6 LBS | SYSTOLIC BLOOD PRESSURE: 142 MMHG | BODY MASS INDEX: 26.93 KG/M2 | DIASTOLIC BLOOD PRESSURE: 70 MMHG | HEIGHT: 67 IN | HEART RATE: 68 BPM

## 2021-08-02 DIAGNOSIS — I10 ESSENTIAL HYPERTENSION: ICD-10-CM

## 2021-08-02 DIAGNOSIS — E11.9 TYPE 2 DIABETES MELLITUS WITHOUT COMPLICATION, WITHOUT LONG-TERM CURRENT USE OF INSULIN (HCC): Primary | ICD-10-CM

## 2021-08-02 LAB — HBA1C MFR BLD: 6.2 %

## 2021-08-02 PROCEDURE — 83036 HEMOGLOBIN GLYCOSYLATED A1C: CPT | Performed by: FAMILY MEDICINE

## 2021-08-02 PROCEDURE — 99213 OFFICE O/P EST LOW 20 MIN: CPT | Performed by: FAMILY MEDICINE

## 2021-08-02 ASSESSMENT — PATIENT HEALTH QUESTIONNAIRE - PHQ9
2. FEELING DOWN, DEPRESSED OR HOPELESS: 0
1. LITTLE INTEREST OR PLEASURE IN DOING THINGS: 0
SUM OF ALL RESPONSES TO PHQ QUESTIONS 1-9: 0
SUM OF ALL RESPONSES TO PHQ QUESTIONS 1-9: 0
SUM OF ALL RESPONSES TO PHQ9 QUESTIONS 1 & 2: 0
SUM OF ALL RESPONSES TO PHQ QUESTIONS 1-9: 0

## 2021-08-02 NOTE — PROGRESS NOTES
[address] [facility] (:  1940) is a 79 y.o. male,Established patient, here for evaluation of the following chief complaint(s):  6 Month Follow-Up (Patient is not fasting, here for 6 month f/u.)         ASSESSMENT/PLAN:  Kimberly Jaimes was seen today for 6 month follow-up. Diagnoses and all orders for this visit:    Type 2 diabetes mellitus without complication, without long-term current use of insulin (HCC)  -     POCT glycosylated hemoglobin (Hb A1C)  -     Lipid Panel; Future  -     Basic Metabolic Panel; Future  -     Microalbumin / Creatinine Urine Ratio; Future  well controlled on current meds  Essential hypertension  The current medical regimen is effective;  continue present plan and medications. No follow-ups on file. Subjective   SUBJECTIVE/OBJECTIVE:  HPI   Pt is a of 79 y.o. male comes in today with   Chief Complaint   Patient presents with    6 Month Follow-Up     Patient is not fasting, here for 6 month f/u.     follow up dm.  blood pressure meds as prescribed  Lipids have been good on statin. Walks regularly. Vitals:    21 0753   BP: (!) 142/70   Site: Left Upper Arm   Position: Sitting   Cuff Size: Medium Adult   Pulse: 68   SpO2: 97%   Weight: 171 lb 9.6 oz (77.8 kg)   Height: 5' 7.2\" (1.707 m)        Review of Systems       Objective   Physical Exam         An electronic signature was used to authenticate this note.     --Bushra Erickson MD

## 2021-09-21 ENCOUNTER — TELEPHONE (OUTPATIENT)
Dept: FAMILY MEDICINE CLINIC | Age: 81
End: 2021-09-21

## 2021-09-21 RX ORDER — BENZONATATE 200 MG/1
200 CAPSULE ORAL 3 TIMES DAILY PRN
Qty: 30 CAPSULE | Refills: 0 | Status: SHIPPED | OUTPATIENT
Start: 2021-09-21 | End: 2021-12-08

## 2021-09-21 NOTE — TELEPHONE ENCOUNTER
----- Message from Tray Rios sent at 9/21/2021  7:57 AM EDT -----  Subject: Message to Provider    QUESTIONS  Information for Provider? Patient states he has a sinus infection, drainage. no fever. did cough all night long. He use to get a Z-rosales from his former Dr. Laila Ny. Ayla Vail do the same. [facility] [address]   ---------------------------------------------------------------------------  --------------  Finis Lady INFO  What is the best way for the office to contact you? OK to leave message on   voicemail  Preferred Call Back Phone Number? [phone]  ---------------------------------------------------------------------------  --------------  SCRIPT ANSWERS  Relationship to Patient?  Self

## 2021-09-21 NOTE — TELEPHONE ENCOUNTER
Virus going around. Z pack won't help if he's just been sick for a few days.   Needs amadeo't to determine appropriateness of antibiotics

## 2021-12-03 RX ORDER — BLOOD SUGAR DIAGNOSTIC
STRIP MISCELLANEOUS
Qty: 200 STRIP | Refills: 0 | Status: SHIPPED | OUTPATIENT
Start: 2021-12-03 | End: 2022-02-21

## 2021-12-08 ENCOUNTER — OFFICE VISIT (OUTPATIENT)
Dept: CARDIOLOGY CLINIC | Age: 81
End: 2021-12-08
Payer: MEDICARE

## 2021-12-08 VITALS
HEART RATE: 100 BPM | DIASTOLIC BLOOD PRESSURE: 72 MMHG | WEIGHT: 173.7 LBS | BODY MASS INDEX: 27.26 KG/M2 | HEIGHT: 67 IN | OXYGEN SATURATION: 98 % | SYSTOLIC BLOOD PRESSURE: 138 MMHG

## 2021-12-08 DIAGNOSIS — E78.2 MIXED HYPERLIPIDEMIA: ICD-10-CM

## 2021-12-08 DIAGNOSIS — I25.10 CORONARY ARTERY DISEASE INVOLVING NATIVE CORONARY ARTERY OF NATIVE HEART WITHOUT ANGINA PECTORIS: Primary | ICD-10-CM

## 2021-12-08 DIAGNOSIS — I10 ESSENTIAL HYPERTENSION: ICD-10-CM

## 2021-12-08 PROCEDURE — 99214 OFFICE O/P EST MOD 30 MIN: CPT | Performed by: NURSE PRACTITIONER

## 2021-12-08 RX ORDER — LISINOPRIL 20 MG/1
20 TABLET ORAL 2 TIMES DAILY
Qty: 180 TABLET | Refills: 2 | Status: SHIPPED | OUTPATIENT
Start: 2021-12-08 | End: 2022-08-22

## 2021-12-08 RX ORDER — ALLOPURINOL 100 MG/1
50 TABLET ORAL DAILY
COMMUNITY
End: 2022-02-21

## 2021-12-08 NOTE — PROGRESS NOTES
[facility]     Outpatient Follow Up Note    Thurmond Jeans is [de-identified] y.o. male who presents today with a history of non-obst CAD, HTN, hyperlipidemia and dizziness (neg Holter)      CHIEF COMPLAINT / HPI:  Follow Up secondary to CAD    Subjective:   He denies significant chest pain. There is no SOB/GUERRIER. The patient denies orthopnea/PND. The patient does not have swelling. The patients weight is stable . The patient is not experiencing palpitations or dizziness. His home BP has been good and usually < 140/80    These symptoms show no change since the last OV. With regard to medication therapy the patient has been compliant with prescribed regimen. They have tolerated therapy to date.      Past Medical History:   Diagnosis Date    ASHD (arteriosclerotic heart disease)     Basal cell carcinoma Jan., 2004 & Feb., 2007    resection of multiple , Ca lip    Bell's palsy Nov., 2003    Cardiac catheterization as cause of abnormal reaction of patient, or of later complication, without mention of misadventure at time of procedure Dec., 2011    Dr. Sophia Wallace - R-30%,Circ-nl,LAD-75%,diag-95%,EF-60%    Diverticulosis     Gastric AVM     GERD (gastroesophageal reflux disease)     HTN (hypertension)     Hyperlipidemia     Hypothyroidism 2003    Melanoma [facility]) *Aug., 2016    Dr. Eusebio Patiño NIDDM (non-insulin dependent diabetes mellitus) 2005    Spinal stenosis *Febr., 2017    Severe at C3-C4 with severe cord compression and myelopathy     Social History:    Social History     Tobacco Use   Smoking Status Never Smoker   Smokeless Tobacco Never Used     Current Medications:  Current Outpatient Medications   Medication Sig Dispense Refill    lisinopril (PRINIVIL;ZESTRIL) 20 MG tablet Take 1 tablet by mouth 2 times daily 180 tablet 2    allopurinol (ZYLOPRIM) 100 MG tablet Take 50 mg by mouth daily      Esomeprazole Magnesium (NEXIUM PO) Take 20 mg by mouth once a week      atorvastatin (LIPITOR) 20 MG tablet TAKE ONE TABLET BY MOUTH ONCE DAILY 90 tablet 3    levothyroxine (SYNTHROID) 88 MCG tablet TAKE ONE TABLET BY MOUTH ONCE DAILY 90 tablet 3    glipiZIDE-metformin (METAGLIP) 5-500 MG per tablet Take 1 tablet by mouth 2 times daily (before meals) 180 tablet 3    famotidine (PEPCID) 10 MG tablet Take 10 mg by mouth every morning (before breakfast)       Multiple Vitamins-Minerals (CENTRUM SILVER PO) Take  by mouth.  sildenafil (VIAGRA) 100 MG tablet Take 1 tablet by mouth as needed for Erectile Dysfunction (Patient not taking: Reported on 2021) 10 tablet 5     REVIEW OF SYSTEMS:    CONSTITUTIONAL: No major weight gain or loss, fatigue, weakness, night sweats or fever. HEENT: No new vision difficulties or ringing in the ears. RESPIRATORY: No new SOB, PND, orthopnea or cough. CARDIOVASCULAR: See HPI  GI: No nausea, vomiting, diarrhea, constipation, abdominal pain or changes in bowel habits. : No urinary frequency, urgency, incontinence hematuria or dysuria. SKIN: No cyanosis or skin lesions. MUSCULOSKELETAL: No new muscle or joint pain. NEUROLOGICAL: No syncope or TIA-like symptoms. PSYCHIATRIC: No anxiety, pain, insomnia or depression ; wife      Objective:   PHYSICAL EXAM:       Vitals:    21 0905 21 0916   BP: (!) 140/80 138/72   Site: Right Upper Arm Right Upper Arm   Position: Sitting    Cuff Size: Medium Adult Medium Adult   Pulse: 100    SpO2: 98%    Weight: 173 lb 11.2 oz (78.8 kg)    Height: 5' 7.2\" (1.707 m)         VITALS:  BP (!) 140/80 (Site: Right Upper Arm, Position: Sitting, Cuff Size: Medium Adult)   Pulse 100   Ht 5' 7.2\" (1.707 m)   Wt 173 lb 11.2 oz (78.8 kg)   SpO2 98%   BMI 27.04 kg/m²   CONSTITUTIONAL: Cooperative, no apparent distress, and appears well nourished / developed  NEUROLOGIC:  Awake and orientated to person, place and time. PSYCH: Calm affect. SKIN: Warm and dry.   HEENT: Sclera non-icteric, normocephalic, neck supple, no elevation of JVP, normal carotid pulses with no bruits and thyroid normal size. LUNGS:  No increased work of breathing and clear to auscultation, no crackles or wheezing  CARDIOVASCULAR:  Regular rate 96 and rhythm with no murmurs, gallops, rubs, or abnormal heart sounds, normal PMI. The apical impulses not displaced  JVP less than 8 cm H2O  Heart tones are crisp and normal  Cervical veins are not engorged  The carotid upstroke is normal in amplitude and contour without delay or bruit  JVP is not elevated  ABDOMEN:  Normal bowel sounds, non-distended and non-tender to palpation  EXT: No edema, no calf tenderness. Pulses are present bilaterally. DATA:    Lab Results   Component Value Date    ALT 23 02/03/2021    AST 26 02/03/2021    ALKPHOS 61 02/03/2021    BILITOT 0.9 02/03/2021     Lab Results   Component Value Date    CREATININE 1.2 08/09/2021    BUN 20 08/09/2021     08/09/2021    K 4.5 08/09/2021     08/09/2021    CO2 22 08/09/2021     Lab Results   Component Value Date    TSH 1.94 02/03/2021     Lab Results   Component Value Date    TRIG 83 08/09/2021    TRIG 94 02/03/2021    TRIG 180 (H) 08/05/2020     Lab Results   Component Value Date    HDL 40 08/09/2021    HDL 29 (L) 02/03/2021    HDL 30 (L) 08/05/2020     Lab Results   Component Value Date    LDLCALC 103 (H) 08/09/2021    LDLCALC 62 02/03/2021    LDLCALC 77 08/05/2020     Lab Results   Component Value Date    LABVLDL 17 08/09/2021    LABVLDL 19 02/03/2021    LABVLDL 36 08/05/2020     Radiology Review:  Pertinent images / reports were reviewed as a part of this visit and reveals the following:    Echo: 3/31/21:  Summary   Normal left ventricle size, wall thickness and systolic function with an   estimated ejection fraction of 50%. No regional wall motion abnormalities   are seen. E/e\"= 12.3 . Grade I diastolic dysfunction with normal LV filling pressures. Normal function of all valves.     Stress Test: 3/31/21  Summary    The overall quality of the study is fair. There is subdiaphragmatic    attenuation.        Left ventricular cavity is mildly dilated at 128 ml. The right ventricle is    normal in size.        There is a small size perfusion defect of mild intensity in the apex, apical    inferior, and mid inferior regions. The defect is present at rest and    stress, consistent with artifact or scar. There is no reversibility.    Gated spect reveals normal myocardial thickening and wall motion.        Sum stress score of 7. There is no visual TID. Calculated tid is 1.01.        Myocardial perfusion is abnormal with fixed inferior defect. In comparison    to previous study, there is no change. Low-to-moderate risk scan        Angiogram: 12/2011: - LAD 75% with flow wire 0.87, D-2 95%, RCA prox 30%, treated medically      Assessment:        Diagnosis Orders     1. Coronary artery disease involving native coronary artery of native heart without angina pectoris   ~stable : denies angina  ~exercises routinely  ~non-obst disease by cath '11  ~fixed defect by NM March '21  ~is not taking ASA as recommended      2. Essential hypertension   ~controlled on current regimen         3. Mixed hyperlipidemia   ~LDL no longer at goal ; had been taking only 10 mg daily (self adjusted)        I had the opportunity to review the clinical symptoms and presentation of Gabrielle Quintero. Plan:     1. Resume atorvastatin 20 mg daily (labs planned for PCP Feb '22)   ~plan to recheck lipid profile ~ 2 months  2. F/U in 3 months as scheduled    Overall the patient is stable from CV standpoint    I have addresed the patient's cardiac risk factors and adjusted pharmacologic treatment as needed. In addition, I have reinforced the need for patient directed risk factor modification. Further evaluation will be based upon the patient's clinical course and testing results. All questions and concerns were addressed to the patient.  Alternatives to my treatment were discussed. The patient is not currently smoking. The risks related to smoking were reviewed with the patient. Recommend maintaining a smoke-free lifestyle. Patient is not on a beta-blocker : bradycardia  Patient is on an ace-i  Patient is on a statin    Antiplatelet therapy has been recommended / prescribed for this patient. Education conducted on adverse reactions including bleeding was discussed. The patient verbalizes understanding not to stop medications without discussing with us. Discussed exercise: 30-60 minutes 7 days/week. Walks 1 hr every morning ; gym 1 hr for an exercise class (10-12,000 steps / day)  Discussed Low saturated fat/INNA diet. SMBG 110 yesterday    Thank you for allowing to us to participate in the care of [address] [place] [address].     RICARDO Sarabia    Documentation of today's visit sent to PCP

## 2021-12-30 RX ORDER — LANCETS 33 GAUGE
EACH MISCELLANEOUS
Qty: 100 EACH | Refills: 2 | Status: SHIPPED | OUTPATIENT
Start: 2021-12-30 | End: 2022-05-09

## 2022-02-07 ENCOUNTER — OFFICE VISIT (OUTPATIENT)
Dept: FAMILY MEDICINE CLINIC | Age: 82
End: 2022-02-07
Payer: MEDICARE

## 2022-02-07 VITALS
OXYGEN SATURATION: 98 % | DIASTOLIC BLOOD PRESSURE: 72 MMHG | BODY MASS INDEX: 27.31 KG/M2 | HEART RATE: 104 BPM | HEIGHT: 67 IN | SYSTOLIC BLOOD PRESSURE: 138 MMHG | WEIGHT: 174 LBS

## 2022-02-07 DIAGNOSIS — Z00.00 ROUTINE GENERAL MEDICAL EXAMINATION AT A HEALTH CARE FACILITY: Primary | ICD-10-CM

## 2022-02-07 DIAGNOSIS — E11.9 TYPE 2 DIABETES MELLITUS WITHOUT COMPLICATION, WITHOUT LONG-TERM CURRENT USE OF INSULIN (HCC): ICD-10-CM

## 2022-02-07 DIAGNOSIS — E03.9 HYPOTHYROIDISM, UNSPECIFIED TYPE: ICD-10-CM

## 2022-02-07 DIAGNOSIS — Z12.5 SCREENING PSA (PROSTATE SPECIFIC ANTIGEN): ICD-10-CM

## 2022-02-07 DIAGNOSIS — M1A.09X0 IDIOPATHIC CHRONIC GOUT OF MULTIPLE SITES WITHOUT TOPHUS: ICD-10-CM

## 2022-02-07 DIAGNOSIS — I10 ESSENTIAL HYPERTENSION: ICD-10-CM

## 2022-02-07 PROCEDURE — G0439 PPPS, SUBSEQ VISIT: HCPCS | Performed by: FAMILY MEDICINE

## 2022-02-07 RX ORDER — SILDENAFIL 100 MG/1
100 TABLET, FILM COATED ORAL PRN
Qty: 10 TABLET | Refills: 5 | Status: SHIPPED | OUTPATIENT
Start: 2022-02-07 | End: 2022-02-11 | Stop reason: SDUPTHER

## 2022-02-07 RX ORDER — GLIPIZIDE AND METFORMIN HCL 5; 500 MG/1; MG/1
1 TABLET, FILM COATED ORAL
Qty: 180 TABLET | Refills: 3 | Status: SHIPPED | OUTPATIENT
Start: 2022-02-07 | End: 2022-02-08

## 2022-02-07 RX ORDER — LEVOTHYROXINE SODIUM 88 UG/1
TABLET ORAL
Qty: 90 TABLET | Refills: 3 | Status: SHIPPED | OUTPATIENT
Start: 2022-02-07 | End: 2022-02-08

## 2022-02-07 RX ORDER — ATORVASTATIN CALCIUM 20 MG/1
TABLET, FILM COATED ORAL
Qty: 90 TABLET | Refills: 3 | Status: SHIPPED | OUTPATIENT
Start: 2022-02-07 | End: 2022-02-08

## 2022-02-07 ASSESSMENT — PATIENT HEALTH QUESTIONNAIRE - PHQ9
1. LITTLE INTEREST OR PLEASURE IN DOING THINGS: 0
SUM OF ALL RESPONSES TO PHQ9 QUESTIONS 1 & 2: 0
SUM OF ALL RESPONSES TO PHQ QUESTIONS 1-9: 0
2. FEELING DOWN, DEPRESSED OR HOPELESS: 0
SUM OF ALL RESPONSES TO PHQ QUESTIONS 1-9: 0

## 2022-02-07 ASSESSMENT — LIFESTYLE VARIABLES: HOW OFTEN DO YOU HAVE A DRINK CONTAINING ALCOHOL: 0

## 2022-02-07 NOTE — PROGRESS NOTES
Medicare Annual Wellness Visit  Name: Julián Holden Date: 2022   MRN: [id number] Sex: Male   Age: [de-identified] y.o. Ethnicity: Non- / Non    : 1940 Race: White (non-)      Chris Corrigan is here for Medicare AWV    Screenings for behavioral, psychosocial and functional/safety risks, and cognitive dysfunction are all negative except as indicated below. These results, as well as other patient data from the [address] form, are documented in Flowsheets linked to this Encounter. No Known Allergies    Prior to Visit Medications    Medication Sig Taking? Authorizing Provider   Lancets (Danielle Cohen PLUS [id number]) [address] USE AND DISCARD 1 LANCET 2 TIMES DAILY Yes Pietro Marin MD   lisinopril (PRINIVIL;ZESTRIL) 20 MG tablet Take 1 tablet by mouth 2 times daily Yes RICARDO Faustin - CNP   allopurinol (ZYLOPRIM) 100 MG tablet Take 50 mg by mouth daily Yes Historical Provider, MD   Esomeprazole Magnesium (NEXIUM PO) Take 20 mg by mouth once a week Yes Historical Provider, MD Miguelangel Mike strip TEST ONCE DAILY AND AS     NEEDED FOR SYMPTOMS OF     IRREGULAR BLOOD GLUCOSE Yes Pietro Marin MD   atorvastatin (LIPITOR) 20 MG tablet TAKE ONE TABLET BY MOUTH ONCE DAILY Yes Pietro Marin MD   levothyroxine (SYNTHROID) 88 MCG tablet TAKE ONE TABLET BY MOUTH ONCE DAILY Yes Pietro Marin MD   glipiZIDE-metformin (METAGLIP) 5-500 MG per tablet Take 1 tablet by mouth 2 times daily (before meals) Yes Pietro Marin MD   blood glucose test strips (ONE TOUCH ULTRA TEST) strip USE AS DIRECTED TWICE DAILY Yes Nereida Colón MD   ONE TOUCH LANCETS MISC 1 each by Does not apply route 2 times daily Yes Nereida Colón MD   famotidine (PEPCID) 10 MG tablet Take 10 mg by mouth every morning (before breakfast)  Yes Historical Provider, MD   Multiple Vitamins-Minerals (CENTRUM SILVER PO) Take  by mouth.  Yes Historical Provider, MD   sildenafil (VIAGRA) 100 MG tablet Take 1 tablet by mouth as needed for Erectile Dysfunction  Patient not taking: Reported on 2021  Todd Mac MD       Past Medical History:   Diagnosis Date    ASHD (arteriosclerotic heart disease)     Basal cell carcinoma 2004 & 2007    resection of multiple , Ca lip    Bell's palsy 2003    Cardiac catheterization as cause of abnormal reaction of patient, or of later complication, without mention of misadventure at time of procedure Dec., 2011    Dr. Pacheco Curry - R-30%,Circ-nl,LAD-75%,diag-95%,EF-60%    Diverticulosis     Gastric AVM     GERD (gastroesophageal reflux disease)     HTN (hypertension)     Hyperlipidemia     Hypothyroidism     Melanoma [facility]) *Aug., 2016    Dr. Maria Alejandra Richards NIDDM (non-insulin dependent diabetes mellitus)     Spinal stenosis *2017    Severe at C3-C4 with severe cord compression and myelopathy       Past Surgical History:   Procedure Laterality Date   [address]    normal, LAD:40%, 50%    CARDIAC CATHETERIZATION  Dec., 2011    Dr. Pacheco Curry - LAD-75%,duag-95%, R-Normal,Circ-normal    CERVICAL [facility]GERY  *May, 2017    Dr. Nilton Conley - ACDF - C4-C5    COLONOSCOPY  2007 (  )     Dr. Lilian Foster - diverticulosis, hemorrhoids    COLONOSCOPY  Dec. 18, 2012 ( 2017 )     Dr. Dianne Up - sigmoid diverticulosis    COLONOSCOPY  *Dec.5, 2017 ( prn )    Dr. Dianne Up - diverticulosis   Irl Talbert      Dr. Elsa Gusman - rubber banding    HERNIA REPAIR      Right inguinal     INTRACAPSULAR CATARACT EXTRACTION  *    Dr. Monty Wang - bilateral    UPPER GASTROINTESTINAL ENDOSCOPY      AV malformation, Booth's       Family History   Problem Relation Age of Onset    Other Father 80         ASHD    Other Mother 80         - dementia    Heart Disease Neg Hx     High Blood Pressure Neg Hx     High Cholesterol Neg Hx        CareTeam (Including outside providers/suppliers regularly involved in providing care):   Patient Care Team:  Patricia Enrique MD as PCP - General (Family Medicine)  Patricia Enrique MD as PCP [facility]  Paco Renteria MD as Consulting Physician (Gastroenterology)  Elias Mcmanus as Consulting Physician (Ophthalmology)  Lupe Tucker as Consulting Physician (Rheumatology)  Gabby Guzman MD as Consulting Physician (Cardiology)  Liz Rueda as Consulting Physician (Hematology and Oncology)    [facility] Readings from Last 3 Encounters:   02/07/22 174 lb (78.9 kg)   12/08/21 173 lb 11.2 oz (78.8 kg)   08/02/21 171 lb 9.6 oz (77.8 kg)     Vitals:    02/07/22 0852   BP: 138/72   Site: Left Upper Arm   Position: Sitting   Cuff Size: Small Adult   Pulse: 104   SpO2: 98%   Weight: 174 lb (78.9 kg)   Height: 5' 7.2\" (1.707 m)     Body mass index is 27.09 kg/m². Based upon direct observation of the patient, evaluation of cognition reveals recent and remote memory intact.     General Appearance: alert and oriented to person, place and time, well developed and well- nourished, in no acute distress  Skin: warm and dry, no rash or erythema  Head: normocephalic and atraumatic  Eyes: pupils equal, round, and reactive to light, extraocular eye movements intact, conjunctivae normal  ENT: tympanic membrane, external ear and ear canal normal bilaterally, nose without deformity, nasal mucosa and turbinates normal without polyps  Neck: supple and non-tender without mass, no thyromegaly or thyroid nodules, no cervical lymphadenopathy  Pulmonary/Chest: clear to auscultation bilaterally- no wheezes, rales or rhonchi, normal air movement, no respiratory distress  Cardiovascular: normal rate, regular rhythm, normal S1 and S2, no murmurs, rubs, clicks, or gallops, distal pulses intact, no carotid bruits  Extremities: no cyanosis, clubbing or edema  Musculoskeletal: normal range of motion, no joint swelling, deformity or tenderness  Neurologic: reflexes normal and symmetric, no cranial nerve deficit, gait, coordination and speech normal    Patient's complete Health Risk Assessment and screening values have been reviewed and are found in Flowsheets. The following problems were reviewed today and where indicated follow up appointments were made and/or referrals ordered. Positive Risk Factor Screenings with Interventions:                 No Positive Risk Factors identified today. Personalized Preventive Plan   Current Health Maintenance Status  Immunization History   Administered Date(s) Administered    COVID-19, Katerina Payne, Primary or Immunocompromised, PF, 100mcg/0.5mL 01/30/2021, 02/27/2021, 01/28/2022    Influenza Vaccine, unspecified formulation 12/02/2016    Influenza Virus Vaccine 12/30/2012, 11/18/2014    Influenza, High Dose (Fluzone 65 yrs and older) 11/17/2017, 10/01/2018, 11/29/2019, 11/16/2021    Influenza, High-dose, Quadv, 65 yrs +, IM (Fluzone) 10/21/2020    Pneumococcal Conjugate 13-valent ([id number]) 02/12/2016    Pneumococcal Polysaccharide ([id number]) 03/15/2005, 02/12/2013, 06/26/2017    Td, unspecified formulation 05/09/2011    Zoster Live (Zostavax) 02/23/2013    Zoster Recombinant (Shingrix) 09/25/2018, 01/01/2019, 01/15/2019        Health Maintenance   Topic Date Due    DTaP/Tdap/Td vaccine (1 - Tdap) 05/10/2011    Annual Wellness Visit (AWV)  08/11/2021    TSH testing  02/03/2022    PSA counseling  02/11/2022    Depression Screen  08/02/2022    Lipid screen  08/09/2022    Potassium monitoring  08/09/2022    Creatinine monitoring  08/09/2022    Colon cancer screen colonoscopy  09/25/2025    Flu vaccine  Completed    Shingles Vaccine  Completed    Pneumococcal 65+ years Vaccine  Completed    COVID-19 Vaccine  Completed    Hepatitis A vaccine  Aged Out    Hib vaccine  Aged Out    Meningococcal (ACWY) vaccine  Aged Out     Recommendations for [employer] Due: see orders and patient instructions/AVS.  .   Recommended screening schedule for the next 5-10 years is provided to the patient in written form: see Patient Instructions/AVS.    Torrey Holbrook was seen today for medicare aw. Diagnoses and all orders for this visit:    Routine general medical examination at a health care facility    Type 2 diabetes mellitus without complication, without long-term current use of insulin ([facility]  -     Hemoglobin A1C; Future  -     Lipid Panel; Future  -     Comprehensive Metabolic Panel; Future  The current medical regimen is effective;  continue present plan and medications. Hypothyroidism, unspecified type  -     TSH without Reflex; Future  Recheck to see if stable on levo  Essential hypertension  The current medical regimen is effective;  continue present plan and medications. Screening PSA (prostate specific antigen)  -     PSA screening; Future    Idiopathic chronic gout of multiple sites without tophus  -     URIC ACID; Future  No flares since being on allopurinol  Only on 1/2 100 mg  Other orders  -     levothyroxine (SYNTHROID) 88 MCG tablet; TAKE ONE TABLET BY MOUTH ONCE DAILY  -     glipiZIDE-metFORMIN (METAGLIP) 5-500 MG per tablet; Take 1 tablet by mouth 2 times daily (before meals)  -     sildenafil (VIAGRA) 100 MG tablet;  Take 1 tablet by mouth as needed for Erectile Dysfunction  -     atorvastatin (LIPITOR) 20 MG tablet; TAKE ONE TABLET BY MOUTH ONCE DAILY

## 2022-02-07 NOTE — PATIENT INSTRUCTIONS
Personalized Preventive Plan for Marycarmen Chavarria - 2/7/2022  Medicare offers a range of preventive health benefits. Some of the tests and screenings are paid in full while other may be subject to a deductible, co-insurance, and/or copay. Some of these benefits include a comprehensive review of your medical history including lifestyle, illnesses that may run in your family, and various assessments and screenings as appropriate. After reviewing your medical record and screening and assessments performed today your provider may have ordered immunizations, labs, imaging, and/or referrals for you. A list of these orders (if applicable) as well as your Preventive Care list are included within your After Visit Summary for your review. Other Preventive Recommendations:    · A preventive eye exam performed by an eye specialist is recommended every 1-2 years to screen for glaucoma; cataracts, macular degeneration, and other eye disorders. · A preventive dental visit is recommended every 6 months. · Try to get at least 150 minutes of exercise per week or 10,000 steps per day on a pedometer . · Order or download the FREE \"Exercise & Physical Activity: Your Everyday Guide\" from [employer]. Call 1-959.399.7671 or search [employer] online. · You need 2110-1884 mg of calcium and 8550-7251 IU of vitamin D per day. It is possible to meet your calcium requirement with diet alone, but a vitamin D supplement is usually necessary to meet this goal.  · When exposed to the sun, use a sunscreen that protects against both UVA and UVB radiation with an SPF of 30 or greater. Reapply every 2 to 3 hours or after sweating, drying off with a towel, or swimming. · Always wear a seat belt when traveling in a car. Always wear a helmet when riding a bicycle or motorcycle.

## 2022-02-08 RX ORDER — LEVOTHYROXINE SODIUM 88 UG/1
TABLET ORAL
Qty: 90 TABLET | Refills: 3 | Status: SHIPPED | OUTPATIENT
Start: 2022-02-08

## 2022-02-08 RX ORDER — ATORVASTATIN CALCIUM 20 MG/1
TABLET, FILM COATED ORAL
Qty: 90 TABLET | Refills: 3 | Status: SHIPPED | OUTPATIENT
Start: 2022-02-08

## 2022-02-08 RX ORDER — GLIPIZIDE AND METFORMIN HCL 5; 500 MG/1; MG/1
TABLET, FILM COATED ORAL
Qty: 180 TABLET | Refills: 3 | Status: SHIPPED | OUTPATIENT
Start: 2022-02-08

## 2022-02-08 NOTE — TELEPHONE ENCOUNTER
Medication:   Requested Prescriptions     Pending Prescriptions Disp Refills    atorvastatin (LIPITOR) 20 MG tablet [Pharmacy Med Name: ATORVASTATIN TAB 20MG] 90 tablet 3     Sig: TAKE 1 TABLET ONCE DAILY    glipiZIDE-metFORMIN (METAGLIP) 5-500 MG per tablet [Pharmacy Med Name: GLIP/METFORM TAB 5-500MG] 180 tablet 3     Sig: TAKE 1 TABLET TWICE DAILY  BEFORE MEALS    levothyroxine (SYNTHROID) 88 MCG tablet [Pharmacy Med Name: SYNTHROID TAB 88MCG] 90 tablet 3     Sig: TAKE 1 TABLET ONCE DAILY        Last Filled:  02/07/2021    Patient Phone Number: [phone] (home)     Last appt: 2/7/2022   Next appt: Visit date not found    Last OARRS: No flowsheet data found.

## 2022-02-10 NOTE — TELEPHONE ENCOUNTER
Medication:   Requested Prescriptions     Pending Prescriptions Disp Refills    sildenafil (VIAGRA) 100 MG tablet 10 tablet 5     Sig: Take 1 tablet by mouth as needed for Erectile Dysfunction        Last Filled: 2/7/2022   Last appt: 2/7/2022   Next appt: 8/8/2022

## 2022-02-10 NOTE — TELEPHONE ENCOUNTER
Pt stated the prescription was sent to [facility] but would like it to be sent to [facility] due to it being cheaper.      Please advise pt     Phone # [phone]    sildenafil (VIAGRA) 100 MG tablet    [facility]

## 2022-02-11 RX ORDER — SILDENAFIL 100 MG/1
100 TABLET, FILM COATED ORAL PRN
Qty: 10 TABLET | Refills: 5 | Status: SHIPPED | OUTPATIENT
Start: 2022-02-11

## 2022-02-14 DIAGNOSIS — E11.9 TYPE 2 DIABETES MELLITUS WITHOUT COMPLICATION, WITHOUT LONG-TERM CURRENT USE OF INSULIN (HCC): ICD-10-CM

## 2022-02-14 DIAGNOSIS — Z12.5 SCREENING PSA (PROSTATE SPECIFIC ANTIGEN): ICD-10-CM

## 2022-02-14 DIAGNOSIS — E03.9 HYPOTHYROIDISM, UNSPECIFIED TYPE: ICD-10-CM

## 2022-02-14 DIAGNOSIS — M1A.09X0 IDIOPATHIC CHRONIC GOUT OF MULTIPLE SITES WITHOUT TOPHUS: ICD-10-CM

## 2022-02-14 LAB
A/G RATIO: 1.8 (ref 1.1–2.2)
ALBUMIN SERPL-MCNC: 4.3 G/DL (ref 3.4–5)
ALP BLD-CCNC: 73 U/L (ref 40–129)
ALT SERPL-CCNC: 21 U/L (ref 10–40)
ANION GAP SERPL CALCULATED.3IONS-SCNC: 11 MMOL/L (ref 3–16)
AST SERPL-CCNC: 22 U/L (ref 15–37)
BILIRUB SERPL-MCNC: 0.5 MG/DL (ref 0–1)
BUN BLDV-MCNC: 19 MG/DL (ref 7–20)
CALCIUM SERPL-MCNC: 9 MG/DL (ref 8.3–10.6)
CHLORIDE BLD-SCNC: 101 MMOL/L (ref 99–110)
CHOLESTEROL, TOTAL: 151 MG/DL (ref 0–199)
CO2: 25 MMOL/L (ref 21–32)
CREAT SERPL-MCNC: 1 MG/DL (ref 0.8–1.3)
GFR AFRICAN AMERICAN: >60
GFR NON-AFRICAN AMERICAN: >60
GLUCOSE BLD-MCNC: 155 MG/DL (ref 70–99)
HDLC SERPL-MCNC: 37 MG/DL (ref 40–60)
LDL CHOLESTEROL CALCULATED: 96 MG/DL
POTASSIUM SERPL-SCNC: 4.6 MMOL/L (ref 3.5–5.1)
SODIUM BLD-SCNC: 137 MMOL/L (ref 136–145)
TOTAL PROTEIN: 6.7 G/DL (ref 6.4–8.2)
TRIGL SERPL-MCNC: 88 MG/DL (ref 0–150)
TSH SERPL DL<=0.05 MIU/L-ACNC: 2.6 UIU/ML (ref 0.27–4.2)
URIC ACID, SERUM: 6.7 MG/DL (ref 3.5–7.2)
VLDLC SERPL CALC-MCNC: 18 MG/DL

## 2022-02-15 LAB
ESTIMATED AVERAGE GLUCOSE: 137 MG/DL
HBA1C MFR BLD: 6.4 %
PROSTATE SPECIFIC ANTIGEN: 2.59 NG/ML (ref 0–4)

## 2022-02-18 ENCOUNTER — TELEPHONE (OUTPATIENT)
Dept: FAMILY MEDICINE CLINIC | Age: 82
End: 2022-02-18

## 2022-02-21 RX ORDER — ALLOPURINOL 100 MG/1
TABLET ORAL
Qty: 135 TABLET | Refills: 3 | Status: SHIPPED | OUTPATIENT
Start: 2022-02-21

## 2022-02-21 RX ORDER — BLOOD SUGAR DIAGNOSTIC
STRIP MISCELLANEOUS
Qty: 200 STRIP | Refills: 0 | Status: SHIPPED | OUTPATIENT
Start: 2022-02-21 | End: 2022-05-09

## 2022-02-21 NOTE — TELEPHONE ENCOUNTER
Medication:   Requested Prescriptions     Pending Prescriptions Disp Refills    allopurinol (ZYLOPRIM) 100 MG tablet [Pharmacy Med Name: ALLOPURINOL  TAB 100MG] 135 tablet 3     Sig: TAKE 1 AND 1/2 TABLETS ONCEDAILY    ONETOUCH ULTRA strip [Pharmacy Med Name: ONE TOUCH JANETH ULTRA] 200 strip 0     Sig: TEST ONCE DAILY AND AS     NEEDED FOR SYMPTOMS OF     IRREGULAR BLOOD GLUCOSE        Last Filled:  12/03/2019    Patient Phone Number: [phone] (home)     Last appt: 2/7/2022   Next appt: 8/8/2022    Last OARRS: No flowsheet data found.

## 2022-05-09 RX ORDER — LANCETS 33 GAUGE
EACH MISCELLANEOUS
Qty: 100 EACH | Refills: 2 | Status: SHIPPED | OUTPATIENT
Start: 2022-05-09 | End: 2022-10-26

## 2022-05-09 RX ORDER — BLOOD SUGAR DIAGNOSTIC
STRIP MISCELLANEOUS
Qty: 200 STRIP | Refills: 0 | Status: SHIPPED | OUTPATIENT
Start: 2022-05-09 | End: 2022-08-23

## 2022-05-09 NOTE — TELEPHONE ENCOUNTER
Medication:   Requested Prescriptions     Pending Prescriptions Disp Refills    Lancets ([address]) [address] [facility] [Pharmacy Med Name: ONE [facility] 93V  LNC DELICAPL]  2     Sig: USE AND DISCARD 1 LANCET 2 TIMES DAILY    [address] strip [Pharmacy Med Name: ONE TOUCH JANETH ULTRA] 200 strip 0     Sig: TEST ONCE DAILY AND AS     NEEDED FOR SYMPTOMS OF     IRREGULAR BLOOD GLUCOSE      Last appt: 2/7/2022   Next appt: 8/8/2022

## 2022-08-08 ENCOUNTER — OFFICE VISIT (OUTPATIENT)
Dept: FAMILY MEDICINE CLINIC | Age: 82
End: 2022-08-08
Payer: MEDICARE

## 2022-08-08 VITALS
WEIGHT: 172 LBS | OXYGEN SATURATION: 98 % | DIASTOLIC BLOOD PRESSURE: 72 MMHG | HEIGHT: 67 IN | BODY MASS INDEX: 27 KG/M2 | SYSTOLIC BLOOD PRESSURE: 132 MMHG | HEART RATE: 67 BPM

## 2022-08-08 DIAGNOSIS — E78.2 MIXED HYPERLIPIDEMIA: ICD-10-CM

## 2022-08-08 DIAGNOSIS — M1A.09X0 IDIOPATHIC CHRONIC GOUT OF MULTIPLE SITES WITHOUT TOPHUS: ICD-10-CM

## 2022-08-08 DIAGNOSIS — E03.9 HYPOTHYROIDISM, UNSPECIFIED TYPE: ICD-10-CM

## 2022-08-08 DIAGNOSIS — I10 ESSENTIAL HYPERTENSION: ICD-10-CM

## 2022-08-08 DIAGNOSIS — E11.9 TYPE 2 DIABETES MELLITUS WITHOUT COMPLICATION, WITHOUT LONG-TERM CURRENT USE OF INSULIN (HCC): ICD-10-CM

## 2022-08-08 DIAGNOSIS — E11.9 TYPE 2 DIABETES MELLITUS WITHOUT COMPLICATION, WITHOUT LONG-TERM CURRENT USE OF INSULIN (HCC): Primary | ICD-10-CM

## 2022-08-08 LAB
ANION GAP SERPL CALCULATED.3IONS-SCNC: 16 MMOL/L (ref 3–16)
BUN BLDV-MCNC: 20 MG/DL (ref 7–20)
CALCIUM SERPL-MCNC: 9.2 MG/DL (ref 8.3–10.6)
CHLORIDE BLD-SCNC: 101 MMOL/L (ref 99–110)
CHOLESTEROL, FASTING: 160 MG/DL (ref 0–199)
CO2: 21 MMOL/L (ref 21–32)
CREAT SERPL-MCNC: 1.1 MG/DL (ref 0.8–1.3)
CREATININE URINE: 69.5 MG/DL (ref 39–259)
GFR AFRICAN AMERICAN: >60
GFR NON-AFRICAN AMERICAN: >60
GLUCOSE BLD-MCNC: 116 MG/DL (ref 70–99)
HBA1C MFR BLD: 6.3 %
HDLC SERPL-MCNC: 37 MG/DL (ref 40–60)
LDL CHOLESTEROL CALCULATED: 93 MG/DL
MICROALBUMIN UR-MCNC: <1.2 MG/DL
MICROALBUMIN/CREAT UR-RTO: NORMAL MG/G (ref 0–30)
POTASSIUM SERPL-SCNC: 4.5 MMOL/L (ref 3.5–5.1)
SODIUM BLD-SCNC: 138 MMOL/L (ref 136–145)
TRIGLYCERIDE, FASTING: 148 MG/DL (ref 0–150)
URIC ACID, SERUM: 7.6 MG/DL (ref 3.5–7.2)
VLDLC SERPL CALC-MCNC: 30 MG/DL

## 2022-08-08 PROCEDURE — 83036 HEMOGLOBIN GLYCOSYLATED A1C: CPT | Performed by: FAMILY MEDICINE

## 2022-08-08 PROCEDURE — 1123F ACP DISCUSS/DSCN MKR DOCD: CPT | Performed by: FAMILY MEDICINE

## 2022-08-08 PROCEDURE — 3044F HG A1C LEVEL LT 7.0%: CPT | Performed by: FAMILY MEDICINE

## 2022-08-08 PROCEDURE — 99214 OFFICE O/P EST MOD 30 MIN: CPT | Performed by: FAMILY MEDICINE

## 2022-08-08 SDOH — ECONOMIC STABILITY: FOOD INSECURITY: WITHIN THE PAST 12 MONTHS, THE FOOD YOU BOUGHT JUST DIDN'T LAST AND YOU DIDN'T HAVE MONEY TO GET MORE.: NEVER TRUE

## 2022-08-08 SDOH — ECONOMIC STABILITY: FOOD INSECURITY: WITHIN THE PAST 12 MONTHS, YOU WORRIED THAT YOUR FOOD WOULD RUN OUT BEFORE YOU GOT MONEY TO BUY MORE.: NEVER TRUE

## 2022-08-08 ASSESSMENT — SOCIAL DETERMINANTS OF HEALTH (SDOH): HOW HARD IS IT FOR YOU TO PAY FOR THE VERY BASICS LIKE FOOD, HOUSING, MEDICAL CARE, AND HEATING?: NOT HARD AT ALL

## 2022-08-08 NOTE — PROGRESS NOTES
[address] [facility] (:  1940) is a [de-identified] y.o. male,Established patient, here for evaluation of the following chief complaint(s):  6 Month Follow-Up (Patient is not fasting, here for f/u.)         ASSESSMENT/PLAN:  Juanita Eller was seen today for 6 month follow-up. Diagnoses and all orders for this visit:    Type 2 diabetes mellitus without complication, without long-term current use of insulin ([facility])  -     POCT glycosylated hemoglobin (Hb A1C)  -     Basic Metabolic Panel; Future  well controlled on glipizide-met  Idiopathic chronic gout of multiple sites without tophus  -     Uric Acid; Future  Recheck uric acid. If much over 6 suggested resuming 1/2 daily  Hypothyroidism, unspecified type  Stable on levo  Essential hypertension   The current medical regimen is effective;  continue present plan and medications. No follow-ups on file. Subjective   SUBJECTIVE/OBJECTIVE:  HPI  Pt is a of [de-identified] y.o. male comes in today with   Chief Complaint   Patient presents with    6 Month Follow-Up     Patient is not fasting, here for f/u.     follow up  Feeling well. Rides or walks most days or go to the gym. Cut to 1/2 allopurinol qod    Review of Systems       Objective   Physical Exam  Constitutional:       Appearance: He is well-developed. HENT:      Head: Normocephalic. Eyes:      Conjunctiva/sclera: Conjunctivae normal.   Cardiovascular:      Rate and Rhythm: Normal rate. Pulses:           Radial pulses are 2+ on the right side and 2+ on the left side. Posterior tibial pulses are 2+ on the right side and 2+ on the left side. Heart sounds: Normal heart sounds. No murmur heard. Pulmonary:      Effort: Pulmonary effort is normal.      Breath sounds: Normal breath sounds. No rales. Musculoskeletal:      Cervical back: Neck supple. Lymphadenopathy:      Cervical: No cervical adenopathy. Skin:     General: Skin is warm and dry. Nails: There is no clubbing.       Comments: Sensory exam of the foot is normal, tested with the monofilament. No lesions or ulcers. Neurological:      Mental Status: He is alert and oriented to person, place, and time. Cranial Nerves: No cranial nerve deficit. Sensory: No sensory deficit. An electronic signature was used to authenticate this note.     --Charli Bahena MD

## 2022-08-22 RX ORDER — LISINOPRIL 20 MG/1
TABLET ORAL
Qty: 180 TABLET | Refills: 0 | Status: SHIPPED | OUTPATIENT
Start: 2022-08-22

## 2022-08-22 NOTE — TELEPHONE ENCOUNTER
Medication:   Requested Prescriptions     Pending Prescriptions Disp Refills    ONETOUCH ULTRA strip [Pharmacy Med Name: ONE TOUCH [employer] ULTRA] 200 strip 0     Sig: TEST ONCE DAILY AND AS     NEEDED FOR SYMPTOMS OF     IRREGULAR BLOOD GLUCOSE        Last Filled: 8/9/2022   Last appt: 8/8/2022   Next appt: 2/6/2023

## 2022-08-22 NOTE — TELEPHONE ENCOUNTER
Last OV: 12/8/21 NPTS  Last labs: 8/8/22 BMP  Appt scheduled :  9/7/22 [facility]

## 2022-08-23 RX ORDER — BLOOD SUGAR DIAGNOSTIC
STRIP MISCELLANEOUS
Qty: 200 STRIP | Refills: 0 | Status: SHIPPED | OUTPATIENT
Start: 2022-08-23

## 2022-09-04 NOTE — PROGRESS NOTES
[address]    2022    Daniel Ng [address] [place] [address] (:  1940) is a [de-identified] y.o. male is here for follow up and management of CAD and modifiable risk factors. Referring Provider: Nic Velazquez MD    HISTORY:  Mr. Lennox Camps has a history of coronary artery disease treated medically. Additional history includes HTN, Hyperlipidemia, DM. He has had two episodes of syncope (since ) while getting out of bed to go to the restroom which occurred while having severe leg cramps and/or while urinating. Patient had associated nausea and diaphoresis. He was seen in the ER for dizziness (10/2017) which was thought to be related to a sinus infection. 2013 Holter was negative for significant arrhythmias. Today, he denies significant chest discomfort, shortness of breath, light headedness, dizziness or palpitations. His wife passed away in [place]. . Since then he has been busy with riding his motorcycles as well as walking and working out. Prior to Visit Medications    Medication Sig Taking?  Authorizing Provider   ONETOUCH ULTRA strip TEST ONCE DAILY AND AS     NEEDED FOR SYMPTOMS OF     IRREGULAR BLOOD GLUCOSE Yes Nic Velazquez MD   lisinopril (PRINIVIL;ZESTRIL) 20 MG tablet TAKE 1 TABLET TWICE A DAY Yes RICARDO Walton - ZEESHAN   Lancets (Suman Mike) [address] USE AND DISCARD 1 LANCET 2 TIMES DAILY Yes Nic Velazquez MD   allopurinol (ZYLOPRIM) 100 MG tablet TAKE 1 AND 1/2 TABLETS ONCEDAILY  Patient taking differently: Take 50 mg by mouth daily Yes Nic Velazquez MD   sildenafil (VIAGRA) 100 MG tablet Take 1 tablet by mouth as needed for Erectile Dysfunction Yes Nic Velazquez MD   atorvastatin (LIPITOR) 20 MG tablet TAKE 1 TABLET ONCE DAILY Yes Nic Velazquez MD   glipiZIDE-metFORMIN (METAGLIP) 5-500 MG per tablet TAKE 1 TABLET TWICE DAILY  BEFORE MEALS Yes Nic Velazquez MD   levothyroxine (SYNTHROID) 88 MCG tablet TAKE 1 TABLET ONCE DAILY Yes *    Dr. Michael Half - bilateral    UPPER GASTROINTESTINAL ENDOSCOPY      AV malformation, Booth's       Social History     Tobacco Use    Smoking status: Never    Smokeless tobacco: Never   Substance Use Topics    Alcohol use: Yes     Comment: rarely        Family History   Problem Relation Age of Onset    Other Father 80         ASHD    Other Mother 80         - dementia    Heart Disease Neg Hx     High Blood Pressure Neg Hx     High Cholesterol Neg Hx        PHYSICAL EXAMINATION:  Vitals:    22 1311   BP: 114/70   Pulse: 89   SpO2: 97%   Weight: 172 lb 9.6 oz (78.3 kg)   Height: 5' 6\" (1.676 m)     Estimated body mass index is 27.86 kg/m² as calculated from the following:    Height as of this encounter: 5' 6\" (1.676 m). Weight as of this encounter: 172 lb 9.6 oz (78.3 kg). Physical Exam  Constitutional:       Appearance: He is well-developed. He is not diaphoretic. HENT:      Head: Normocephalic and atraumatic. Eyes:      General: No scleral icterus. Extraocular Movements: Extraocular movements intact. Conjunctiva/sclera: Conjunctivae normal.   Neck:      Vascular: No JVD. Cardiovascular:      Rate and Rhythm: Normal rate and regular rhythm. Heart sounds: Normal heart sounds. No murmur heard. No friction rub. No gallop. Pulmonary:      Effort: Pulmonary effort is normal. No respiratory distress. Breath sounds: Normal breath sounds. No wheezing or rales. Abdominal:      General: Bowel sounds are normal.      Palpations: Abdomen is soft. Tenderness: There is no abdominal tenderness. Musculoskeletal:         General: Normal range of motion. Cervical back: Normal range of motion and neck supple. Skin:     General: Skin is warm and dry. Findings: No rash. Neurological:      General: No focal deficit present. Mental Status: He is alert and oriented to person, place, and time.    Psychiatric:         Mood and Affect: Mood normal.         Behavior: Behavior normal.         Thought Content: Thought content normal.         Judgment: Judgment normal.           I have reviewed all pertinent lab results and diagnostic testing. Lab Results   Component Value Date/Time    CHOL 151 02/14/2022 08:44 AM    TRIG 88 02/14/2022 08:44 AM    HDL 37 08/08/2022 08:59 AM    HDL 31 05/10/2012 09:05 AM    LDLCALC 93 08/08/2022 08:59 AM     Lab Results   Component Value Date/Time     08/08/2022 08:59 AM    K 4.5 08/08/2022 08:59 AM     08/08/2022 08:59 AM    CO2 21 08/08/2022 08:59 AM    GLUCOSE 116 08/08/2022 08:59 AM    BUN 20 08/08/2022 08:59 AM    CREATININE 1.1 08/08/2022 08:59 AM    CALCIUM 9.2 08/08/2022 08:59 AM    GFR >60 06/07/2013 08:18 AM    GFRAA >60 08/08/2022 08:59 AM    GFRAA >60 06/07/2013 08:18 AM     Lab Results   Component Value Date/Time    ALT 21 02/14/2022 08:44 AM    AST 22 02/14/2022 08:44 AM       Myoview GXT 3/31/21:   Summary    The overall quality of the study is fair. There is subdiaphragmatic    attenuation. Left ventricular cavity is mildly dilated at 128 ml. The right ventricle is    normal in size. There is a small size perfusion defect of mild intensity in the apex, apical    inferior, and mid inferior regions. The defect is present at rest and    stress, consistent with artifact or scar. There is no reversibility. Gated spect reveals normal myocardial thickening and wall motion. Sum stress score of 7. There is no visual TID. Calculated tid is 1.01. Myocardial perfusion is abnormal with fixed inferior defect. In comparison    to previous study, there is no change. Low-to-moderate risk scan           Echo 3/31/21:  Summary   Normal left ventricle size, wall thickness and systolic function with an   estimated ejection fraction of 50%. No regional wall motion abnormalities   are seen. E/e\"= 12.3 .    Grade I diastolic dysfunction with normal LV filling pressures. Normal function of all valves    ECG 2/4/19: SB, RBBB, 47 bpm    Myoview GXT 3/5/18:  Summary    -There is a small sized, mild to moderate intensity, fixed defect of the    inferior which is consistent with likely secondary to bowel artifact.    -Normal LV function.    -Low risk scan. RBBB on resting ECG      Plain GXT 10/16/15:  He exercised 9:30, achieved target HR, with appropriate BP response. Echo 5/31/14:  Summary   -Normal left ventricle size, wall thickness and systolic function with an   estimated ejection fraction of 55%.   -No regional wall motion abnormalities are seen. -There is reversal of E/A inflow velocities across the mitral valve   suggesting impaired left ventricular relaxation.   -Aortic valve appears sclerotic but opens adequately. -Trivial mitral and mild pulmonic regurgitation is present.   -The left atrium is at the upper limits of normal in size. [facility] 12/2011: - LAD 75% with flow wire 0.87, D-2 95%, RCA prox 30%, treated medically. ASSESSMENT/PLAN:  1. Coronary artery disease due to lipid rich plaque  - [facility] 2011 showed non flow limiting stenosis in LAD, D-2 95%, RCA prox 30%. MPI prior to angiogram showed no reversible ischemia. Treated medically. -  2015 plain GXT was normal, no ischemic changes  - 3/5/18 MPI - small sized small to moderate intensity fixed defect of inferior wall likely secondary to bowel artifact  - 3/31/21 Myoview GXT - small defect in apex, apical inferior and mid inferior regions c/w artifact or scar. No reversibility. No change from prior MPI (done to evaluate atypical chest discomfort)   - 3/31/21 Echo - EF 50%, no regional wall motion abnormalities  -  tx with ASA , atorvastatin    Plan:  Resume EC ASA 81 mg daily. He was off Lipitor but horn resumed in the past 1 month. Check lipids in 2 months.     2.  Bradycardia  - 7/6/19 ECG - SB, HR 47, RBBB (Atenolol stopped)   - 8/10/20 ECG - SB, HR 53 bpm, RBBB   - 3/16/21 ECG its entirety. I confirm that the note above accurately reflects all work, treatment, procedures, and medical decision making performed by me.

## 2022-09-07 ENCOUNTER — OFFICE VISIT (OUTPATIENT)
Dept: CARDIOLOGY CLINIC | Age: 82
End: 2022-09-07
Payer: MEDICARE

## 2022-09-07 VITALS
SYSTOLIC BLOOD PRESSURE: 114 MMHG | HEART RATE: 89 BPM | DIASTOLIC BLOOD PRESSURE: 70 MMHG | OXYGEN SATURATION: 97 % | BODY MASS INDEX: 27.74 KG/M2 | HEIGHT: 66 IN | WEIGHT: 172.6 LBS

## 2022-09-07 DIAGNOSIS — R42 LIGHT HEADEDNESS: ICD-10-CM

## 2022-09-07 DIAGNOSIS — I45.10 RBBB: ICD-10-CM

## 2022-09-07 DIAGNOSIS — R00.1 BRADYCARDIA: ICD-10-CM

## 2022-09-07 DIAGNOSIS — I10 ESSENTIAL HYPERTENSION: ICD-10-CM

## 2022-09-07 DIAGNOSIS — I25.10 ASHD (ARTERIOSCLEROTIC HEART DISEASE): Primary | ICD-10-CM

## 2022-09-07 DIAGNOSIS — E11.9 TYPE 2 DIABETES MELLITUS WITHOUT COMPLICATION, WITHOUT LONG-TERM CURRENT USE OF INSULIN (HCC): ICD-10-CM

## 2022-09-07 DIAGNOSIS — E78.49 OTHER HYPERLIPIDEMIA: ICD-10-CM

## 2022-09-07 PROCEDURE — 93000 ELECTROCARDIOGRAM COMPLETE: CPT | Performed by: INTERNAL MEDICINE

## 2022-09-07 PROCEDURE — 1123F ACP DISCUSS/DSCN MKR DOCD: CPT | Performed by: INTERNAL MEDICINE

## 2022-09-07 PROCEDURE — 99214 OFFICE O/P EST MOD 30 MIN: CPT | Performed by: INTERNAL MEDICINE

## 2022-09-07 PROCEDURE — 3044F HG A1C LEVEL LT 7.0%: CPT | Performed by: INTERNAL MEDICINE

## 2022-09-07 NOTE — PATIENT INSTRUCTIONS
Recommend taking aspirin (enteric coated) 81 mg daily   Want bad cholesterol (LDL) under 70 (LDL was 93). Continue Atorvastatin 20 mg for now but recheck fasting lipids, AST, ALT in two months (mid November).   If LDL is still above 70, we will try to increase statin dose   Call our office with any concerning cardiac symptoms  Follow up in one year with Dr. Walter Barton

## 2022-10-26 RX ORDER — LANCETS 33 GAUGE
EACH MISCELLANEOUS
Qty: 100 EACH | Refills: 2 | Status: SHIPPED | OUTPATIENT
Start: 2022-10-26

## 2022-10-26 NOTE — TELEPHONE ENCOUNTER
Medication:   Requested Prescriptions     Pending Prescriptions Disp Refills    Lancets ([address]) [address] [facility] [Pharmacy Med Name: [facility]]  2     Sig: USE AND DISCARD 1 LANCET 2 TIMES DAILY        Last Filled: 12/30/2019   Last appt: 8/8/2022   Next appt: 2/6/2023

## 2022-11-21 DIAGNOSIS — I25.10 ASHD (ARTERIOSCLEROTIC HEART DISEASE): ICD-10-CM

## 2022-11-21 DIAGNOSIS — E78.49 OTHER HYPERLIPIDEMIA: ICD-10-CM

## 2022-11-21 LAB
ALT SERPL-CCNC: 19 U/L (ref 10–40)
AST SERPL-CCNC: 22 U/L (ref 15–37)
CHOLESTEROL, TOTAL: 154 MG/DL (ref 0–199)
HDLC SERPL-MCNC: 30 MG/DL (ref 40–60)
LDL CHOLESTEROL CALCULATED: 94 MG/DL
TRIGL SERPL-MCNC: 151 MG/DL (ref 0–150)
VLDLC SERPL CALC-MCNC: 30 MG/DL

## 2022-11-21 RX ORDER — BLOOD SUGAR DIAGNOSTIC
STRIP MISCELLANEOUS
Qty: 200 STRIP | Refills: 0 | Status: SHIPPED | OUTPATIENT
Start: 2022-11-21

## 2022-11-22 NOTE — RESULT ENCOUNTER NOTE
Last ov 9/2021. Recheck lipid in November and if LDL still not at goal, consider increasing lipitor (currently 20 mg). Will review with [facility] for dosing.

## 2022-11-23 ENCOUNTER — TELEPHONE (OUTPATIENT)
Dept: CARDIOLOGY CLINIC | Age: 82
End: 2022-11-23

## 2022-11-23 RX ORDER — ATORVASTATIN CALCIUM 40 MG/1
40 TABLET, FILM COATED ORAL DAILY
Qty: 90 TABLET | Refills: 3 | Status: SHIPPED | OUTPATIENT
Start: 2022-11-23

## 2022-11-23 RX ORDER — LISINOPRIL 20 MG/1
TABLET ORAL
Qty: 180 TABLET | Refills: 0 | Status: SHIPPED | OUTPATIENT
Start: 2022-11-23

## 2022-11-23 NOTE — TELEPHONE ENCOUNTER
Called patient and [facility] for patient to return call to office for message below. Hydronephrosis with urinary obstruction due to ureteral calculus

## 2022-11-23 NOTE — TELEPHONE ENCOUNTER
----- Message from Ricardo Aguilar RN sent at 11/23/2022  9:24 AM EST -----  Larisa Quiroz reviewed labs. LDL is 94 would like it <70. Please have Yeimi Gay increase his Lipitor from 20 mg to 40 mg. If he is ok with this, ok to send Rx for 40 mg tab.

## 2022-11-23 NOTE — RESULT ENCOUNTER NOTE
[facility] reviewed labs. LDL is 94 would like it <70. Please have Demetri Bernabe increase his Lipitor from 20 mg to 40 mg. If he is ok with this, ok to send Rx for 40 mg tab.

## 2022-11-23 NOTE — TELEPHONE ENCOUNTER
Vijay Curry returned call. He is OK with increasing his atorvastatin to 40 mg, he is concerned for leg cramping as he has leg pains from arthritis already. He will try it and call if he has any issues. Will order for lipid panel to recheck in 3 months. Vijay Curry agrees with plan.

## 2022-12-23 ENCOUNTER — TELEPHONE (OUTPATIENT)
Dept: FAMILY MEDICINE CLINIC | Age: 82
End: 2022-12-23

## 2022-12-23 NOTE — TELEPHONE ENCOUNTER
Patients friend called, stopped by [facility] and saw provider there per friend. Wanted codeine cough medication called in (pt in background), friend states pt unable to talk on phone due to laryngitis, discussed since I did not evaluate him, advised to call [facility] provider back, friend states [facility] provider could not give codeine cough medication. Advised to try supportive treatment and OTC HTN safe cough medication but due to not seeing pt or assessing in person if he is doing any worse advised to follow up with provider they saw or go to ER. Can see pt next week in office if not improving.

## 2023-01-27 RX ORDER — GLIPIZIDE AND METFORMIN HCL 5; 500 MG/1; MG/1
TABLET, FILM COATED ORAL
Qty: 180 TABLET | Refills: 3 | Status: SHIPPED | OUTPATIENT
Start: 2023-01-27

## 2023-01-27 RX ORDER — LEVOTHYROXINE SODIUM 88 UG/1
TABLET ORAL
Qty: 90 TABLET | Refills: 3 | Status: SHIPPED | OUTPATIENT
Start: 2023-01-27

## 2023-01-27 NOTE — TELEPHONE ENCOUNTER
Medication:   Requested Prescriptions     Pending Prescriptions Disp Refills    glipiZIDE-metFORMIN (METAGLIP) 5-500 MG per tablet [Pharmacy Med Name: GLIP/METFORM TAB 5-500MG] 180 tablet 3     Sig: TAKE 1 TABLET TWICE DAILY  BEFORE MEALS    levothyroxine (SYNTHROID) 88 MCG tablet [Pharmacy Med Name: SYNTHROID TAB 88MCG] 90 tablet 3     Sig: TAKE 1 TABLET ONCE DAILY        Last Filled:  2/8/22    Patient Phone Number: [phone] (home)     Last appt: 8/8/2022   Next appt: 2/6/2023    Last OARRS: No flowsheet data found.

## 2023-02-06 RX ORDER — ATORVASTATIN CALCIUM 20 MG/1
TABLET, FILM COATED ORAL
Qty: 90 TABLET | Refills: 3 | Status: SHIPPED | OUTPATIENT
Start: 2023-02-06

## 2023-02-06 NOTE — TELEPHONE ENCOUNTER
Medication:   Requested Prescriptions     Pending Prescriptions Disp Refills    atorvastatin (LIPITOR) 20 MG tablet [Pharmacy Med Name: ATORVASTATIN TAB 20MG] 90 tablet 3     Sig: TAKE 1 TABLET ONCE DAILY        Last Filled: 11/23/2022   Last appt: 8/8/2022   Next appt: 2/14/2023

## 2023-02-08 NOTE — TELEPHONE ENCOUNTER
Medication:   Requested Prescriptions     Pending Prescriptions Disp Refills    blood glucose test strips (ASCENSIA AUTODISC VI;ONE TOUCH ULTRA TEST VI) strip 100 each 1     Si each by In Vitro route 2 times daily As needed.         Last Filled: 2019   Last appt: 2022   Next appt: 2023

## 2023-02-13 RX ORDER — LANCETS 33 GAUGE
EACH MISCELLANEOUS
Qty: 100 EACH | Refills: 2 | Status: SHIPPED | OUTPATIENT
Start: 2023-02-13

## 2023-02-14 ENCOUNTER — OFFICE VISIT (OUTPATIENT)
Dept: FAMILY MEDICINE CLINIC | Age: 83
End: 2023-02-14
Payer: MEDICARE

## 2023-02-14 VITALS
DIASTOLIC BLOOD PRESSURE: 78 MMHG | SYSTOLIC BLOOD PRESSURE: 138 MMHG | HEART RATE: 96 BPM | HEIGHT: 66 IN | OXYGEN SATURATION: 96 % | WEIGHT: 174.8 LBS | BODY MASS INDEX: 28.09 KG/M2

## 2023-02-14 DIAGNOSIS — Z00.00 MEDICARE ANNUAL WELLNESS VISIT, SUBSEQUENT: Primary | ICD-10-CM

## 2023-02-14 DIAGNOSIS — Z12.5 SCREENING PSA (PROSTATE SPECIFIC ANTIGEN): ICD-10-CM

## 2023-02-14 DIAGNOSIS — E11.9 TYPE 2 DIABETES MELLITUS WITHOUT COMPLICATION, WITHOUT LONG-TERM CURRENT USE OF INSULIN (HCC): ICD-10-CM

## 2023-02-14 DIAGNOSIS — E03.9 HYPOTHYROIDISM, UNSPECIFIED TYPE: ICD-10-CM

## 2023-02-14 DIAGNOSIS — M1A.09X0 IDIOPATHIC CHRONIC GOUT OF MULTIPLE SITES WITHOUT TOPHUS: ICD-10-CM

## 2023-02-14 PROCEDURE — 1123F ACP DISCUSS/DSCN MKR DOCD: CPT | Performed by: FAMILY MEDICINE

## 2023-02-14 PROCEDURE — G0439 PPPS, SUBSEQ VISIT: HCPCS | Performed by: FAMILY MEDICINE

## 2023-02-14 PROCEDURE — 3075F SYST BP GE 130 - 139MM HG: CPT | Performed by: FAMILY MEDICINE

## 2023-02-14 PROCEDURE — 3078F DIAST BP <80 MM HG: CPT | Performed by: FAMILY MEDICINE

## 2023-02-14 SDOH — ECONOMIC STABILITY: FOOD INSECURITY: WITHIN THE PAST 12 MONTHS, YOU WORRIED THAT YOUR FOOD WOULD RUN OUT BEFORE YOU GOT MONEY TO BUY MORE.: NEVER TRUE

## 2023-02-14 SDOH — ECONOMIC STABILITY: HOUSING INSECURITY
IN THE LAST 12 MONTHS, WAS THERE A TIME WHEN YOU DID NOT HAVE A STEADY PLACE TO SLEEP OR SLEPT IN A SHELTER (INCLUDING NOW)?: NO

## 2023-02-14 SDOH — ECONOMIC STABILITY: INCOME INSECURITY: HOW HARD IS IT FOR YOU TO PAY FOR THE VERY BASICS LIKE FOOD, HOUSING, MEDICAL CARE, AND HEATING?: NOT HARD AT ALL

## 2023-02-14 SDOH — ECONOMIC STABILITY: FOOD INSECURITY: WITHIN THE PAST 12 MONTHS, THE FOOD YOU BOUGHT JUST DIDN'T LAST AND YOU DIDN'T HAVE MONEY TO GET MORE.: NEVER TRUE

## 2023-02-14 ASSESSMENT — LIFESTYLE VARIABLES
HOW MANY STANDARD DRINKS CONTAINING ALCOHOL DO YOU HAVE ON A TYPICAL DAY: 1 OR 2
HOW OFTEN DO YOU HAVE A DRINK CONTAINING ALCOHOL: MONTHLY OR LESS

## 2023-02-14 ASSESSMENT — PATIENT HEALTH QUESTIONNAIRE - PHQ9
SUM OF ALL RESPONSES TO PHQ QUESTIONS 1-9: 0
SUM OF ALL RESPONSES TO PHQ9 QUESTIONS 1 & 2: 0
1. LITTLE INTEREST OR PLEASURE IN DOING THINGS: 0
2. FEELING DOWN, DEPRESSED OR HOPELESS: 0
SUM OF ALL RESPONSES TO PHQ QUESTIONS 1-9: 0

## 2023-02-14 NOTE — PROGRESS NOTES
Medicare Annual Wellness Visit    Myron Oneal is here for Medicare AWV (Not fasting.)    Assessment & Plan   Medicare annual wellness visit, subsequent  Type 2 diabetes mellitus without complication, without long-term current use of insulin ([address]  -     Basic Metabolic Panel; Future  -     Hemoglobin A1C; Future  well controlled on metaglip  Screening PSA (prostate specific antigen)  -     PSA Screening; Future  Idiopathic chronic gout of multiple sites without tophus   Stable on uric acid     Recommendations for Preventive Services Due: see orders and patient instructions/AVS.  Recommended screening schedule for the next 5-10 years is provided to the patient in written form: see Patient Instructions/AVS.     Return for Medicare Annual Wellness Visit in 1 year. Subjective       Patient's complete Health Risk Assessment and screening values have been reviewed and are found in Flowsheets. The following problems were reviewed today and where indicated follow up appointments were made and/or referrals ordered. Positive Risk Factor Screenings with Interventions:       Cognitive: Words recalled: 3 Words Recalled   Clock Drawing Test (CDT): (!) Abnormal   Total Score: 3   Total Score Interpretation: Normal Mini-Cog      Interventions:  Patient declines any further evaluation or treatment                                   Objective   Vitals:    02/14/23 0920   BP: 138/78   Site: Left Upper Arm   Position: Sitting   Cuff Size: Medium Adult   Pulse: 96   SpO2: 96%   Weight: 174 lb 12.8 oz (79.3 kg)   Height: 5' 6\" (1.676 m)      Body mass index is 28.21 kg/m².         General Appearance: alert and oriented to person, place and time, well-developed and well-nourished, in no acute distress  Pulmonary/Chest: clear to auscultation bilaterally- no wheezes, rales or rhonchi, normal air movement, no respiratory distress  Cardiovascular: normal rate, normal S1 and S2, no gallops, intact distal pulses, and no carotid bruits  Sensory exam of the foot is normal, tested with the monofilament. Good pulses, no lesions or ulcers, good peripheral pulses. No Known Allergies  Prior to Visit Medications    Medication Sig Taking? Authorizing Provider   Lancets (ONETOUCH DELICA PLUS [id number]) [address] [facility] USE AND DISCARD 1 LANCET 2 TIMES DAILY Yes Anson Cardenas MD   blood glucose test strips (ASCENSIA AUTODISC VI;ONE TOUCH ULTRA TEST VI) strip 1 each by In Vitro route 2 times daily As needed. Yes Anson Cardenas MD   atorvastatin (LIPITOR) 20 MG tablet TAKE 1 TABLET ONCE DAILY Yes Anson Cardenas MD   glipiZIDE-metFORMIN (METAGLIP) 5-500 MG per tablet TAKE 1 TABLET TWICE DAILY  BEFORE MEALS Yes Anson Cardenas MD   levothyroxine (SYNTHROID) 88 MCG tablet TAKE 1 TABLET ONCE DAILY Yes Anson Cardenas MD   lisinopril (PRINIVIL;ZESTRIL) 20 MG tablet TAKE 1 TABLET TWICE A DAY Yes Gaby Arroyo MD   atorvastatin (LIPITOR) 40 MG tablet Take 1 tablet by mouth daily TAKE 1 TABLET ONCE DAILY Yes Gaby Arroyo MD   ONETOUCH ULTRA strip TEST ONCE DAILY AND AS     NEEDED FOR SYMPTOMS OF     IRREGULAR BLOOD GLUCOSE Yes Anson Cardenas MD   allopurinol (ZYLOPRIM) 100 MG tablet TAKE 1 AND 1/2 TABLETS ONCEDAILY  Patient taking differently: Take 50 mg by mouth daily Yes Anson Cardenas MD   sildenafil (VIAGRA) 100 MG tablet Take 1 tablet by mouth as needed for Erectile Dysfunction Yes Anson Cardenas MD   Esomeprazole Magnesium (NEXIUM PO) Take 20 mg by mouth once a week Yes Historical Provider, MD   blood glucose test strips (ONE TOUCH ULTRA TEST) strip USE AS DIRECTED TWICE DAILY Yes Mya Jeffery MD   ONE TOUCH LANCETS MISC 1 each by Does not apply route 2 times daily Yes Mya Jeffery MD   famotidine (PEPCID) 10 MG tablet Take 10 mg by mouth every morning (before breakfast)  Yes Historical Provider, MD   Multiple Vitamins-Minerals (CENTRUM SILVER PO) Take  by mouth.  Yes Historical Provider, MD Mueller (Including outside providers/suppliers regularly involved in providing care):   Patient Care Team:  Saman Perez MD as PCP - General (Family Medicine)  Saman Perez MD as PCP - Empaneled Provider  Mitra Majano MD as Consulting Physician (Gastroenterology)  Papo Alvarez as Consulting Physician (Ophthalmology)  Meng Armstrong as Consulting Physician (Rheumatology)  Amy Espana MD as Consulting Physician (Cardiology)  Jordi Lockwood as Consulting Physician (Hematology and Oncology)     Reviewed and updated this visit:  Tobacco  Allergies  Meds  Med Hx  Surg Hx  Soc Hx  Fam Hx             Saman Perez MD

## 2023-02-14 NOTE — PATIENT INSTRUCTIONS
Advance Directives: Care Instructions  Overview  An advance directive is a legal way to state your wishes at the end of your life. It tells your family and your doctor what to do if you can't say what you want. There are two main types of advance directives. You can change them any time your wishes change. Living will. This form tells your family and your doctor your wishes about life support and other treatment. The form is also called a declaration. Medical power of . This form lets you name a person to make treatment decisions for you when you can't speak for yourself. This person is called a health care agent (health care proxy, health care surrogate). The form is also called a durable power of  for health care. If you do not have an advance directive, decisions about your medical care may be made by a family member, or by a doctor or a  who doesn't know you. It may help to think of an advance directive as a gift to the people who care for you. If you have one, they won't have to make tough decisions by themselves. For more information, including forms for your state, see the [address] website (www.caringinfo.org/planning/advance-directives/). Follow-up care is a key part of your treatment and safety. Be sure to make and go to all appointments, and call your doctor if you are having problems. It's also a good idea to know your test results and keep a list of the medicines you take. What should you include in an advance directive? Many states have a unique advance directive form. (It may ask you to address specific issues.) Or you might use a universal form that's approved by many states. If your form doesn't tell you what to address, it may be hard to know what to include in your advance directive. Use the questions below to help you get started. Who do you want to make decisions about your medical care if you are not able to?   What life-support measures do you want if you have a serious illness that gets worse over time or can't be cured? What are you most afraid of that might happen? (Maybe you're afraid of having pain, losing your independence, or being kept alive by machines.)  Where would you prefer to die? (Your home? A hospital? A nursing home?)  Do you want to donate your organs when you die? Do you want certain Anabaptist practices performed before you die? When should you call for help? Be sure to contact your doctor if you have any questions. Where can you learn more? Go to http://www.mccormick.com/ and enter R264 to learn more about \"Advance Directives: Care Instructions. \"  Current as of: June 16, 2022               Content Version: 13.5  © 2006-2022 [employer]. Care instructions adapted under license by [facility] ([facility]). If you have questions about a medical condition or this instruction, always ask your healthcare professional. [place] [address] any warranty or liability for your use of this information. A Healthy Heart: Care Instructions  Your Care Instructions     Coronary artery disease, also called heart disease, occurs when a substance called plaque builds up in the vessels that supply oxygen-rich blood to your heart muscle. This can narrow the blood vessels and reduce blood flow. A heart attack happens when blood flow is completely blocked. A high-fat diet, smoking, and other factors increase the risk of heart disease. Your doctor has found that you have a chance of having heart disease. You can do lots of things to keep your heart healthy. It may not be easy, but you can change your diet, exercise more, and quit smoking. These steps really work to lower your chance of heart disease. Follow-up care is a key part of your treatment and safety. Be sure to make and go to all appointments, and call your doctor if you are having problems.  It's also a good idea to know your test results and keep a list of the medicines you take. How can you care for yourself at home? Diet    Use less salt when you cook and eat. This helps lower your blood pressure. Taste food before salting. Add only a little salt when you think you need it. With time, your taste buds will adjust to less salt.     Eat fewer snack items, fast foods, canned soups, and other high-salt, high-fat, processed foods.     Read food labels and try to avoid saturated and trans fats. They increase your risk of heart disease by raising cholesterol levels.     Limit the amount of solid fat-butter, margarine, and shortening-you eat. Use olive, peanut, or canola oil when you cook. Bake, broil, and steam foods instead of frying them.     Eat a variety of fruit and vegetables every day. Dark green, deep orange, red, or yellow fruits and vegetables are especially good for you. Examples include spinach, carrots, peaches, and berries.     Foods high in fiber can reduce your cholesterol and provide important vitamins and minerals. High-fiber foods include whole-grain cereals and breads, oatmeal, beans, brown rice, citrus fruits, and apples.     Eat lean proteins. Heart-healthy proteins include seafood, lean meats and poultry, eggs, beans, peas, nuts, seeds, and soy products.     Limit drinks and foods with added sugar. These include candy, desserts, and soda pop. Lifestyle changes    If your doctor recommends it, get more exercise. Walking is a good choice. Bit by bit, increase the amount you walk every day. Try for at least 30 minutes on most days of the week. You also may want to swim, bike, or do other activities.     Do not smoke. If you need help quitting, talk to your doctor about stop-smoking programs and medicines. These can increase your chances of quitting for good. Quitting smoking may be the most important step you can take to protect your heart. It is never too late to quit.     Limit alcohol to 2 drinks a day for men and 1 drink a day for women.  Too much alcohol can cause health problems.     Manage other health problems such as diabetes, high blood pressure, and high cholesterol. If you think you may have a problem with alcohol or drug use, talk to your doctor. Medicines    Take your medicines exactly as prescribed. Call your doctor if you think you are having a problem with your medicine.     If your doctor recommends aspirin, take the amount directed each day. Make sure you take aspirin and not another kind of pain reliever, such as acetaminophen (Tylenol). When should you call for help? Call 911 if you have symptoms of a heart attack. These may include:    Chest pain or pressure, or a strange feeling in the chest.     Sweating.     Shortness of breath.     Pain, pressure, or a strange feeling in the back, neck, jaw, or upper belly or in one or both shoulders or arms.     Lightheadedness or sudden weakness.     A fast or irregular heartbeat. After you call 911, the  may tell you to chew 1 adult-strength or 2 to 4 low-dose aspirin. Wait for an ambulance. Do not try to drive yourself. Watch closely for changes in your health, and be sure to contact your doctor if you have any problems. Where can you learn more? Go to http://www.mccormick.com/ and enter F075 to learn more about \"A Healthy Heart: Care Instructions. \"  Current as of: September 7, 2022               Content Version: 13.5  © 9774-1641 [employer]. Care instructions adapted under license by [facility] ([facility]). If you have questions about a medical condition or this instruction, always ask your healthcare professional. [place] [address] any warranty or liability for your use of this information. Personalized Preventive Plan for Ching Arzate - 2/14/2023  Medicare offers a range of preventive health benefits. Some of the tests and screenings are paid in full while other may be subject to a deductible, co-insurance, and/or copay.     Some of these benefits include a comprehensive review of your medical history including lifestyle, illnesses that may run in your family, and various assessments and screenings as appropriate. After reviewing your medical record and screening and assessments performed today your provider may have ordered immunizations, labs, imaging, and/or referrals for you. A list of these orders (if applicable) as well as your Preventive Care list are included within your After Visit Summary for your review. Other Preventive Recommendations:    A preventive eye exam performed by an eye specialist is recommended every 1-2 years to screen for glaucoma; cataracts, macular degeneration, and other eye disorders. A preventive dental visit is recommended every 6 months. Try to get at least 150 minutes of exercise per week or 10,000 steps per day on a pedometer . Order or download the FREE \"Exercise & Physical Activity: Your Everyday Guide\" from [employer]. Call 3-463.194.8684 or search [employer] online. You need 1016-4756 mg of calcium and 4235-7611 IU of vitamin D per day. It is possible to meet your calcium requirement with diet alone, but a vitamin D supplement is usually necessary to meet this goal.  When exposed to the sun, use a sunscreen that protects against both UVA and UVB radiation with an SPF of 30 or greater. Reapply every 2 to 3 hours or after sweating, drying off with a towel, or swimming. Always wear a seat belt when traveling in a car. Always wear a helmet when riding a bicycle or motorcycle.

## 2023-02-20 ENCOUNTER — TELEPHONE (OUTPATIENT)
Dept: FAMILY MEDICINE CLINIC | Age: 83
End: 2023-02-20

## 2023-02-20 RX ORDER — BLOOD SUGAR DIAGNOSTIC
STRIP MISCELLANEOUS
Qty: 200 STRIP | Refills: 0 | OUTPATIENT
Start: 2023-02-20

## 2023-02-20 NOTE — TELEPHONE ENCOUNTER
----- Message from Cameron Bates MD sent at 2/18/2023  1:37 PM EST -----  A1c stable. Thyroid, psa normal  Uric acid stable.

## 2023-02-21 RX ORDER — LISINOPRIL 20 MG/1
TABLET ORAL
Qty: 180 TABLET | Refills: 3 | Status: SHIPPED | OUTPATIENT
Start: 2023-02-21

## 2023-02-22 ENCOUNTER — TELEPHONE (OUTPATIENT)
Dept: CARDIOLOGY CLINIC | Age: 83
End: 2023-02-22

## 2023-02-22 DIAGNOSIS — E78.49 OTHER HYPERLIPIDEMIA: Primary | ICD-10-CM

## 2023-02-22 NOTE — TELEPHONE ENCOUNTER
Pt called wanting to speak to [facility] RN, they stated they thought they were suppose to get labs done they went to get them done and there were no orders in the system, pt wants to speak to RN about the orders.     Pls advise thank you     Alyce Miller   [phone]

## 2023-02-27 DIAGNOSIS — E78.49 OTHER HYPERLIPIDEMIA: ICD-10-CM

## 2023-02-27 LAB
ALBUMIN SERPL-MCNC: 4.2 G/DL (ref 3.4–5)
ALP BLD-CCNC: 74 U/L (ref 40–129)
ALT SERPL-CCNC: 16 U/L (ref 10–40)
AST SERPL-CCNC: 19 U/L (ref 15–37)
BILIRUB SERPL-MCNC: 0.5 MG/DL (ref 0–1)
BILIRUBIN DIRECT: <0.2 MG/DL (ref 0–0.3)
BILIRUBIN, INDIRECT: NORMAL MG/DL (ref 0–1)
CHOLESTEROL, FASTING: 143 MG/DL (ref 0–199)
HDLC SERPL-MCNC: 30 MG/DL (ref 40–60)
LDL CHOLESTEROL CALCULATED: 86 MG/DL
TOTAL PROTEIN: 6.5 G/DL (ref 6.4–8.2)
TRIGLYCERIDE, FASTING: 136 MG/DL (ref 0–150)
VLDLC SERPL CALC-MCNC: 27 MG/DL

## 2023-03-13 RX ORDER — LANCETS 33 GAUGE
EACH MISCELLANEOUS
Qty: 100 EACH | Refills: 2 | Status: SHIPPED | OUTPATIENT
Start: 2023-03-13

## 2023-03-13 NOTE — TELEPHONE ENCOUNTER
Medication:   Requested Prescriptions     Pending Prescriptions Disp Refills    Lancets ([address]) [address] [Pharmacy Med Name: [facility] [address]  [facility]]  2     Sig: USE AND DISCARD 1 LANCET 2 TIMES DAILY        Last Filled: 2/13/2023   Last appt: 2/14/2023   Next appt: none

## 2023-05-22 RX ORDER — ALLOPURINOL 100 MG/1
TABLET ORAL
Qty: 135 TABLET | Refills: 3 | Status: SHIPPED | OUTPATIENT
Start: 2023-05-22

## 2023-05-22 NOTE — TELEPHONE ENCOUNTER
Medication:   Requested Prescriptions     Pending Prescriptions Disp Refills    allopurinol (ZYLOPRIM) 100 MG tablet 135 tablet 3        Last Filled: 2/21/2022 - can you please verify dose?    Last appt: 2/14/2023   Next appt: 2/16/2024

## 2023-06-01 ENCOUNTER — TELEPHONE (OUTPATIENT)
Dept: FAMILY MEDICINE CLINIC | Age: 83
End: 2023-06-01

## 2023-06-02 RX ORDER — ALLOPURINOL 100 MG/1
150 TABLET ORAL DAILY
Qty: 135 TABLET | Refills: 3 | Status: SHIPPED | OUTPATIENT
Start: 2023-06-02 | End: 2023-06-02 | Stop reason: SDUPTHER

## 2023-06-02 RX ORDER — ALLOPURINOL 100 MG/1
150 TABLET ORAL DAILY
Qty: 21 TABLET | Refills: 0 | Status: SHIPPED | OUTPATIENT
Start: 2023-06-02

## 2023-06-05 ENCOUNTER — TELEPHONE (OUTPATIENT)
Dept: FAMILY MEDICINE CLINIC | Age: 83
End: 2023-06-05

## 2023-06-05 RX ORDER — BUSPIRONE HYDROCHLORIDE 10 MG/1
10 TABLET ORAL 3 TIMES DAILY PRN
Qty: 30 TABLET | Refills: 0 | Status: SHIPPED | OUTPATIENT
Start: 2023-06-05 | End: 2023-06-09
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
## 2023-06-07 NOTE — TELEPHONE ENCOUNTER
Pt called to say the Buspar Rx is not working. Pt also stated that Dr. Veronica Ugarte would like to see him in the office if he's requesting something stronger. No appts available. Pt does not want to see Nurse Mergy. Please call and advise.

## 2023-06-09 ENCOUNTER — OFFICE VISIT (OUTPATIENT)
Dept: FAMILY MEDICINE CLINIC | Age: 83
End: 2023-06-09
Payer: MEDICARE

## 2023-06-09 VITALS
OXYGEN SATURATION: 97 % | BODY MASS INDEX: 27.64 KG/M2 | SYSTOLIC BLOOD PRESSURE: 114 MMHG | RESPIRATION RATE: 16 BRPM | HEART RATE: 94 BPM | DIASTOLIC BLOOD PRESSURE: 64 MMHG | HEIGHT: 66 IN | WEIGHT: 172 LBS | TEMPERATURE: 97.5 F

## 2023-06-09 DIAGNOSIS — E04.1 THYROID NODULE: ICD-10-CM

## 2023-06-09 DIAGNOSIS — F41.0 PANIC ATTACK: ICD-10-CM

## 2023-06-09 DIAGNOSIS — F41.9 ANXIETY: Primary | ICD-10-CM

## 2023-06-09 PROCEDURE — 99214 OFFICE O/P EST MOD 30 MIN: CPT | Performed by: FAMILY MEDICINE

## 2023-06-09 PROCEDURE — 3078F DIAST BP <80 MM HG: CPT | Performed by: FAMILY MEDICINE

## 2023-06-09 PROCEDURE — 1123F ACP DISCUSS/DSCN MKR DOCD: CPT | Performed by: FAMILY MEDICINE

## 2023-06-09 PROCEDURE — 3074F SYST BP LT 130 MM HG: CPT | Performed by: FAMILY MEDICINE

## 2023-06-09 RX ORDER — CITALOPRAM 10 MG/1
10 TABLET ORAL DAILY
Qty: 30 TABLET | Refills: 3 | Status: SHIPPED | OUTPATIENT
Start: 2023-06-09

## 2023-06-09 RX ORDER — LORAZEPAM 0.5 MG/1
0.5 TABLET ORAL 2 TIMES DAILY PRN
Qty: 15 TABLET | Refills: 0 | Status: SHIPPED | OUTPATIENT
Start: 2023-06-09 | End: 2023-07-09
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
## 2023-06-19 ENCOUNTER — HOSPITAL ENCOUNTER (OUTPATIENT)
Dept: PHYSICAL THERAPY | Age: 83
Setting detail: THERAPIES SERIES
Discharge: HOME | End: 2023-06-19
Payer: MEDICARE

## 2023-06-19 PROCEDURE — 97140 MANUAL THERAPY 1/> REGIONS: CPT | Performed by: PHYSICAL THERAPIST

## 2023-06-19 PROCEDURE — 97110 THERAPEUTIC EXERCISES: CPT | Performed by: PHYSICAL THERAPIST

## 2023-06-23 ENCOUNTER — HOSPITAL ENCOUNTER (OUTPATIENT)
Dept: ULTRASOUND IMAGING | Age: 83
Discharge: HOME | End: 2023-06-23
Attending: FAMILY MEDICINE
Payer: MEDICARE

## 2023-06-23 DIAGNOSIS — E04.1 THYROID NODULE: ICD-10-CM

## 2023-06-23 PROCEDURE — 76536 US EXAM OF HEAD AND NECK: CPT

## 2023-06-27 ENCOUNTER — HOSPITAL ENCOUNTER (OUTPATIENT)
Dept: PHYSICAL THERAPY | Age: 83
Setting detail: THERAPIES SERIES
Discharge: HOME | End: 2023-06-27
Payer: MEDICARE

## 2023-06-27 LAB
CATARACTS: NEGATIVE
DIABETIC RETINOPATHY: NEGATIVE
GLAUCOMA: NEGATIVE
INTRAOCULAR PRESSURE EYE: 11
INTRAOCULAR PRESSURE EYE: 12
VISUAL ACUITY DISTANCE LEFT EYE: NORMAL
VISUAL ACUITY DISTANCE RIGHT EYE: NORMAL

## 2023-06-27 PROCEDURE — 97140 MANUAL THERAPY 1/> REGIONS: CPT | Performed by: PHYSICAL THERAPIST

## 2023-06-27 PROCEDURE — 97110 THERAPEUTIC EXERCISES: CPT | Performed by: PHYSICAL THERAPIST

## 2023-07-03 ENCOUNTER — HOSPITAL ENCOUNTER (OUTPATIENT)
Dept: PHYSICAL THERAPY | Age: 83
Setting detail: THERAPIES SERIES
Discharge: HOME | End: 2023-07-03
Payer: MEDICARE

## 2023-07-03 PROCEDURE — 97110 THERAPEUTIC EXERCISES: CPT | Performed by: PHYSICAL THERAPIST

## 2023-07-03 PROCEDURE — 97140 MANUAL THERAPY 1/> REGIONS: CPT | Performed by: PHYSICAL THERAPIST

## 2023-07-03 NOTE — FLOWSHEET NOTE
[address], [address] [place] [address]          Date:  7/3/2023    Patient Name:  Danya Zamora    :  1940  MRN: [id number]  Restrictions/Precautions:    Medical/Treatment Diagnosis Information:  Diagnosis: Left rotator cuff tear (M75.102)    Treatment Diagnosis: Left shoulder pain (G06.737)  Insurance/Certification information:   [employer]   Physician Information:   Dr. Olman Lucas  Has the plan of care been signed (Y/N):        [x]  Yes  []  No     Date of Patient follow up with Physician: 23      Is this a Progress Report:     []  Yes  [x]  No        If Yes:  Date Range for reporting period:  Beginnin23  Ending    Progress report will be due (10 Rx or 30 days whichever is less):        Recertification will be due (POC Duration  / 90 days whichever is less):       Visit # Insurance Allowable Auth Required   In-person 4 4 visits through  []  Yes []  No    Telehealth   []  Yes []  No    Total            Functional Scale: UEFI 70/80 (13%)    Date assessed:  7/3/23      Therapy Diagnosis/Practice Pattern: D      Number of Comorbidities:  []0     []1-2    [x]3+    Latex Allergy:  [x]NO      []YES  Preferred Language for Healthcare:   [x]English       []other:      Pain level:  0-2/10     SUBJECTIVE:  Patient reports that he sees improvement in his shoulder since beginning PT. Most pain with lifting and reaching behind back, but overall less.      OBJECTIVE:   Observation:   Test measurements:  AROM left shoulder flexion abd abduction 145 deg, ER to T2, IR to L2; MMT: left shoulder flexion 4/5, abd 4/5 (mild pain), ER 3+/5, IR 4/5    RESTRICTIONS/PRECAUTIONS: hx of cervical fusion (10 years ago)    Exercises/Interventions:     Therapeutic Ex (55172) and NMR re-education (05291)  Sets/sec Reps Notes/CUES   Supine cane flexion     Sidelying ER 1# 3\" 2 x 10 Indep post initial scap cue   SL abd 2 10 PT man scap guidance

## 2023-07-10 ENCOUNTER — HOSPITAL ENCOUNTER (OUTPATIENT)
Dept: PHYSICAL THERAPY | Age: 83
Setting detail: THERAPIES SERIES
Discharge: HOME | End: 2023-07-10
Payer: COMMERCIAL

## 2023-07-10 ENCOUNTER — HOSPITAL ENCOUNTER (OUTPATIENT)
Dept: PHYSICAL THERAPY | Age: 83
Setting detail: THERAPIES SERIES
End: 2023-07-10
Payer: COMMERCIAL

## 2023-07-10 PROCEDURE — 97110 THERAPEUTIC EXERCISES: CPT | Performed by: PHYSICAL THERAPIST

## 2023-07-10 PROCEDURE — 97140 MANUAL THERAPY 1/> REGIONS: CPT | Performed by: PHYSICAL THERAPIST

## 2023-07-10 NOTE — FLOWSHEET NOTE
[address], [address] [place] [address]          Date:  7/10/2023    Patient Name:  Barak Cowart    :  1940  MRN: [id number]  Restrictions/Precautions:    Medical/Treatment Diagnosis Information:  Diagnosis: Left rotator cuff tear (M75.102)    Treatment Diagnosis: Left shoulder pain (L60.390)  Insurance/Certification information:   [employer]   Physician Information:   Dr. Lubna Malcolm  Has the plan of care been signed (Y/N):        [x]  Yes  []  No     Date of Patient follow up with Physician: 23      Is this a Progress Report:     []  Yes  [x]  No        If Yes:  Date Range for reporting period:  Beginnin23  Ending    Progress report will be due (10 Rx or 30 days whichever is less):        Recertification will be due (POC Duration  / 90 days whichever is less):       Visit # Insurance Allowable Auth Required   In-person 1  []  Yes []  No    Telehealth   []  Yes []  No    Total            Functional Scale: UEFI 70/80 (13%)    Date assessed:  7/3/23      Therapy Diagnosis/Practice Pattern: D      Number of Comorbidities:  []0     []1-2    [x]3+    Latex Allergy:  [x]NO      []YES  Preferred Language for Healthcare:   [x]English       []other:      Pain level:  0-2/10     SUBJECTIVE:  Patient reports that he sees improvement in his shoulder since beginning PT. Most pain with lifting and reaching behind back, but overall less.      OBJECTIVE:   Observation:   Test measurements:  PN NV    RESTRICTIONS/PRECAUTIONS: hx of cervical fusion (10 years ago)    Exercises/Interventions:     Therapeutic Ex (74251) and NMR re-education (96717)  Sets/sec Reps Notes/CUES   Supine cane flexion     Sidelying ER 1# 3\" 2 x 10 Indep post initial scap cue   SL abd 2 10 PT man scap guidance   SL shoulder flex to 90 1#  2 10 PT man scap guidance   Scap squeezes Reviewed form for HEP   No $ Cueing for posture and to avoid shrug

## 2023-07-17 ENCOUNTER — HOSPITAL ENCOUNTER (OUTPATIENT)
Dept: PHYSICAL THERAPY | Age: 83
Discharge: HOME | End: 2023-07-17
Payer: COMMERCIAL

## 2023-07-17 PROCEDURE — 97110 THERAPEUTIC EXERCISES: CPT | Performed by: PHYSICAL THERAPIST

## 2023-07-17 PROCEDURE — 97140 MANUAL THERAPY 1/> REGIONS: CPT | Performed by: PHYSICAL THERAPIST

## 2023-07-17 NOTE — PROGRESS NOTES
function, and ADLs as indicated by Functional Deficits. [x] Progressing: [] Met: [] Not Met: [] Adjusted    Long Term Goals: To be achieved in: 8 weeks  1. Disability index score of 4% or less for the [facility] to assist with reaching prior level of function. [] Progressing: [] Met: [x] Not Met: [] Adjusted  2. Patient will demonstrate increased pain-free AROM of left shoulder flexion and abduction to 140 and IR to T12 to allow for proper joint functioning as indicated by patients Functional Deficits and for patient to be able to get dressed without pain. [] Progressing: [x] Partially Met (flexion and abd): [] Not Met: [] Adjusted  3. Patient will demonstrate an increase in Strength in left shoulder to grossly 4/5 without pain to allow for proper functional mobility as indicated by patients Functional Deficits. [x] Progressing: [] Met: [] Not Met: [] Adjusted  4. Patient will be able to lift 5# weight above shoulder height without increased symptoms or restriction in order to be able to retrieve items off the top shelf of the refrigerator. [x] Progressing: [] Met: [] Not Met: [] Adjusted  5. Patient will be able to sleep without increased symptoms or restrictions. (patient specific functional goal)    [x] Progressing: [] Met: [] Not Met: [] Adjusted     Progression Towards Functional goals:  [x] Patient is progressing as expected towards functional goals listed. [] Progression is slowed due to complexities listed. [] Progression has been slowed due to co-morbidities. [] Plan just implemented, too soon to assess goals progression  [] Other:     ASSESSMENT:  Patient with improved AROM and strength in his left shoulder as compared to measurements taken 2 weeks ago. Most discomfort with resisted flexion and most weakness noted with external rotation, with limited RTC involvement due to tear.  Patient is progressing with function in his left shoulder and would benefit from continued skilled PT to increased

## 2023-07-24 ENCOUNTER — HOSPITAL ENCOUNTER (OUTPATIENT)
Dept: PHYSICAL THERAPY | Age: 83
Setting detail: THERAPIES SERIES
Discharge: HOME | End: 2023-07-24
Payer: COMMERCIAL

## 2023-07-24 PROCEDURE — 97140 MANUAL THERAPY 1/> REGIONS: CPT | Performed by: PHYSICAL THERAPIST

## 2023-07-24 PROCEDURE — 97110 THERAPEUTIC EXERCISES: CPT | Performed by: PHYSICAL THERAPIST
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
## 2023-07-31 ENCOUNTER — HOSPITAL ENCOUNTER (OUTPATIENT)
Dept: PHYSICAL THERAPY | Age: 83
Setting detail: THERAPIES SERIES
Discharge: HOME | End: 2023-07-31
Payer: COMMERCIAL

## 2023-07-31 PROCEDURE — 97140 MANUAL THERAPY 1/> REGIONS: CPT | Performed by: PHYSICAL THERAPIST

## 2023-07-31 PROCEDURE — 97110 THERAPEUTIC EXERCISES: CPT | Performed by: PHYSICAL THERAPIST

## 2023-07-31 NOTE — FLOWSHEET NOTE
related to strengthening, flexibility, endurance, ROM of scapular, scapulothoracic and UE control with self care, reaching, carrying, lifting, house/yardwork, driving/computer work  [] (24737) Reviewed/Progressed HEP activities related to improving balance, coordination, kinesthetic sense, posture, motor skill, proprioception of scapular, scapulothoracic and UE control with self care, reaching, carrying, lifting, house/yardwork, driving/computer work      Manual Treatments:  PROM / STM / Oscillations-Mobs:  G-I, II, III, IV (PA's, Inf., Post.)  [x] (50388) Provided manual therapy to mobilize soft tissue/joints of cervical/CT, scapular GHJ and UE for the purpose of modulating pain, promoting relaxation,  increasing ROM, reducing/eliminating soft tissue swelling/inflammation/restriction, improving soft tissue extensibility and allowing for proper ROM for normal function with self care, reaching, carrying, lifting, house/yardwork, driving/computer work    Modalities:  none this date    Charges  Timed Code Treatment Minutes: 43'   Total Treatment Minutes: 43'     [] EVAL (LOW) 43081   [] EVAL (MOD) 40062   [] EVAL (HIGH) 82263   [] RE-EVAL     [x] QW(20322) x  2   [] IONTO  [] NMR (82111) x     [] VASO  [x] Manual (82711) x 1     [] Other:  [] TA x      [] Mech Traction (13309)  [] ES(attended) (62678)      [] ES (un) (03269):     GOALS:  HEP instruction:   Access Code: [email or web address]  URL: [email or web address]  Date: 06/14/2023  Prepared by: Javon Pinto    GOALS:  Patient stated goal: \"Get back to normal\"  [x] Progressing: [] Met: [] Not Met: [] Adjusted    Therapist goals for Patient:   Short Term Goals: To be achieved in: 2 weeks  1. Independent in HEP and progression per patient tolerance, in order to prevent re-injury. [] Progressing: [x] Met: [] Not Met: [] Adjusted  2. Patient will have a decrease in pain to facilitate improvement in movement, function, and ADLs as indicated by Functional Deficits.   [x]

## 2023-08-07 ENCOUNTER — HOSPITAL ENCOUNTER (OUTPATIENT)
Dept: PHYSICAL THERAPY | Age: 83
Setting detail: THERAPIES SERIES
Discharge: HOME | End: 2023-08-07
Payer: MEDICARE

## 2023-08-07 PROCEDURE — 97110 THERAPEUTIC EXERCISES: CPT | Performed by: PHYSICAL THERAPIST

## 2023-08-07 PROCEDURE — 97140 MANUAL THERAPY 1/> REGIONS: CPT | Performed by: PHYSICAL THERAPIST
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
## 2023-08-16 ENCOUNTER — HOSPITAL ENCOUNTER (OUTPATIENT)
Dept: PHYSICAL THERAPY | Age: 83
Setting detail: THERAPIES SERIES
Discharge: HOME | End: 2023-08-16
Payer: MEDICARE

## 2023-08-16 PROCEDURE — 97140 MANUAL THERAPY 1/> REGIONS: CPT | Performed by: PHYSICAL THERAPIST

## 2023-08-16 PROCEDURE — 97110 THERAPEUTIC EXERCISES: CPT | Performed by: PHYSICAL THERAPIST

## 2023-08-16 NOTE — THERAPY RECERTIFICATION
promoting relaxation,  increasing ROM, reducing/eliminating soft tissue swelling/inflammation/restriction, improving soft tissue extensibility and allowing for proper ROM for normal function with self care, reaching, carrying, lifting, house/yardwork, driving/computer work    Modalities:  none this date    Charges  Timed Code Treatment Minutes: 45'   Total Treatment Minutes: 40'     [] EVAL (LOW) 455 1011   [] EVAL (MOD) 76516   [] EVAL (HIGH) 88942   [] RE-EVAL     [x] MO(05173) x  2  [] IONTO  [] NMR (00108) x     [] VASO  [x] Manual (99778) x 1     [] Other:  [] TA x      [] Mech Traction (93774)  [] ES(attended) (03328)      [] ES (un) (69949):     GOALS:  HEP instruction:   Access Code: [email or web address]  URL: [email or web address]  Date: 06/14/2023  Prepared by: Catrina Morales    GOALS:  Patient stated goal: \"Get back to normal\"  [x] Progressing: [] Met: [] Not Met: [] Adjusted    Therapist goals for Patient:   Short Term Goals: To be achieved in: 2 weeks  1. Independent in HEP and progression per patient tolerance, in order to prevent re-injury. [] Progressing: [x] Met: [] Not Met: [] Adjusted  2. Patient will have a decrease in pain to facilitate improvement in movement, function, and ADLs as indicated by Functional Deficits. [x] Progressing: [] Met: [] Not Met: [] Adjusted    Long Term Goals: To be achieved in: 6 more weeks from 8/16/23  1. Disability index score of 4% or less for the [facility] to assist with reaching prior level of function. [] Progressing: [] Met: [x] Not Met: [] Adjusted  2. Patient will demonstrate increased pain-free AROM of left shoulder flexion and abduction to 140 and IR to T12 to allow for proper joint functioning as indicated by patients Functional Deficits and for patient to be able to get dressed without pain. [] Progressing: [x] Partially Met (flexion and abd): [] Not Met: [] Adjusted  3.  Patient will demonstrate an increase in Strength in left shoulder to grossly 4/5 without

## 2023-08-21 ENCOUNTER — HOSPITAL ENCOUNTER (OUTPATIENT)
Dept: PHYSICAL THERAPY | Age: 83
Setting detail: THERAPIES SERIES
Discharge: HOME | End: 2023-08-21
Payer: MEDICARE

## 2023-08-21 PROCEDURE — 97110 THERAPEUTIC EXERCISES: CPT | Performed by: PHYSICAL THERAPIST

## 2023-08-21 PROCEDURE — 97140 MANUAL THERAPY 1/> REGIONS: CPT | Performed by: PHYSICAL THERAPIST
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
## 2023-08-30 NOTE — PATIENT INSTRUCTIONS
Continue your medications (EC Aspirin)  You look good today  Recheck fasting cholesterol before the holidays   If LDL is not at our goal of <70, increase Lipitor to 80 mg
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
## 2023-09-06 ENCOUNTER — OFFICE VISIT (OUTPATIENT)
Dept: CARDIOLOGY CLINIC | Age: 83
End: 2023-09-06
Payer: MEDICARE

## 2023-09-06 VITALS
HEIGHT: 66 IN | OXYGEN SATURATION: 96 % | HEART RATE: 100 BPM | SYSTOLIC BLOOD PRESSURE: 132 MMHG | WEIGHT: 173.8 LBS | BODY MASS INDEX: 27.93 KG/M2 | DIASTOLIC BLOOD PRESSURE: 80 MMHG

## 2023-09-06 DIAGNOSIS — I25.10 CORONARY ARTERY DISEASE DUE TO LIPID RICH PLAQUE: Primary | ICD-10-CM

## 2023-09-06 DIAGNOSIS — I10 ESSENTIAL HYPERTENSION: ICD-10-CM

## 2023-09-06 DIAGNOSIS — I25.83 CORONARY ARTERY DISEASE DUE TO LIPID RICH PLAQUE: Primary | ICD-10-CM

## 2023-09-06 DIAGNOSIS — E11.9 TYPE 2 DIABETES MELLITUS WITHOUT COMPLICATION, WITHOUT LONG-TERM CURRENT USE OF INSULIN (HCC): ICD-10-CM

## 2023-09-06 DIAGNOSIS — I45.10 RBBB: ICD-10-CM

## 2023-09-06 DIAGNOSIS — E78.49 OTHER HYPERLIPIDEMIA: ICD-10-CM

## 2023-09-06 PROCEDURE — 3078F DIAST BP <80 MM HG: CPT | Performed by: INTERNAL MEDICINE

## 2023-09-06 PROCEDURE — 99214 OFFICE O/P EST MOD 30 MIN: CPT | Performed by: INTERNAL MEDICINE

## 2023-09-06 PROCEDURE — 3044F HG A1C LEVEL LT 7.0%: CPT | Performed by: INTERNAL MEDICINE

## 2023-09-06 PROCEDURE — 93000 ELECTROCARDIOGRAM COMPLETE: CPT | Performed by: INTERNAL MEDICINE

## 2023-09-06 PROCEDURE — 3074F SYST BP LT 130 MM HG: CPT | Performed by: INTERNAL MEDICINE

## 2023-09-06 PROCEDURE — 1123F ACP DISCUSS/DSCN MKR DOCD: CPT | Performed by: INTERNAL MEDICINE

## 2023-10-02 RX ORDER — CITALOPRAM HYDROBROMIDE 10 MG/1
10 TABLET ORAL DAILY
Qty: 30 TABLET | Refills: 0 | Status: SHIPPED | OUTPATIENT
Start: 2023-10-02

## 2023-10-02 NOTE — TELEPHONE ENCOUNTER
Medication:   Requested Prescriptions     Pending Prescriptions Disp Refills    citalopram (CELEXA) 10 MG tablet [Pharmacy Med Name: Citalopram Hydrobromide 10 MG Oral Tablet] 30 tablet 0     Sig: Take 1 tablet by mouth once daily        Last Filled:  6/9/23    Patient Phone Number: [phone] (home)     Last appt: 6/15/2023   Next appt: 2/16/2024    Last OARRS:       6/9/2023    10:24 AM   RX Monitoring   Periodic Controlled Substance Monitoring Possible medication side effects, risk of tolerance/dependence & alternative treatments discussed. ;No signs of potential drug abuse or diversion identified.

## 2023-11-09 ENCOUNTER — TELEPHONE (OUTPATIENT)
Dept: FAMILY MEDICINE CLINIC | Age: 83
End: 2023-11-09

## 2023-12-06 NOTE — TELEPHONE ENCOUNTER
Medication:   Requested Prescriptions     Pending Prescriptions Disp Refills    sildenafil (VIAGRA) 100 MG tablet [Pharmacy Med Name: SILDENAFIL 100 MG TABLET] 10 tablet 5     Sig: TAKE 1 TABLET BY MOUTH AS NEEDED FOR ERECTILE DYSFUNCTION        Last Filled:      Patient Phone Number: [phone] (home)     Last appt: 6/15/2023   Next appt: 2/16/2024    Last OARRS:       6/9/2023    10:24 AM   RX Monitoring   Periodic Controlled Substance Monitoring Possible medication side effects, risk of tolerance/dependence & alternative treatments discussed. ;No signs of potential drug abuse or diversion identified.

## 2023-12-07 RX ORDER — SILDENAFIL 100 MG/1
100 TABLET, FILM COATED ORAL PRN
Qty: 10 TABLET | Refills: 5 | Status: SHIPPED | OUTPATIENT
Start: 2023-12-07

## 2023-12-27 DIAGNOSIS — E78.49 OTHER HYPERLIPIDEMIA: ICD-10-CM

## 2023-12-27 LAB
ALBUMIN SERPL-MCNC: 4.3 G/DL (ref 3.4–5)
ALP SERPL-CCNC: 85 U/L (ref 40–129)
ALT SERPL-CCNC: 19 U/L (ref 10–40)
AST SERPL-CCNC: 21 U/L (ref 15–37)
BILIRUB DIRECT SERPL-MCNC: <0.2 MG/DL (ref 0–0.3)
BILIRUB INDIRECT SERPL-MCNC: NORMAL MG/DL (ref 0–1)
BILIRUB SERPL-MCNC: 0.6 MG/DL (ref 0–1)
CHOLEST SERPL-MCNC: 112 MG/DL (ref 0–199)
HDLC SERPL-MCNC: 30 MG/DL (ref 40–60)
LDL CHOLESTEROL CALCULATED: 58 MG/DL
PROT SERPL-MCNC: 6.6 G/DL (ref 6.4–8.2)
TRIGL SERPL-MCNC: 119 MG/DL (ref 0–150)
VLDLC SERPL CALC-MCNC: 24 MG/DL

## 2023-12-28 ENCOUNTER — TELEPHONE (OUTPATIENT)
Dept: CARDIOLOGY CLINIC | Age: 83
End: 2023-12-28

## 2023-12-28 NOTE — TELEPHONE ENCOUNTER
----- Message from Bakari Shelley RN sent at 12/28/2023  7:21 AM EST -----  Cholesterol labs look good.  LDL is 58 and is now at goal!

## 2023-12-28 NOTE — TELEPHONE ENCOUNTER
I spoke with pt and relayed cholesterol lab results per [facility]. Pt verbalized understanding.

## 2024-01-31 ENCOUNTER — TELEPHONE (OUTPATIENT)
Dept: FAMILY MEDICINE CLINIC | Age: 84
End: 2024-01-31

## 2024-01-31 NOTE — TELEPHONE ENCOUNTER
Medication:   Requested Prescriptions     Pending Prescriptions Disp Refills    glipiZIDE-metFORMIN (METAGLIP) 5-500 MG per tablet 180 tablet 3    lisinopril (PRINIVIL;ZESTRIL) 20 MG tablet 180 tablet 3     Sig: Take 1 tablet by mouth 2 times daily    levothyroxine (SYNTHROID) 88 MCG tablet 90 tablet 3     Sig: TAKE 1 TABLET ONCE DAILY        Last Filled:  1/27/23    Patient Phone Number: [phone] (home)     Last appt: 6/15/2023   Next appt: 2/16/2024    Last OARRS:       6/9/2023    10:24 AM   RX Monitoring   Periodic Controlled Substance Monitoring Possible medication side effects, risk of tolerance/dependence & alternative treatments discussed.;No signs of potential drug abuse or diversion identified.

## 2024-01-31 NOTE — TELEPHONE ENCOUNTER
90 Day supply needed.  Send to [facility] Mail order pharmacy.     Glipizide-Metformin  Lisinopril  Synthroid

## 2024-02-02 RX ORDER — LEVOTHYROXINE SODIUM 88 UG/1
TABLET ORAL
Qty: 90 TABLET | Refills: 3 | Status: SHIPPED | OUTPATIENT
Start: 2024-02-02

## 2024-02-02 RX ORDER — GLIPIZIDE AND METFORMIN HCL 5; 500 MG/1; MG/1
TABLET, FILM COATED ORAL
Qty: 180 TABLET | Refills: 3 | Status: SHIPPED | OUTPATIENT
Start: 2024-02-02

## 2024-02-02 RX ORDER — LISINOPRIL 20 MG/1
20 TABLET ORAL 2 TIMES DAILY
Qty: 180 TABLET | Refills: 3 | Status: SHIPPED | OUTPATIENT
Start: 2024-02-02

## 2024-02-16 ENCOUNTER — OFFICE VISIT (OUTPATIENT)
Dept: FAMILY MEDICINE CLINIC | Age: 84
End: 2024-02-16
Payer: MEDICARE

## 2024-02-16 VITALS
HEIGHT: 66 IN | DIASTOLIC BLOOD PRESSURE: 80 MMHG | WEIGHT: 174 LBS | SYSTOLIC BLOOD PRESSURE: 142 MMHG | BODY MASS INDEX: 27.97 KG/M2 | HEART RATE: 81 BPM | OXYGEN SATURATION: 96 %

## 2024-02-16 DIAGNOSIS — E03.9 HYPOTHYROIDISM, UNSPECIFIED TYPE: ICD-10-CM

## 2024-02-16 DIAGNOSIS — Z00.00 MEDICARE ANNUAL WELLNESS VISIT, SUBSEQUENT: Primary | ICD-10-CM

## 2024-02-16 DIAGNOSIS — R53.83 FATIGUE, UNSPECIFIED TYPE: ICD-10-CM

## 2024-02-16 DIAGNOSIS — I10 ESSENTIAL HYPERTENSION: ICD-10-CM

## 2024-02-16 DIAGNOSIS — E11.9 TYPE 2 DIABETES MELLITUS WITHOUT COMPLICATION, WITHOUT LONG-TERM CURRENT USE OF INSULIN (HCC): ICD-10-CM

## 2024-02-16 DIAGNOSIS — Z12.5 SCREENING PSA (PROSTATE SPECIFIC ANTIGEN): ICD-10-CM

## 2024-02-16 LAB — HBA1C MFR BLD: 8.1 %

## 2024-02-16 PROCEDURE — 1123F ACP DISCUSS/DSCN MKR DOCD: CPT | Performed by: FAMILY MEDICINE

## 2024-02-16 PROCEDURE — 3079F DIAST BP 80-89 MM HG: CPT | Performed by: FAMILY MEDICINE

## 2024-02-16 PROCEDURE — 83036 HEMOGLOBIN GLYCOSYLATED A1C: CPT | Performed by: FAMILY MEDICINE

## 2024-02-16 PROCEDURE — 3052F HG A1C>EQUAL 8.0%<EQUAL 9.0%: CPT | Performed by: FAMILY MEDICINE

## 2024-02-16 PROCEDURE — G0439 PPPS, SUBSEQ VISIT: HCPCS | Performed by: FAMILY MEDICINE

## 2024-02-16 PROCEDURE — 3077F SYST BP >= 140 MM HG: CPT | Performed by: FAMILY MEDICINE

## 2024-02-16 NOTE — PROGRESS NOTES
Vitamins-Minerals (CENTRUM SILVER PO) Take  by mouth. Yes Provider, MD Jayla       CareTeam (Including outside providers/suppliers regularly involved in providing care):   Patient Care Team:  Jorge Rubio MD as PCP - General (Family Medicine)  Jorge Rubio MD as PCP - Empaneled Provider  Billy Soto MD as Consulting Physician (Gastroenterology)  Raul Louis MD as Consulting Physician (Ophthalmology)  Breanna Stinson as Consulting Physician (Rheumatology)  Jun Anglin MD as Consulting Physician (Cardiology)  Shaina Saleem as Consulting Physician (Hematology and Oncology)     Reviewed and updated this visit:  Tobacco  Allergies  Meds  Med Hx  Surg Hx  Soc Hx  Fam Hx

## 2024-02-16 NOTE — PATIENT INSTRUCTIONS
happens when blood flow is completely blocked. A high-fat diet, smoking, and other factors increase the risk of heart disease.  Your doctor has found that you have a chance of having heart disease. You can do lots of things to keep your heart healthy. It may not be easy, but you can change your diet, exercise more, and quit smoking. These steps really work to lower your chance of heart disease.  Follow-up care is a key part of your treatment and safety. Be sure to make and go to all appointments, and call your doctor if you are having problems. It's also a good idea to know your test results and keep a list of the medicines you take.  How can you care for yourself at home?  Diet    Use less salt when you cook and eat. This helps lower your blood pressure. Taste food before salting. Add only a little salt when you think you need it. With time, your taste buds will adjust to less salt.     Eat fewer snack items, fast foods, canned soups, and other high-salt, high-fat, processed foods.     Read food labels and try to avoid saturated and trans fats. They increase your risk of heart disease by raising cholesterol levels.     Limit the amount of solid fat-butter, margarine, and shortening-you eat. Use olive, peanut, or canola oil when you cook. Bake, broil, and steam foods instead of frying them.     Eat a variety of fruit and vegetables every day. Dark green, deep orange, red, or yellow fruits and vegetables are especially good for you. Examples include spinach, carrots, peaches, and berries.     Foods high in fiber can reduce your cholesterol and provide important vitamins and minerals. High-fiber foods include whole-grain cereals and breads, oatmeal, beans, brown rice, citrus fruits, and apples.     Eat lean proteins. Heart-healthy proteins include seafood, lean meats and poultry, eggs, beans, peas, nuts, seeds, and soy products.     Limit drinks and foods with added sugar. These include candy, desserts, and soda pop.

## 2024-03-05 DIAGNOSIS — Z12.5 SCREENING PSA (PROSTATE SPECIFIC ANTIGEN): ICD-10-CM

## 2024-03-05 DIAGNOSIS — R53.83 FATIGUE, UNSPECIFIED TYPE: ICD-10-CM

## 2024-03-05 DIAGNOSIS — E11.9 TYPE 2 DIABETES MELLITUS WITHOUT COMPLICATION, WITHOUT LONG-TERM CURRENT USE OF INSULIN (HCC): ICD-10-CM

## 2024-03-05 DIAGNOSIS — E03.9 HYPOTHYROIDISM, UNSPECIFIED TYPE: ICD-10-CM

## 2024-03-05 DIAGNOSIS — I10 ESSENTIAL HYPERTENSION: ICD-10-CM

## 2024-03-05 LAB
ANION GAP SERPL CALCULATED.3IONS-SCNC: 12 MMOL/L (ref 3–16)
BUN SERPL-MCNC: 20 MG/DL (ref 7–20)
CALCIUM SERPL-MCNC: 9.1 MG/DL (ref 8.3–10.6)
CHLORIDE SERPL-SCNC: 103 MMOL/L (ref 99–110)
CO2 SERPL-SCNC: 23 MMOL/L (ref 21–32)
CREAT SERPL-MCNC: 1.1 MG/DL (ref 0.8–1.3)
DEPRECATED RDW RBC AUTO: 21.9 % (ref 12.4–15.4)
GFR SERPLBLD CREATININE-BSD FMLA CKD-EPI: >60 ML/MIN/{1.73_M2}
GLUCOSE SERPL-MCNC: 144 MG/DL (ref 70–99)
HCT VFR BLD AUTO: 32.8 % (ref 40.5–52.5)
HGB BLD-MCNC: 11.3 G/DL (ref 13.5–17.5)
MCH RBC QN AUTO: 31.3 PG (ref 26–34)
MCHC RBC AUTO-ENTMCNC: 34.5 G/DL (ref 31–36)
MCV RBC AUTO: 90.7 FL (ref 80–100)
PLATELET # BLD AUTO: 230 K/UL (ref 135–450)
PLATELET BLD QL SMEAR: ADEQUATE
PMV BLD AUTO: 8.1 FL (ref 5–10.5)
POTASSIUM SERPL-SCNC: 4.7 MMOL/L (ref 3.5–5.1)
PSA SERPL DL<=0.01 NG/ML-MCNC: 1.74 NG/ML (ref 0–4)
RBC # BLD AUTO: 3.61 M/UL (ref 4.2–5.9)
REASON FOR REJECTION: NORMAL
REJECTED TEST: NORMAL
SODIUM SERPL-SCNC: 138 MMOL/L (ref 136–145)
TSH SERPL DL<=0.005 MIU/L-ACNC: 0.59 UIU/ML (ref 0.27–4.2)
VIT B12 SERPL-MCNC: 450 PG/ML (ref 211–911)
WBC # BLD AUTO: 9.8 K/UL (ref 4–11)

## 2024-03-06 ENCOUNTER — TELEPHONE (OUTPATIENT)
Dept: FAMILY MEDICINE CLINIC | Age: 84
End: 2024-03-06

## 2024-03-07 LAB
FOLATE SERPL-MCNC: 12.31 NG/ML (ref 4.78–24.2)
VIT B12 SERPL-MCNC: 499 PG/ML (ref 211–911)

## 2024-03-27 ENCOUNTER — TELEPHONE (OUTPATIENT)
Dept: FAMILY MEDICINE CLINIC | Age: 84
End: 2024-03-27

## 2024-04-01 NOTE — TELEPHONE ENCOUNTER
One touch ultra test strips  One touch delica lancets   [facility] [facility]

## 2024-04-03 RX ORDER — LANCETS 33 GAUGE
EACH MISCELLANEOUS
Qty: 100 EACH | Refills: 2 | Status: SHIPPED | OUTPATIENT
Start: 2024-04-03

## 2024-04-03 NOTE — TELEPHONE ENCOUNTER
May 9, 2019    KRYSTIAN BARNES  [address]  [facility] [address]        Dear Krystian:    At [facility], we are dedicated to improving your health and well-being. Enclosed is the Comprehensive Care Plan that we developed with you on 4/24/2019. Please review the Care Plan carefully.    As a reminder, some of the things we discussed at your visit include:    Your physical and mental health    Ways to reduce falls    Health care needs you may have    Don t forget to contact your care coordinator if you:    Have been hospitalized or plan to be hospitalized     Have had a fall     Have experienced a change in physical health    Are experiencing emotional problems     If you do not agree with your Care Plan, have questions about it, or have experienced a change in your needs, please call me at [phone]. If you are hearing impaired, please call the [place] [employer] at 741 or [phone] ([id number]-[id number] relay service).    Sincerely,        JAQUELIN Langley    E-mail: [email or web address]  Phone: [phone]    Care Manager  [id number] [phone] ([phone]     [id number] (11/18)                       Ok to pend

## 2024-05-17 ENCOUNTER — TELEPHONE (OUTPATIENT)
Dept: FAMILY MEDICINE CLINIC | Age: 84
End: 2024-05-17

## 2024-05-17 ENCOUNTER — TELEPHONE (OUTPATIENT)
Dept: CARDIOLOGY CLINIC | Age: 84
End: 2024-05-17

## 2024-05-17 NOTE — TELEPHONE ENCOUNTER
Pt states he's just dragging no energy, SOB when going up stairs. No swelling in lower extremities but his legs and feet twake him up two or three times in the night due to cramping.    Current B/P 187/83    He stated his B/P yesterday (5/16/2024) was 101/51 in the morning and 128/72 by evening.     129/67 reading on the 13th     Pt stated he was told he is slightly anemic at his last PCP visit a month ago.

## 2024-05-17 NOTE — TELEPHONE ENCOUNTER
Per KJC:   Have patient take his night time dose of lisinopril now for the high blood pressure. He probably needs updated testing so lets have him get scheduled with one of the NPs early next week. Thank you.

## 2024-05-17 NOTE — TELEPHONE ENCOUNTER
Please contact pt regarding having muscle spasms in both legs and feet.  Pt also states that he's experiencing a lot of fatigue.  Pt would like to speak to any Nurse on when he can get his blood checked again. He thinks the symptoms are coming from his Anemia.

## 2024-05-17 NOTE — TELEPHONE ENCOUNTER
Pt calling in letting [facility] know that the last month he has been feeling very out of it with no energy, doesn't say anything about SOB or CP but has been experiencing cramps in legs and his feet , please call pt and advise   Thank you

## 2024-05-17 NOTE — TELEPHONE ENCOUNTER
Spoke with pt and told him to take his night time dose of Lisinopril tonight.  Scheduled an appt with Sania Schneider for 5/20/24 @ 9:30am.  iva

## 2024-05-20 ENCOUNTER — OFFICE VISIT (OUTPATIENT)
Dept: CARDIOLOGY CLINIC | Age: 84
End: 2024-05-20
Payer: MEDICARE

## 2024-05-20 VITALS
OXYGEN SATURATION: 96 % | HEIGHT: 66 IN | BODY MASS INDEX: 26.33 KG/M2 | SYSTOLIC BLOOD PRESSURE: 130 MMHG | HEART RATE: 59 BPM | DIASTOLIC BLOOD PRESSURE: 62 MMHG | WEIGHT: 163.8 LBS

## 2024-05-20 DIAGNOSIS — I25.10 ASHD (ARTERIOSCLEROTIC HEART DISEASE): ICD-10-CM

## 2024-05-20 DIAGNOSIS — I10 ESSENTIAL HYPERTENSION: Primary | ICD-10-CM

## 2024-05-20 DIAGNOSIS — E78.49 OTHER HYPERLIPIDEMIA: ICD-10-CM

## 2024-05-20 PROCEDURE — 99214 OFFICE O/P EST MOD 30 MIN: CPT | Performed by: NURSE PRACTITIONER

## 2024-05-20 PROCEDURE — 93000 ELECTROCARDIOGRAM COMPLETE: CPT | Performed by: NURSE PRACTITIONER

## 2024-05-20 PROCEDURE — 3078F DIAST BP <80 MM HG: CPT | Performed by: NURSE PRACTITIONER

## 2024-05-20 PROCEDURE — 3075F SYST BP GE 130 - 139MM HG: CPT | Performed by: NURSE PRACTITIONER

## 2024-05-20 PROCEDURE — 1123F ACP DISCUSS/DSCN MKR DOCD: CPT | Performed by: NURSE PRACTITIONER

## 2024-05-20 RX ORDER — ROSUVASTATIN CALCIUM 20 MG/1
20 TABLET, COATED ORAL DAILY
Qty: 30 TABLET | Refills: 5 | Status: SHIPPED | OUTPATIENT
Start: 2024-05-20

## 2024-05-20 RX ORDER — ASPIRIN 81 MG/1
81 TABLET ORAL DAILY
COMMUNITY

## 2024-05-20 RX ORDER — ALLOPURINOL 100 MG/1
100 TABLET ORAL
COMMUNITY
Start: 2024-03-19

## 2024-05-20 RX ORDER — LORATADINE 10 MG/1
10 TABLET ORAL DAILY
COMMUNITY

## 2024-05-20 NOTE — PROGRESS NOTES
seen.  There is reversal of E/A inflow velocities across the mitral valve  suggesting impaired left ventricular relaxation.  Aortic valve appears sclerotic but opens adequately.  Trivial mitral and mild pulmonic regurgitation is present.  The left atrium is at the upper limits of normal in size.     [facility] 2011: - LAD 75% with flow wire 0.87, D-2 95%, RCA prox 30%, treated medically.            Last EC2024 SR with RBBB reviewed by me     Assessment:         1. Coronary artery disease          On ASA, Lisinopril, and statin          Patient denies any chest pain or SOB, Today's EKG no change           Plan: Change Atorvastatin to Rosuvastatin         2. Hypertension          Today's B/P- 130/62           On Lisinopril 20 mg BID           Plan: continue medication         3. Hyperlipidemia          Total: 112, HDL: 30, LDL: 58          On Atorvastatin 40 mg daily          Plan: D/C Atorvastatin, Add Rosuvastatin 20 mg daily    Plan: D/C Atorvastatin          Add Rosuvastatin 20 mg daily          Order Lipid panel, CBC, CMP, Sed Rate, and Stool Occult          Order Lipid panel for 6-8 weeks to be drawn          Patient decline a Myoview Stress Test or ECHO Today           Follow up in six months/ or as needed         I have addressed the patient's cardiac risk factors and adjusted pharmacologic treatment as needed. In addition, I have reinforced the need for patient directed risk factor modification.    Further evaluation will be based upon the patient's clinical course and testing results.    All questions and concerns were addressed to the patient/family. Alternatives to  treatment were discussed.     Daily weight, low sodium diet were discussed. Patient instructed to call the office with a weight gain: > 3 lbs over night or 5 lbs in one week; swelling, SOB/orthopnea/PND    Pt is on an ace-i/ARB: Lisinopril  Pt is on a statin    Saturated fat diet discussed  Exercise program discussed    Thank you for

## 2024-05-21 ENCOUNTER — OFFICE VISIT (OUTPATIENT)
Dept: FAMILY MEDICINE CLINIC | Age: 84
End: 2024-05-21
Payer: MEDICARE

## 2024-05-21 VITALS
WEIGHT: 163 LBS | SYSTOLIC BLOOD PRESSURE: 138 MMHG | OXYGEN SATURATION: 98 % | BODY MASS INDEX: 26.2 KG/M2 | DIASTOLIC BLOOD PRESSURE: 72 MMHG | HEIGHT: 66 IN | HEART RATE: 108 BPM

## 2024-05-21 DIAGNOSIS — D64.9 ANEMIA, UNSPECIFIED TYPE: ICD-10-CM

## 2024-05-21 DIAGNOSIS — E11.65 TYPE 2 DIABETES MELLITUS WITH HYPERGLYCEMIA, WITHOUT LONG-TERM CURRENT USE OF INSULIN (HCC): Primary | ICD-10-CM

## 2024-05-21 DIAGNOSIS — R53.83 FATIGUE, UNSPECIFIED TYPE: ICD-10-CM

## 2024-05-21 PROCEDURE — 3078F DIAST BP <80 MM HG: CPT | Performed by: FAMILY MEDICINE

## 2024-05-21 PROCEDURE — 3075F SYST BP GE 130 - 139MM HG: CPT | Performed by: FAMILY MEDICINE

## 2024-05-21 PROCEDURE — 1123F ACP DISCUSS/DSCN MKR DOCD: CPT | Performed by: FAMILY MEDICINE

## 2024-05-21 PROCEDURE — 99214 OFFICE O/P EST MOD 30 MIN: CPT | Performed by: FAMILY MEDICINE

## 2024-05-21 PROCEDURE — 3052F HG A1C>EQUAL 8.0%<EQUAL 9.0%: CPT | Performed by: FAMILY MEDICINE

## 2024-05-21 ASSESSMENT — ENCOUNTER SYMPTOMS: RESPIRATORY NEGATIVE: 1

## 2024-05-21 NOTE — PROGRESS NOTES
London Alcocer (:  1940) is a 83 y.o. male,Established patient, here for evaluation of the following chief complaint(s):  Follow-up and Fatigue (Past month)      Assessment & Plan   ASSESSMENT/PLAN:  London was seen today for follow-up and fatigue.    Diagnoses and all orders for this visit:    Type 2 diabetes mellitus with hyperglycemia, without long-term current use of insulin (HCC)  -     Hemoglobin A1C; Future  Not well controlled. Recheck to see improved with diet  Anemia, unspecified type  -     CBC with Auto Differential; Future  -     Ferritin; Future  -     POCT Fecal Immunochemical Test (FIT); Future  Recheck to evaluate  Fatigue, unspecified type  Consider sleep study if blood work ok       No follow-ups on file.         Subjective   SUBJECTIVE/OBJECTIVE:  HPI  Glen is a of 83 y.o. male comes in today with   Chief Complaint   Patient presents with    Follow-up    Fatigue     Past month     follow up diabetes.  Diet better recently.    More fatigued, wears out quicker.  Saw cardiology y/d.    Review of Systems   Respiratory: Negative.     Cardiovascular: Negative.           Objective   Physical Exam  Constitutional:       Appearance: Normal appearance.   Cardiovascular:      Rate and Rhythm: Normal rate and regular rhythm.   Pulmonary:      Effort: Pulmonary effort is normal.      Breath sounds: Normal breath sounds.   Neurological:      Mental Status: He is alert.              An electronic signature was used to authenticate this note.    --Jorge Rubio MD

## 2024-05-22 DIAGNOSIS — E78.49 OTHER HYPERLIPIDEMIA: ICD-10-CM

## 2024-05-22 DIAGNOSIS — I10 ESSENTIAL HYPERTENSION: ICD-10-CM

## 2024-05-22 DIAGNOSIS — D64.9 ANEMIA, UNSPECIFIED TYPE: ICD-10-CM

## 2024-05-22 DIAGNOSIS — I25.10 ASHD (ARTERIOSCLEROTIC HEART DISEASE): ICD-10-CM

## 2024-05-22 DIAGNOSIS — E11.65 TYPE 2 DIABETES MELLITUS WITH HYPERGLYCEMIA, WITHOUT LONG-TERM CURRENT USE OF INSULIN (HCC): ICD-10-CM

## 2024-05-22 LAB
BASOPHILS # BLD: 0.1 K/UL (ref 0–0.2)
BASOPHILS NFR BLD: 1.3 %
DEPRECATED RDW RBC AUTO: 22.6 % (ref 12.4–15.4)
EOSINOPHIL # BLD: 0.3 K/UL (ref 0–0.6)
EOSINOPHIL NFR BLD: 3.1 %
ERYTHROCYTE [SEDIMENTATION RATE] IN BLOOD BY WESTERGREN METHOD: 5 MM/HR (ref 0–20)
FERRITIN SERPL IA-MCNC: 153.9 NG/ML (ref 30–400)
HCT VFR BLD AUTO: 34.2 % (ref 40.5–52.5)
HGB BLD-MCNC: 11.7 G/DL (ref 13.5–17.5)
LYMPHOCYTES # BLD: 2.4 K/UL (ref 1–5.1)
LYMPHOCYTES NFR BLD: 27.5 %
MCH RBC QN AUTO: 32.2 PG (ref 26–34)
MCHC RBC AUTO-ENTMCNC: 34.3 G/DL (ref 31–36)
MCV RBC AUTO: 94 FL (ref 80–100)
MONOCYTES # BLD: 0.9 K/UL (ref 0–1.3)
MONOCYTES NFR BLD: 10.4 %
NEUTROPHILS # BLD: 5.1 K/UL (ref 1.7–7.7)
NEUTROPHILS NFR BLD: 57.7 %
PLATELET # BLD AUTO: 220 K/UL (ref 135–450)
PMV BLD AUTO: 8 FL (ref 5–10.5)
RBC # BLD AUTO: 3.63 M/UL (ref 4.2–5.9)
WBC # BLD AUTO: 8.9 K/UL (ref 4–11)

## 2024-05-23 LAB
ALBUMIN SERPL-MCNC: 4 G/DL (ref 3.4–5)
ALBUMIN/GLOB SERPL: 1.8 {RATIO} (ref 1.1–2.2)
ALP SERPL-CCNC: 67 U/L (ref 40–129)
ALT SERPL-CCNC: 17 U/L (ref 10–40)
ANION GAP SERPL CALCULATED.3IONS-SCNC: 12 MMOL/L (ref 3–16)
AST SERPL-CCNC: 19 U/L (ref 15–37)
BILIRUB SERPL-MCNC: 0.5 MG/DL (ref 0–1)
BUN SERPL-MCNC: 26 MG/DL (ref 7–20)
CALCIUM SERPL-MCNC: 9.2 MG/DL (ref 8.3–10.6)
CHLORIDE SERPL-SCNC: 103 MMOL/L (ref 99–110)
CHOLEST SERPL-MCNC: 119 MG/DL (ref 0–199)
CO2 SERPL-SCNC: 23 MMOL/L (ref 21–32)
CREAT SERPL-MCNC: 1.2 MG/DL (ref 0.8–1.3)
EST. AVERAGE GLUCOSE BLD GHB EST-MCNC: 145.6 MG/DL
GFR SERPLBLD CREATININE-BSD FMLA CKD-EPI: 60 ML/MIN/{1.73_M2}
GLUCOSE SERPL-MCNC: 101 MG/DL (ref 70–99)
HBA1C MFR BLD: 6.7 %
HDLC SERPL-MCNC: 41 MG/DL (ref 40–60)
LDL CHOLESTEROL: 66 MG/DL
POTASSIUM SERPL-SCNC: 5 MMOL/L (ref 3.5–5.1)
PROT SERPL-MCNC: 6.2 G/DL (ref 6.4–8.2)
SODIUM SERPL-SCNC: 138 MMOL/L (ref 136–145)
TRIGL SERPL-MCNC: 62 MG/DL (ref 0–150)
VLDLC SERPL CALC-MCNC: 12 MG/DL

## 2024-05-28 LAB — FECAL BLOOD IMMUNOCHEMICAL TEST: POSITIVE

## 2024-05-31 ENCOUNTER — TELEPHONE (OUTPATIENT)
Dept: FAMILY MEDICINE CLINIC | Age: 84
End: 2024-05-31

## 2024-05-31 DIAGNOSIS — D64.9 ANEMIA, UNSPECIFIED TYPE: Primary | ICD-10-CM

## 2024-05-31 NOTE — TELEPHONE ENCOUNTER
Mild anemia but not iron deficient.  Hga1c looks much better  Blood was present in stool however so recommend follow up with GI. I can place a referral to [facility] unless he has another preference

## 2024-05-31 NOTE — TELEPHONE ENCOUNTER
Pt called requesting to have his lab and stool test results.  Please have Dr. Rubio result on and advise Dr. Rubio, today, if possible.

## 2024-05-31 NOTE — TELEPHONE ENCOUNTER
Referral has been made          Referred By: Jorge Rubio MD NPI: [id number]   Referral Reason: Specialty Services Required         Referred To:                Beatris Siddiqi  [address]  [address]  [place] [id number] Loc/POS:                Phone:   [phone] Phone:     Fax:   [phone] Fax:

## 2024-06-24 LAB — DIABETIC RETINOPATHY: NEGATIVE

## 2024-07-05 RX ORDER — BLOOD SUGAR DIAGNOSTIC
STRIP MISCELLANEOUS
Qty: 100 STRIP | Refills: 1 | Status: SHIPPED | OUTPATIENT
Start: 2024-07-05

## 2024-07-05 NOTE — TELEPHONE ENCOUNTER
Medication:   Requested Prescriptions     Pending Prescriptions Disp Refills    ONETOUCH ULTRA strip [Pharmacy Med Name: ONETOUCH ULTRA  STRP] 100 strip 1     Sig: USE TO TEST BLOOD SUGAR TWO TIMES A DAY       Last Filled:  4/3/24    Patient Phone Number: [phone] (home)     Last appt: 5/21/2024   Next appt: Visit date not found    Last Labs DM:   Lab Results   Component Value Date/Time    LABA1C 6.7 05/22/2024 08:14 AM     Last Lipid:   Lab Results   Component Value Date/Time    CHOL 154 11/21/2022 08:55 AM    TRIG 151 11/21/2022 08:55 AM    HDL 41 05/22/2024 08:14 AM    HDL 31 05/10/2012 09:05 AM     Last PSA:   Lab Results   Component Value Date/Time    PSA 1.74 03/05/2024 09:32 AM     Last Thyroid:   Lab Results   Component Value Date/Time    TSH 0.59 03/05/2024 09:32 AM    TSH 1.25 05/31/2014 05:38 AM    T4FREE 1.2 07/30/2018 08:32 AM

## 2024-07-08 ENCOUNTER — TELEPHONE (OUTPATIENT)
Dept: FAMILY MEDICINE CLINIC | Age: 84
End: 2024-07-08

## 2024-07-08 NOTE — TELEPHONE ENCOUNTER
He needs a colonoscopy. Referral was previously placed for this but maybe he didn't get that message

## 2024-07-09 NOTE — TELEPHONE ENCOUNTER
From report looks like follow up with GI was recommended a month or two after the scope to reevaluate anemia/wt loss

## 2024-08-07 ENCOUNTER — OFFICE VISIT (OUTPATIENT)
Dept: FAMILY MEDICINE CLINIC | Age: 84
End: 2024-08-07
Payer: MEDICARE

## 2024-08-07 VITALS
BODY MASS INDEX: 26.2 KG/M2 | WEIGHT: 163 LBS | SYSTOLIC BLOOD PRESSURE: 138 MMHG | HEART RATE: 87 BPM | DIASTOLIC BLOOD PRESSURE: 76 MMHG | HEIGHT: 66 IN | OXYGEN SATURATION: 96 %

## 2024-08-07 DIAGNOSIS — E11.9 TYPE 2 DIABETES MELLITUS WITHOUT COMPLICATION, WITHOUT LONG-TERM CURRENT USE OF INSULIN (HCC): ICD-10-CM

## 2024-08-07 DIAGNOSIS — R53.83 FATIGUE, UNSPECIFIED TYPE: ICD-10-CM

## 2024-08-07 DIAGNOSIS — D64.9 ANEMIA, UNSPECIFIED TYPE: Primary | ICD-10-CM

## 2024-08-07 PROBLEM — E11.65 TYPE 2 DIABETES MELLITUS WITH HYPERGLYCEMIA (HCC): Status: RESOLVED | Noted: 2024-05-21 | Resolved: 2024-08-07

## 2024-08-07 PROCEDURE — 3075F SYST BP GE 130 - 139MM HG: CPT | Performed by: FAMILY MEDICINE

## 2024-08-07 PROCEDURE — 1123F ACP DISCUSS/DSCN MKR DOCD: CPT | Performed by: FAMILY MEDICINE

## 2024-08-07 PROCEDURE — 3044F HG A1C LEVEL LT 7.0%: CPT | Performed by: FAMILY MEDICINE

## 2024-08-07 PROCEDURE — 3078F DIAST BP <80 MM HG: CPT | Performed by: FAMILY MEDICINE

## 2024-08-07 PROCEDURE — 99214 OFFICE O/P EST MOD 30 MIN: CPT | Performed by: FAMILY MEDICINE

## 2024-08-07 ASSESSMENT — ENCOUNTER SYMPTOMS: RESPIRATORY NEGATIVE: 1

## 2024-08-07 NOTE — PROGRESS NOTES
London Alcocer (:  1940) is a 83 y.o. male,Established patient, here for evaluation of the following chief complaint(s):  Follow-up      Assessment & Plan   ASSESSMENT/PLAN:  London was seen today for follow-up.    Diagnoses and all orders for this visit:    Anemia, unspecified type  -     Ferritin; Future  -     Electrophoresis Protein, Serum; Future  -     PROTEIN ELECTROPHORESIS, URINE; Future  -     Urinalysis  -     Vitamin B12 & Folate; Future  -     Lactate Dehydrogenase; Future  -     CBC with Auto Differential; Future  Egd/colonoscopy were ok. Currently just on MVI silver  If anemia worse consider hematology referral pending above blood work   Type 2 diabetes mellitus without complication, without long-term current use of insulin (HCC)  well controlled. Not due for blood work yet.  Continue current meds   fatigue  Referral to sleep med if anemia improved  No follow-ups on file.         Subjective   SUBJECTIVE/OBJECTIVE:  HPI  Pt is a of 83 y.o. male comes in today with   Chief Complaint   Patient presents with    Follow-up   Fighting fatigue.  Feels like sleep is good.  Sleep not restorative.    Vitals:    24 1006   BP: 138/76   Pulse: 87   SpO2: 96%   Weight: 73.9 kg (163 lb)   Height: 1.676 m (5' 6\")       Past Medical History:Reviewed  Medications:Reviewed.  No Known Allergies   Social hx:Reviewed.  Social History     Tobacco Use   Smoking Status Never   Smokeless Tobacco Never        Review of Systems   Respiratory: Negative.     Cardiovascular: Negative.           Objective   Physical Exam         An electronic signature was used to authenticate this note.    --Jorge Rubio MD

## 2024-08-08 DIAGNOSIS — D64.9 ANEMIA, UNSPECIFIED TYPE: ICD-10-CM

## 2024-08-08 LAB
BILIRUB UR QL STRIP.AUTO: NEGATIVE
CLARITY UR: CLEAR
COLOR UR: YELLOW
FERRITIN SERPL IA-MCNC: 200 NG/ML (ref 30–400)
FOLATE SERPL-MCNC: 14.83 NG/ML (ref 4.78–24.2)
GLUCOSE UR STRIP.AUTO-MCNC: NEGATIVE MG/DL
HGB UR QL STRIP.AUTO: NEGATIVE
KETONES UR STRIP.AUTO-MCNC: NEGATIVE MG/DL
LDH SERPL L TO P-CCNC: 221 U/L (ref 100–190)
LEUKOCYTE ESTERASE UR QL STRIP.AUTO: NEGATIVE
NITRITE UR QL STRIP.AUTO: NEGATIVE
PH UR STRIP.AUTO: 6 [PH] (ref 5–8)
PROT UR STRIP.AUTO-MCNC: NEGATIVE MG/DL
PROT UR-MCNC: 0.01 G/DL
PROT UR-MCNC: 14 MG/DL
SP GR UR STRIP.AUTO: 1.02 (ref 1–1.03)
UA DIPSTICK W REFLEX MICRO PNL UR: NORMAL
URN SPEC COLLECT METH UR: NORMAL
UROBILINOGEN UR STRIP-ACNC: 0.2 E.U./DL
VIT B12 SERPL-MCNC: 829 PG/ML (ref 211–911)

## 2024-08-09 LAB
ACANTHOCYTES BLD QL SMEAR: ABNORMAL
ALBUMIN SERPL ELPH-MCNC: 3.4 G/DL (ref 3.1–4.9)
ALPHA1 GLOB SERPL ELPH-MCNC: 0.2 G/DL (ref 0.2–0.4)
ALPHA2 GLOB SERPL ELPH-MCNC: 0.9 G/DL (ref 0.4–1.1)
B-GLOBULIN SERPL ELPH-MCNC: 1.1 G/DL (ref 0.9–1.6)
BASOPHILS # BLD: 0.1 K/UL (ref 0–0.2)
BASOPHILS NFR BLD: 1.5 %
BURR CELLS BLD QL SMEAR: ABNORMAL
DEPRECATED RDW RBC AUTO: 20.5 % (ref 12.4–15.4)
EOSINOPHIL # BLD: 0.3 K/UL (ref 0–0.6)
EOSINOPHIL NFR BLD: 3.3 %
GAMMA GLOB SERPL ELPH-MCNC: 0.9 G/DL (ref 0.6–1.8)
HCT VFR BLD AUTO: 32.1 % (ref 40.5–52.5)
HGB BLD-MCNC: 11.1 G/DL (ref 13.5–17.5)
LYMPHOCYTES # BLD: 2.4 K/UL (ref 1–5.1)
LYMPHOCYTES NFR BLD: 23.2 %
MCH RBC QN AUTO: 32.1 PG (ref 26–34)
MCHC RBC AUTO-ENTMCNC: 34.6 G/DL (ref 31–36)
MCV RBC AUTO: 92.8 FL (ref 80–100)
MONOCYTES # BLD: 0.9 K/UL (ref 0–1.3)
MONOCYTES NFR BLD: 8.9 %
NEUTROPHILS # BLD: 6.5 K/UL (ref 1.7–7.7)
NEUTROPHILS NFR BLD: 63.1 %
PLATELET # BLD AUTO: 223 K/UL (ref 135–450)
PMV BLD AUTO: 8.1 FL (ref 5–10.5)
PROT PATTERN UR ELPH-IMP: NORMAL
PROT SERPL-MCNC: 6.5 G/DL (ref 6.4–8.2)
RBC # BLD AUTO: 3.46 M/UL (ref 4.2–5.9)
SCHISTOCYTES BLD QL SMEAR: ABNORMAL
SPE/IFE INTERPRETATION: NORMAL
WBC # BLD AUTO: 10.2 K/UL (ref 4–11)

## 2024-08-11 DIAGNOSIS — D64.9 ANEMIA, UNSPECIFIED TYPE: Primary | ICD-10-CM

## 2024-10-02 RX ORDER — ROSUVASTATIN CALCIUM 20 MG/1
20 TABLET, COATED ORAL DAILY
Qty: 90 TABLET | Refills: 3 | Status: SHIPPED | OUTPATIENT
Start: 2024-10-02

## 2024-10-02 NOTE — TELEPHONE ENCOUNTER
Received refill request for rosuvastatin from [facility] pharmacy.    Last ov: 05/20/2024 KEL     Last Refill: 05/20/2024 #30 w/ 5    Next appointment: 12/09/2024 MARJORIE

## 2024-10-15 ENCOUNTER — OFFICE VISIT (OUTPATIENT)
Dept: FAMILY MEDICINE CLINIC | Age: 84
End: 2024-10-15
Payer: MEDICARE

## 2024-10-15 VITALS
BODY MASS INDEX: 26.36 KG/M2 | DIASTOLIC BLOOD PRESSURE: 72 MMHG | HEIGHT: 66 IN | HEART RATE: 90 BPM | OXYGEN SATURATION: 97 % | WEIGHT: 164 LBS | SYSTOLIC BLOOD PRESSURE: 136 MMHG

## 2024-10-15 DIAGNOSIS — E03.9 HYPOTHYROIDISM, UNSPECIFIED TYPE: ICD-10-CM

## 2024-10-15 DIAGNOSIS — M1A.09X0 IDIOPATHIC CHRONIC GOUT OF MULTIPLE SITES WITHOUT TOPHUS: ICD-10-CM

## 2024-10-15 DIAGNOSIS — E11.9 TYPE 2 DIABETES MELLITUS WITHOUT COMPLICATION, WITHOUT LONG-TERM CURRENT USE OF INSULIN (HCC): Primary | ICD-10-CM

## 2024-10-15 PROCEDURE — 3044F HG A1C LEVEL LT 7.0%: CPT | Performed by: FAMILY MEDICINE

## 2024-10-15 PROCEDURE — 3075F SYST BP GE 130 - 139MM HG: CPT | Performed by: FAMILY MEDICINE

## 2024-10-15 PROCEDURE — 1123F ACP DISCUSS/DSCN MKR DOCD: CPT | Performed by: FAMILY MEDICINE

## 2024-10-15 PROCEDURE — 99214 OFFICE O/P EST MOD 30 MIN: CPT | Performed by: FAMILY MEDICINE

## 2024-10-15 PROCEDURE — 3078F DIAST BP <80 MM HG: CPT | Performed by: FAMILY MEDICINE

## 2024-10-15 RX ORDER — ALLOPURINOL 100 MG/1
50 TABLET ORAL DAILY
Qty: 45 TABLET | Refills: 3 | Status: SHIPPED | OUTPATIENT
Start: 2024-10-15

## 2024-10-15 RX ORDER — COLCHICINE 0.6 MG/1
0.6 TABLET ORAL 2 TIMES DAILY PRN
Qty: 30 TABLET | Refills: 0 | Status: SHIPPED | OUTPATIENT
Start: 2024-10-15

## 2024-10-15 RX ORDER — COLCHICINE 0.6 MG/1
0.6 TABLET ORAL 2 TIMES DAILY PRN
COMMUNITY
Start: 2024-10-06 | End: 2024-10-15 | Stop reason: SDUPTHER

## 2024-10-15 RX ORDER — TRIAMCINOLONE ACETONIDE 1 MG/G
CREAM TOPICAL
Qty: 45 G | Refills: 2 | Status: SHIPPED | OUTPATIENT
Start: 2024-10-15

## 2024-10-15 NOTE — PROGRESS NOTES
London Alcocer (:  1940) is a 83 y.o. male,Established patient, here for evaluation of the following chief complaint(s):  Discuss Medications      Assessment & Plan   ASSESSMENT/PLAN:  London was seen today for discuss medications.    Diagnoses and all orders for this visit:    Type 2 diabetes mellitus without complication, without long-term current use of insulin (HCC)  -     Hemoglobin A1C; Future  -     Lipid Panel; Future  -     Comprehensive Metabolic Panel; Future  Dm is stable  The current medical regimen is effective;  continue present plan and medications.   Hypothyroidism, unspecified type  -     TSH; Future  Stable on levo  Idiopathic chronic gout of multiple sites without tophus  -     Uric Acid; Future  Stable on allopurinol  Other orders  -     allopurinol (ZYLOPRIM) 100 MG tablet; Take 0.5 tablets by mouth daily  -     colchicine (COLCRYS) 0.6 MG tablet; Take 1 tablet by mouth 2 times daily as needed (gout)  -     triamcinolone (KENALOG) 0.1 % cream; Apply topically 2 times daily.         No follow-ups on file.         Subjective   SUBJECTIVE/OBJECTIVE:  HPI  Pt is a of 83 y.o. male comes in today with   Chief Complaint   Patient presents with    Discuss Medications     Vitals:    10/15/24 1320   BP: 136/72   Pulse: 90   SpO2: 97%   Weight: 74.4 kg (164 lb)   Height: 1.676 m (5' 6\")       Review of Systems       Objective   Physical Exam         An electronic signature was used to authenticate this note.    --Jorge Rubio MD
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
## 2024-11-14 DIAGNOSIS — E11.9 TYPE 2 DIABETES MELLITUS WITHOUT COMPLICATION, WITHOUT LONG-TERM CURRENT USE OF INSULIN (HCC): ICD-10-CM

## 2024-11-14 DIAGNOSIS — M1A.09X0 IDIOPATHIC CHRONIC GOUT OF MULTIPLE SITES WITHOUT TOPHUS: ICD-10-CM

## 2024-11-14 DIAGNOSIS — E03.9 HYPOTHYROIDISM, UNSPECIFIED TYPE: ICD-10-CM

## 2024-11-15 LAB
ALBUMIN SERPL-MCNC: 4.3 G/DL (ref 3.4–5)
ALBUMIN/GLOB SERPL: 2 {RATIO} (ref 1.1–2.2)
ALP SERPL-CCNC: 62 U/L (ref 40–129)
ALT SERPL-CCNC: 20 U/L (ref 10–40)
ANION GAP SERPL CALCULATED.3IONS-SCNC: 9 MMOL/L (ref 3–16)
AST SERPL-CCNC: 25 U/L (ref 15–37)
BILIRUB SERPL-MCNC: 0.4 MG/DL (ref 0–1)
BUN SERPL-MCNC: 29 MG/DL (ref 7–20)
CALCIUM SERPL-MCNC: 9.6 MG/DL (ref 8.3–10.6)
CHLORIDE SERPL-SCNC: 104 MMOL/L (ref 99–110)
CHOLEST SERPL-MCNC: 109 MG/DL (ref 0–199)
CO2 SERPL-SCNC: 26 MMOL/L (ref 21–32)
CREAT SERPL-MCNC: 1.4 MG/DL (ref 0.8–1.3)
EST. AVERAGE GLUCOSE BLD GHB EST-MCNC: 145.6 MG/DL
GFR SERPLBLD CREATININE-BSD FMLA CKD-EPI: 50 ML/MIN/{1.73_M2}
GLUCOSE SERPL-MCNC: 145 MG/DL (ref 70–99)
HBA1C MFR BLD: 6.7 %
HDLC SERPL-MCNC: 38 MG/DL (ref 40–60)
LDLC SERPL CALC-MCNC: 56 MG/DL
POTASSIUM SERPL-SCNC: 4.9 MMOL/L (ref 3.5–5.1)
PROT SERPL-MCNC: 6.4 G/DL (ref 6.4–8.2)
SODIUM SERPL-SCNC: 139 MMOL/L (ref 136–145)
TRIGL SERPL-MCNC: 76 MG/DL (ref 0–150)
TSH SERPL DL<=0.005 MIU/L-ACNC: 0.34 UIU/ML (ref 0.27–4.2)
URATE SERPL-MCNC: 6.3 MG/DL (ref 3.5–7.2)
VLDLC SERPL CALC-MCNC: 15 MG/DL

## 2024-11-25 NOTE — PATIENT INSTRUCTIONS
[phone] to schedule testing  ([facility])   For the PFTs - pulmonary lung function test  Chest xray when you can

## 2024-12-04 RX ORDER — GLIPIZIDE AND METFORMIN HCL 5; 500 MG/1; MG/1
TABLET, FILM COATED ORAL
Qty: 180 TABLET | Refills: 3 | Status: SHIPPED | OUTPATIENT
Start: 2024-12-04

## 2024-12-04 NOTE — TELEPHONE ENCOUNTER
Medication:   Requested Prescriptions     Pending Prescriptions Disp Refills    glipiZIDE-metFORMIN (METAGLIP) 5-500 MG per tablet [Pharmacy Med Name: GLIPIZIDE-METFORMIN HCL 5-500 TABS] 180 tablet 3     Sig: TAKE ONE TABLET BY MOUTH TWICE A DAY WITH MEALS       Last Filled:  2/2/24    Patient Phone Number: [phone] (home)     Last appt: 10/15/2024   Next appt: 4/15/2025    Last Labs DM:   Lab Results   Component Value Date/Time    LABA1C 6.7 11/14/2024 08:52 AM     Last Lipid:   Lab Results   Component Value Date/Time    CHOL 109 11/14/2024 08:52 AM    TRIG 76 11/14/2024 08:52 AM    HDL 38 11/14/2024 08:52 AM    HDL 31 05/10/2012 09:05 AM     Last PSA:   Lab Results   Component Value Date/Time    PSA 1.74 03/05/2024 09:32 AM     Last Thyroid:   Lab Results   Component Value Date/Time    TSH 0.34 11/14/2024 08:52 AM    TSH 1.25 05/31/2014 05:38 AM    T4FREE 1.2 07/30/2018 08:32 AM

## 2024-12-09 ENCOUNTER — OFFICE VISIT (OUTPATIENT)
Age: 84
End: 2024-12-09

## 2024-12-09 ENCOUNTER — HOSPITAL ENCOUNTER (OUTPATIENT)
Age: 84
Discharge: HOME | End: 2024-12-09
Payer: MEDICARE

## 2024-12-09 ENCOUNTER — TELEPHONE (OUTPATIENT)
Dept: CARDIOLOGY CLINIC | Age: 84
End: 2024-12-09

## 2024-12-09 VITALS
WEIGHT: 164.02 LBS | OXYGEN SATURATION: 98 % | HEIGHT: 66 IN | BODY MASS INDEX: 26.36 KG/M2 | DIASTOLIC BLOOD PRESSURE: 68 MMHG | HEART RATE: 59 BPM | SYSTOLIC BLOOD PRESSURE: 132 MMHG

## 2024-12-09 DIAGNOSIS — I10 ESSENTIAL HYPERTENSION: Primary | ICD-10-CM

## 2024-12-09 DIAGNOSIS — I25.10 CORONARY ARTERY DISEASE DUE TO LIPID RICH PLAQUE: ICD-10-CM

## 2024-12-09 DIAGNOSIS — R09.89 LUNG CRACKLES: ICD-10-CM

## 2024-12-09 DIAGNOSIS — E78.49 OTHER HYPERLIPIDEMIA: ICD-10-CM

## 2024-12-09 DIAGNOSIS — I25.83 CORONARY ARTERY DISEASE DUE TO LIPID RICH PLAQUE: ICD-10-CM

## 2024-12-09 DIAGNOSIS — I45.10 RBBB: ICD-10-CM

## 2024-12-09 PROCEDURE — 71046 X-RAY EXAM CHEST 2 VIEWS: CPT

## 2024-12-09 NOTE — TELEPHONE ENCOUNTER
Patient was seen by [facility] today, please call patient later and let him know that [facility] wants him to update his echo NEXT year with his office visit.   I believe he already scheduled it if you can coordinate the echo with his yearly f/u.     Thank you.     He just left the office so try later this afternoon.

## 2024-12-11 NOTE — TELEPHONE ENCOUNTER
Medication:   Requested Prescriptions     Pending Prescriptions Disp Refills    levothyroxine (SYNTHROID) 88 MCG tablet [Pharmacy Med Name: SYNTHROID 88MCG TABS] 90 tablet 3     Sig: TAKE ONE TABLET BY MOUTH ONCE DAILY       Last Filled:  10/18/2024     Patient Phone Number: [phone] (home)     Last appt: 10/15/2024   Next appt: 4/15/2025    Last Thyroid:   Lab Results   Component Value Date/Time    TSH 0.34 11/14/2024 08:52 AM    TSH 1.25 05/31/2014 05:38 AM    T4FREE 1.2 07/30/2018 08:32 AM

## 2024-12-12 RX ORDER — LEVOTHYROXINE SODIUM 88 UG/1
TABLET ORAL
Qty: 90 TABLET | Refills: 3 | Status: SHIPPED | OUTPATIENT
Start: 2024-12-12

## 2024-12-17 ENCOUNTER — TELEPHONE (OUTPATIENT)
Dept: CARDIOLOGY CLINIC | Age: 84
End: 2024-12-17

## 2024-12-17 NOTE — TELEPHONE ENCOUNTER
The patient phoned asking if [facility] had seen the results of his chest xray that he had on 12/09/24 and would like to discuss.  Thank you

## 2024-12-17 NOTE — TELEPHONE ENCOUNTER
Spoke to pt and relayed result message per KJC/AHRN. Pt verbalized understanding. Pt states he will his PCP regarding Chest CT.    Per Result note :    Dr Yee looked at the xray, it looks completely normal.  You may need a CT of the chest to identify why the crackles are present. Do you want to reach out to Dr Rubio to see what he thinks?    Leydi MULLIGAN

## 2025-01-02 ENCOUNTER — TELEPHONE (OUTPATIENT)
Dept: FAMILY MEDICINE CLINIC | Age: 85
End: 2025-01-02

## 2025-01-02 DIAGNOSIS — R09.89 BIBASILAR CRACKLES: Primary | ICD-10-CM

## 2025-01-02 NOTE — TELEPHONE ENCOUNTER
Pt called states his  Cardiologist Dr Yee   Wants Dr Rubio to get a CT scan for pt     Please call pt when complete

## 2025-01-03 RX ORDER — ATORVASTATIN CALCIUM 40 MG/1
TABLET, FILM COATED ORAL
Qty: 90 TABLET | Refills: 0 | Status: SHIPPED | OUTPATIENT
Start: 2025-01-03

## 2025-01-03 RX ORDER — BLOOD SUGAR DIAGNOSTIC
STRIP MISCELLANEOUS
Qty: 100 STRIP | Refills: 1 | Status: SHIPPED | OUTPATIENT
Start: 2025-01-03

## 2025-01-03 NOTE — TELEPHONE ENCOUNTER
Medication:   Requested Prescriptions     Pending Prescriptions Disp Refills    ONETOUCH ULTRA strip [Pharmacy Med Name: ONETOUCH ULTRA  STRP] 100 strip 1     Sig: USE TO TEST BLOOD SUGAR TWO TIMES A DAY       Last Filled:  7/5/24    Patient Phone Number: [phone] (home)     Last appt: 10/15/2024   Next appt: 4/15/2025    Last Labs DM:   Lab Results   Component Value Date/Time    LABA1C 6.7 11/14/2024 08:52 AM     Last Lipid:   Lab Results   Component Value Date/Time    CHOL 109 11/14/2024 08:52 AM    TRIG 76 11/14/2024 08:52 AM    HDL 38 11/14/2024 08:52 AM    HDL 31 05/10/2012 09:05 AM     Last PSA:   Lab Results   Component Value Date/Time    PSA 1.74 03/05/2024 09:32 AM     Last Thyroid:   Lab Results   Component Value Date/Time    TSH 0.34 11/14/2024 08:52 AM    TSH 1.25 05/31/2014 05:38 AM    T4FREE 1.2 07/30/2018 08:32 AM

## 2025-01-07 RX ORDER — ATORVASTATIN CALCIUM 40 MG/1
40 TABLET, FILM COATED ORAL DAILY
Qty: 90 TABLET | Refills: 1 | Status: SHIPPED | OUTPATIENT
Start: 2025-01-07

## 2025-01-07 NOTE — TELEPHONE ENCOUNTER
Signature on prescription was requested to be update per [facility]. Updated script please sign and send.

## 2025-01-13 RX ORDER — LISINOPRIL 20 MG/1
20 TABLET ORAL 2 TIMES DAILY
Qty: 180 TABLET | Refills: 3 | Status: SHIPPED | OUTPATIENT
Start: 2025-01-13

## 2025-01-13 NOTE — TELEPHONE ENCOUNTER
Medication:   Requested Prescriptions     Pending Prescriptions Disp Refills    lisinopril (PRINIVIL;ZESTRIL) 20 MG tablet [Pharmacy Med Name: LISINOPRIL 20MG TABS] 180 tablet 3     Sig: TAKE 1 TABLET BY MOUTH 2 TIMES DAILY        Last Filled:  02/02/24    Patient Phone Number: [phone] (home)     Last appt: 10/15/2024   Next appt: 4/15/2025    Last OARRS:       6/9/2023    10:24 AM   RX Monitoring   Periodic Controlled Substance Monitoring Possible medication side effects, risk of tolerance/dependence & alternative treatments discussed.;No signs of potential drug abuse or diversion identified.

## 2025-01-24 ENCOUNTER — TELEPHONE (OUTPATIENT)
Dept: CARDIOLOGY CLINIC | Age: 85
End: 2025-01-24

## 2025-01-27 RX ORDER — BLOOD SUGAR DIAGNOSTIC
STRIP MISCELLANEOUS
Qty: 100 STRIP | Refills: 1 | Status: SHIPPED | OUTPATIENT
Start: 2025-01-27

## 2025-01-27 NOTE — TELEPHONE ENCOUNTER
Medication:   Requested Prescriptions     Pending Prescriptions Disp Refills    ONETOUCH ULTRA strip [Pharmacy Med Name: ONETOUCH ULTRA  STRP] 100 strip 1     Sig: USE TO TEST BLOOD SUGAR TWO TIMES A DAY        Last Filled:      Patient Phone Number: [phone] (home)     Last appt: 10/15/2024   Next appt: 4/15/2025    Last OARRS:       6/9/2023    10:24 AM   RX Monitoring   Periodic Controlled Substance Monitoring Possible medication side effects, risk of tolerance/dependence & alternative treatments discussed.;No signs of potential drug abuse or diversion identified.

## 2025-01-28 RX ORDER — ROSUVASTATIN CALCIUM 20 MG/1
20 TABLET, COATED ORAL DAILY
Qty: 90 TABLET | Refills: 3 | Status: SHIPPED | OUTPATIENT
Start: 2025-01-28

## 2025-01-28 NOTE — TELEPHONE ENCOUNTER
Last ov:24 KJC  Next ov:25 KJC  Last EK24  Last labs:24  Last filled:   Disp Refills Start End    rosuvastatin (CRESTOR) 20 MG tablet 90 tablet 3 10/2/2024 --    Sig - Route: TAKE ONE TABLET BY MOUTH EVERY DAY - Oral    Sent to pharmacy as: Rosuvastatin Calcium 20 MG Oral Tablet (CRESTOR)    Cosign for Ordering: Accepted by Sania Schneider APRN - CNP on 2025  1:23 PM    E-Prescribing Status: Receipt confirmed by pharmacy (10/2/2024 11:21 AM EDT)

## 2025-01-29 DIAGNOSIS — I25.83 CORONARY ARTERY DISEASE DUE TO LIPID RICH PLAQUE: Primary | ICD-10-CM

## 2025-01-29 DIAGNOSIS — I25.10 CORONARY ARTERY DISEASE DUE TO LIPID RICH PLAQUE: Primary | ICD-10-CM

## 2025-01-29 RX ORDER — SODIUM CHLORIDE 0.9 % (FLUSH) 0.9 %
5-40 SYRINGE (ML) INJECTION PRN
OUTPATIENT
Start: 2025-01-29

## 2025-01-29 RX ORDER — NITROGLYCERIN 0.4 MG/1
0.4 TABLET SUBLINGUAL
OUTPATIENT
Start: 2025-01-29 | End: 2025-01-30

## 2025-01-29 RX ORDER — SODIUM CHLORIDE 9 MG/ML
INJECTION, SOLUTION INTRAVENOUS PRN
OUTPATIENT
Start: 2025-01-29

## 2025-01-29 RX ORDER — METOPROLOL TARTRATE 50 MG
TABLET ORAL
Qty: 3 TABLET | Refills: 0 | Status: SHIPPED | OUTPATIENT
Start: 2025-01-29

## 2025-01-29 RX ORDER — NITROGLYCERIN 0.4 MG/1
0.8 TABLET SUBLINGUAL
OUTPATIENT
Start: 2025-01-29 | End: 2025-01-30

## 2025-01-29 RX ORDER — METOPROLOL TARTRATE 1 MG/ML
5 INJECTION, SOLUTION INTRAVENOUS EVERY 5 MIN PRN
OUTPATIENT
Start: 2025-01-29

## 2025-01-29 RX ORDER — SODIUM CHLORIDE 0.9 % (FLUSH) 0.9 %
5-40 SYRINGE (ML) INJECTION EVERY 12 HOURS SCHEDULED
OUTPATIENT
Start: 2025-01-29

## 2025-02-05 ENCOUNTER — HOSPITAL ENCOUNTER (OUTPATIENT)
Dept: CT IMAGING | Age: 85
Discharge: HOME | End: 2025-02-05
Attending: INTERNAL MEDICINE
Payer: MEDICARE

## 2025-02-05 VITALS — SYSTOLIC BLOOD PRESSURE: 124 MMHG | HEART RATE: 59 BPM | DIASTOLIC BLOOD PRESSURE: 67 MMHG

## 2025-02-05 DIAGNOSIS — I25.83 CORONARY ARTERY DISEASE DUE TO LIPID RICH PLAQUE: ICD-10-CM

## 2025-02-05 DIAGNOSIS — I25.10 CORONARY ARTERY DISEASE DUE TO LIPID RICH PLAQUE: ICD-10-CM

## 2025-02-05 PROCEDURE — 6370000000 HC RX 637 (ALT 250 FOR IP): Performed by: RADIOLOGY

## 2025-02-05 PROCEDURE — 6360000004 HC RX CONTRAST MEDICATION: Performed by: INTERNAL MEDICINE

## 2025-02-05 PROCEDURE — 75574 CT ANGIO HRT W/3D IMAGE: CPT

## 2025-02-05 RX ORDER — SODIUM CHLORIDE 0.9 % (FLUSH) 0.9 %
5-40 SYRINGE (ML) INJECTION EVERY 12 HOURS SCHEDULED
Status: DISCONTINUED | OUTPATIENT
Start: 2025-02-05 | End: 2025-02-06 | Stop reason: HOSPADM

## 2025-02-05 RX ORDER — SODIUM CHLORIDE 9 MG/ML
INJECTION, SOLUTION INTRAVENOUS PRN
Status: DISCONTINUED | OUTPATIENT
Start: 2025-02-05 | End: 2025-02-06 | Stop reason: HOSPADM

## 2025-02-05 RX ORDER — METOPROLOL TARTRATE 50 MG
50 TABLET ORAL PRN
Status: DISCONTINUED | OUTPATIENT
Start: 2025-02-05 | End: 2025-02-06 | Stop reason: HOSPADM

## 2025-02-05 RX ORDER — SODIUM CHLORIDE 0.9 % (FLUSH) 0.9 %
5-40 SYRINGE (ML) INJECTION PRN
Status: DISCONTINUED | OUTPATIENT
Start: 2025-02-05 | End: 2025-02-06 | Stop reason: HOSPADM

## 2025-02-05 RX ORDER — METOPROLOL TARTRATE 100 MG/1
100 TABLET ORAL PRN
Status: DISCONTINUED | OUTPATIENT
Start: 2025-02-05 | End: 2025-02-06 | Stop reason: HOSPADM

## 2025-02-05 RX ORDER — METOPROLOL TARTRATE 1 MG/ML
5 INJECTION, SOLUTION INTRAVENOUS EVERY 5 MIN PRN
Status: DISCONTINUED | OUTPATIENT
Start: 2025-02-05 | End: 2025-02-06 | Stop reason: HOSPADM

## 2025-02-05 RX ORDER — NITROGLYCERIN 0.4 MG/1
0.4 TABLET SUBLINGUAL PRN
Status: COMPLETED | OUTPATIENT
Start: 2025-02-05 | End: 2025-02-05

## 2025-02-05 RX ORDER — IOPAMIDOL 755 MG/ML
100 INJECTION, SOLUTION INTRAVASCULAR
Status: COMPLETED | OUTPATIENT
Start: 2025-02-05 | End: 2025-02-05

## 2025-02-05 RX ORDER — NITROGLYCERIN 0.4 MG/1
0.8 TABLET SUBLINGUAL PRN
Status: COMPLETED | OUTPATIENT
Start: 2025-02-05 | End: 2025-02-05

## 2025-02-05 RX ADMIN — NITROGLYCERIN 0.4 MG: 0.4 TABLET SUBLINGUAL at 14:43

## 2025-02-05 RX ADMIN — IOPAMIDOL 100 ML: 755 INJECTION, SOLUTION INTRAVENOUS at 15:39

## 2025-02-05 NOTE — PROGRESS NOTES
Pt here for Cardiac CTA test.  Administered 0.4mg nitroglycerin sublingual for exam.  Pt tolerated well.  VSS. See flow sheet.  Reviewed Cardiac CTA discharge instructions with pt - verbalized understanding, no further questions. Pt discharged to home.

## 2025-02-05 NOTE — DISCHARGE INSTRUCTIONS
Thank You for choosing [facility] for your medical care.    During your examination, you were given a Beta Blocker called Metopropol or Lopressor to help slow down your heart rate. The dose of the medication you were given was 1 sublingual nitroglycerin.    Although there are few side effects from this medication when given in small amounts (doses) it is important you follow a few important instructions:    Notify the doctor that ordered your test or go to the nearest emergency room if you experience any of the following:  difficulty breathing  dizziness  extreme fatigue  pounding or irregular heart beat    Continue your usual diet, increase fluids today.  (Four 8 ounce glasses of water).                    4.You may return back to your normal activity and routine tomorrow.                Test results will be sent to your Physician.    If you take Actoplus Met, Avandamet, Glucophage, Glucophage XR, Glucovance, Metformin, Metaglip, Riomet, or Fortmet hold medication for 48 hours after procedure and resum

## 2025-02-10 ENCOUNTER — HOSPITAL ENCOUNTER (OUTPATIENT)
Dept: CT IMAGING | Age: 85
Discharge: HOME | End: 2025-02-10
Payer: MEDICARE

## 2025-02-10 ENCOUNTER — TELEPHONE (OUTPATIENT)
Age: 85
End: 2025-02-10

## 2025-02-10 PROCEDURE — 75580 N-INVAS EST C FFR SW ALY CTA: CPT

## 2025-02-11 ENCOUNTER — TELEPHONE (OUTPATIENT)
Dept: CARDIOLOGY CLINIC | Age: 85
End: 2025-02-11

## 2025-02-11 DIAGNOSIS — I25.10 CORONARY ARTERY DISEASE DUE TO LIPID RICH PLAQUE: Primary | ICD-10-CM

## 2025-02-11 DIAGNOSIS — I25.83 CORONARY ARTERY DISEASE DUE TO LIPID RICH PLAQUE: Primary | ICD-10-CM

## 2025-02-11 DIAGNOSIS — R93.89 ABNORMAL COMPUTED TOMOGRAPHY ANGIOGRAPHY (CTA): ICD-10-CM

## 2025-02-11 NOTE — TELEPHONE ENCOUNTER
Results available now. Per C, recommend C as heart flow shows some possible progression to the LAD. Spoke with patient who is agreeable to procedure. He will have labs done at [facility] and wait for call to schedule. All questions answered.

## 2025-02-11 NOTE — TELEPHONE ENCOUNTER
Spoke with patient to let him know we are still waiting on heart flow results. I called [facility] radiology dept yesterday, [facility] is calling radiologist today to check on them. Pt v/u and will call back if he does not hear from me by tomorrow.

## 2025-02-11 NOTE — TELEPHONE ENCOUNTER
Date of Procedure: Thursday 2/20/25 @ [facility] with Dr. Yee     Time of arrival: 7:30 am     Procedure time: 9:00 am     Called and spoke to London and he is agreeable to date and time. Reviewed instructions and he verbalized understanding. Encouraged to call with any questions or concerns.     Published on S[facility], scheduled on [employer] and e-mailed to cath lab.

## 2025-02-11 NOTE — TELEPHONE ENCOUNTER
Left Heart Cath Patient Pre-procedure Instructions    Do NOT eat or drink anything after midnight morning of procedure    MEDICATIONS:  Your medications have been reviewed. Please follow medication instructions below  It is okay to drink a sip of water with meds morning of procedure  Hold Lisinopril the morning of your procedure     Transportation: Bring someone to drive you home.     Scheduling: Radha BROWN is our full time procedure . She will call you to schedule your procedure.    Allergies: Do you have an allergy to dye or contrast? No.    Labs: We need to check blood work within 30 days of your procedure (CBC & BMP). Please go to any [facility] lab to get your labs drawn prior to your procedure.     Please schedule LHC with KJC. Pt aware and given instructions but waiting for call.

## 2025-02-17 ENCOUNTER — HOSPITAL ENCOUNTER (OUTPATIENT)
Age: 85
Discharge: HOME | End: 2025-02-17
Payer: MEDICARE

## 2025-02-17 DIAGNOSIS — I25.83 CORONARY ARTERY DISEASE DUE TO LIPID RICH PLAQUE: ICD-10-CM

## 2025-02-17 DIAGNOSIS — I25.10 CORONARY ARTERY DISEASE DUE TO LIPID RICH PLAQUE: ICD-10-CM

## 2025-02-17 LAB
ANION GAP SERPL CALCULATED.3IONS-SCNC: 10 MMOL/L (ref 3–16)
BUN SERPL-MCNC: 21 MG/DL (ref 7–20)
CALCIUM SERPL-MCNC: 9.5 MG/DL (ref 8.3–10.6)
CHLORIDE SERPL-SCNC: 105 MMOL/L (ref 99–110)
CO2 SERPL-SCNC: 25 MMOL/L (ref 21–32)
CREAT SERPL-MCNC: 1.3 MG/DL (ref 0.8–1.3)
DEPRECATED RDW RBC AUTO: 24 % (ref 12.4–15.4)
GFR SERPLBLD CREATININE-BSD FMLA CKD-EPI: 54 ML/MIN/{1.73_M2}
GLUCOSE SERPL-MCNC: 74 MG/DL (ref 70–99)
HCT VFR BLD AUTO: 32.7 % (ref 40.5–52.5)
HGB BLD-MCNC: 10.9 G/DL (ref 13.5–17.5)
MCH RBC QN AUTO: 30.7 PG (ref 26–34)
MCHC RBC AUTO-ENTMCNC: 33.5 G/DL (ref 31–36)
MCV RBC AUTO: 91.6 FL (ref 80–100)
PLATELET # BLD AUTO: 233 K/UL (ref 135–450)
PMV BLD AUTO: 8.2 FL (ref 5–10.5)
POTASSIUM SERPL-SCNC: 4.5 MMOL/L (ref 3.5–5.1)
RBC # BLD AUTO: 3.57 M/UL (ref 4.2–5.9)
SODIUM SERPL-SCNC: 140 MMOL/L (ref 136–145)
WBC # BLD AUTO: 9.9 K/UL (ref 4–11)

## 2025-02-17 PROCEDURE — 36415 COLL VENOUS BLD VENIPUNCTURE: CPT

## 2025-02-17 PROCEDURE — 85027 COMPLETE CBC AUTOMATED: CPT

## 2025-02-17 PROCEDURE — 80048 BASIC METABOLIC PNL TOTAL CA: CPT

## 2025-02-19 NOTE — PRE SEDATION
Brief Pre-Op Note/Sedation Assessment      London Alcocer  1940  [id number]  8:56 AM    Planned Procedure: Cardiac Catheterization Procedure  Post Procedure Plan: Return to same level of care  Consent: I have discussed with the patient and/or the patient representative the indication, alternatives, and the possible risks and/or complications of the planned procedure and the anesthesia methods. The patient and/or patient representative appear to understand and agree to proceed.        Chief Complaint:   Fatigue      Indications for Cath Procedure:  Presentation:  Other abnormal cardiac CTA   2.  Anginal Classification within 2 weeks:  CCS II - Slight limitation, with angina only during vigorous physical activity  3.  Angina Symptoms Assessment:  Asymptomatic  4.  Heart Failure Class within last 2 weeks:  No symptoms  5.  Cardiovascular Instability:  No    Prior Ischemic Workup/Eval:  Pre-Procedural Medications: Yes: ACE/ARB/ARNI, Aspirin, Beta Blockers, and STATIN  2.   Stress Test Completed?  Yes:  Stress or Imaging Studies Performed (within ANY time period):   Type:  Cardiac CTA  Results:  Positive:  Abnormal CTA/MRI Extent of Ischemia:  Intermediate    Does Patient need surgery?  Cath Valve Surgery:  No    Pre-Procedure Medical History:  Vital Signs:  BP (!) 157/74   Pulse 78   Temp 98.4 °F (36.9 °C) (Tympanic)   Resp 16   Ht 1.676 m (5' 6\")   Wt 74.4 kg (164 lb)   SpO2 99%   BMI 26.47 kg/m²     Allergies:  No Known Allergies  Medications:    Current Facility-Administered Medications   Medication Dose Route Frequency Provider Last Rate Last Admin    sodium chloride flush 0.9 % injection 5-40 mL  5-40 mL IntraVENous 2 times per day Donaldo Yee MD        sodium chloride flush 0.9 % injection 5-40 mL  5-40 mL IntraVENous PRN Donaldo Yee MD        0.9 % sodium chloride infusion   IntraVENous PRN Donaldo Yee MD        fentaNYL (SUBLIMAZE) injection   IntraVENous PRN Donaldo Yee
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
## 2025-02-20 ENCOUNTER — HOSPITAL ENCOUNTER (OUTPATIENT)
Age: 85
Setting detail: OUTPATIENT SURGERY
Discharge: HOME | End: 2025-02-20
Attending: INTERNAL MEDICINE | Admitting: INTERNAL MEDICINE
Payer: MEDICARE

## 2025-02-20 VITALS
WEIGHT: 164 LBS | OXYGEN SATURATION: 100 % | SYSTOLIC BLOOD PRESSURE: 131 MMHG | DIASTOLIC BLOOD PRESSURE: 62 MMHG | TEMPERATURE: 98.4 F | HEART RATE: 57 BPM | HEIGHT: 66 IN | RESPIRATION RATE: 16 BRPM | BODY MASS INDEX: 26.36 KG/M2

## 2025-02-20 DIAGNOSIS — R93.89 ABNORMAL COMPUTED TOMOGRAPHY ANGIOGRAPHY (CTA): ICD-10-CM

## 2025-02-20 PROBLEM — E78.2 MIXED HYPERLIPIDEMIA DUE TO TYPE 2 DIABETES MELLITUS (HCC): Status: ACTIVE | Noted: 2018-02-02

## 2025-02-20 PROBLEM — E11.69 MIXED HYPERLIPIDEMIA DUE TO TYPE 2 DIABETES MELLITUS (HCC): Status: ACTIVE | Noted: 2018-02-02

## 2025-02-20 LAB
ECHO BSA: 1.86 M2
EKG ATRIAL RATE: 100 BPM
EKG DIAGNOSIS: NORMAL
EKG P AXIS: 50 DEGREES
EKG P-R INTERVAL: 182 MS
EKG Q-T INTERVAL: 408 MS
EKG QRS DURATION: 140 MS
EKG QTC CALCULATION (BAZETT): 465 MS
EKG R AXIS: -68 DEGREES
EKG T AXIS: 21 DEGREES
EKG VENTRICULAR RATE: 78 BPM

## 2025-02-20 PROCEDURE — 93571 IV DOP VEL&/PRESS C FLO 1ST: CPT | Performed by: INTERNAL MEDICINE

## 2025-02-20 PROCEDURE — 99153 MOD SED SAME PHYS/QHP EA: CPT | Performed by: INTERNAL MEDICINE

## 2025-02-20 PROCEDURE — 7100000010 HC PHASE II RECOVERY - FIRST 15 MIN: Performed by: INTERNAL MEDICINE

## 2025-02-20 PROCEDURE — 7100000011 HC PHASE II RECOVERY - ADDTL 15 MIN: Performed by: INTERNAL MEDICINE

## 2025-02-20 PROCEDURE — C1769 GUIDE WIRE: HCPCS | Performed by: INTERNAL MEDICINE

## 2025-02-20 PROCEDURE — 2500000003 HC RX 250 WO HCPCS: Performed by: INTERNAL MEDICINE

## 2025-02-20 PROCEDURE — C1887 CATHETER, GUIDING: HCPCS | Performed by: INTERNAL MEDICINE

## 2025-02-20 PROCEDURE — 99152 MOD SED SAME PHYS/QHP 5/>YRS: CPT | Performed by: INTERNAL MEDICINE

## 2025-02-20 PROCEDURE — C1894 INTRO/SHEATH, NON-LASER: HCPCS | Performed by: INTERNAL MEDICINE

## 2025-02-20 PROCEDURE — 6360000002 HC RX W HCPCS: Performed by: INTERNAL MEDICINE

## 2025-02-20 PROCEDURE — 93458 L HRT ARTERY/VENTRICLE ANGIO: CPT | Performed by: INTERNAL MEDICINE

## 2025-02-20 PROCEDURE — 2709999900 HC NON-CHARGEABLE SUPPLY: Performed by: INTERNAL MEDICINE

## 2025-02-20 PROCEDURE — 6360000004 HC RX CONTRAST MEDICATION: Performed by: INTERNAL MEDICINE

## 2025-02-20 RX ORDER — SODIUM CHLORIDE 0.9 % (FLUSH) 0.9 %
5-40 SYRINGE (ML) INJECTION EVERY 12 HOURS SCHEDULED
Status: DISCONTINUED | OUTPATIENT
Start: 2025-02-20 | End: 2025-02-20 | Stop reason: HOSPADM

## 2025-02-20 RX ORDER — ADENOSINE 3 MG/ML
INJECTION, SOLUTION INTRAVENOUS CONTINUOUS PRN
Status: DISCONTINUED | OUTPATIENT
Start: 2025-02-20 | End: 2025-02-20 | Stop reason: HOSPADM

## 2025-02-20 RX ORDER — SODIUM CHLORIDE 0.9 % (FLUSH) 0.9 %
5-40 SYRINGE (ML) INJECTION PRN
Status: DISCONTINUED | OUTPATIENT
Start: 2025-02-20 | End: 2025-02-20 | Stop reason: HOSPADM

## 2025-02-20 RX ORDER — IOPAMIDOL 755 MG/ML
INJECTION, SOLUTION INTRAVASCULAR PRN
Status: DISCONTINUED | OUTPATIENT
Start: 2025-02-20 | End: 2025-02-20 | Stop reason: HOSPADM

## 2025-02-20 RX ORDER — FENTANYL CITRATE 50 UG/ML
INJECTION, SOLUTION INTRAMUSCULAR; INTRAVENOUS PRN
Status: DISCONTINUED | OUTPATIENT
Start: 2025-02-20 | End: 2025-02-20 | Stop reason: HOSPADM

## 2025-02-20 RX ORDER — ROSUVASTATIN CALCIUM 40 MG/1
40 TABLET, COATED ORAL DAILY
Qty: 90 TABLET | Refills: 3 | Status: SHIPPED | OUTPATIENT
Start: 2025-02-20

## 2025-02-20 RX ORDER — HEPARIN SODIUM 1000 [USP'U]/ML
INJECTION, SOLUTION INTRAVENOUS; SUBCUTANEOUS PRN
Status: DISCONTINUED | OUTPATIENT
Start: 2025-02-20 | End: 2025-02-20 | Stop reason: HOSPADM

## 2025-02-20 RX ORDER — PHENYLEPHRINE HCL IN 0.9% NACL 1 MG/10 ML
SYRINGE (ML) INTRAVENOUS PRN
Status: DISCONTINUED | OUTPATIENT
Start: 2025-02-20 | End: 2025-02-20 | Stop reason: HOSPADM

## 2025-02-20 RX ORDER — SODIUM CHLORIDE 9 MG/ML
INJECTION, SOLUTION INTRAVENOUS PRN
Status: DISCONTINUED | OUTPATIENT
Start: 2025-02-20 | End: 2025-02-20 | Stop reason: HOSPADM

## 2025-02-20 RX ORDER — NITROGLYCERIN 0.4 MG/1
0.4 TABLET SUBLINGUAL EVERY 5 MIN PRN
Qty: 25 TABLET | Refills: 3 | Status: SHIPPED | OUTPATIENT
Start: 2025-02-20

## 2025-02-20 RX ORDER — LIDOCAINE HYDROCHLORIDE 10 MG/ML
INJECTION, SOLUTION INFILTRATION; PERINEURAL PRN
Status: DISCONTINUED | OUTPATIENT
Start: 2025-02-20 | End: 2025-02-20 | Stop reason: HOSPADM

## 2025-02-20 RX ORDER — MIDAZOLAM HYDROCHLORIDE 1 MG/ML
INJECTION, SOLUTION INTRAMUSCULAR; INTRAVENOUS PRN
Status: DISCONTINUED | OUTPATIENT
Start: 2025-02-20 | End: 2025-02-20 | Stop reason: HOSPADM

## 2025-02-20 RX ORDER — ACETAMINOPHEN 325 MG/1
650 TABLET ORAL EVERY 4 HOURS PRN
Status: DISCONTINUED | OUTPATIENT
Start: 2025-02-20 | End: 2025-02-20 | Stop reason: HOSPADM

## 2025-02-20 NOTE — DISCHARGE INSTRUCTIONS
Radial Angiogram      Care of your puncture site:  Remove clear bandage 24 hours after the procedure.  May shower in 24 hours  Inspect the site daily and gently clean using soap and water.  Dry thoroughly and apply a Band-Aid.    Normal Observations:  Soreness or tenderness which may last one week.  Mild oozing from the incision site.  Possible bruising that could last 2 weeks.    Activity:  You may resume driving 24 hours following the procedure.  You may resume normal activity in 3 days or after the wound heals.  Avoid lifting more than 10 pounds for 3 days with affected arm.    Nutrition:  Regular diet .  Drink at least 8 to 10 glasses of decaffeinated, non-alcoholic fluid for the next 24 hours to flush the x-ray dye used for your angiogram out of your body.    Call your doctor immediately if your condition worsens, for any other concerns, for a follow-up appointment or if you experience any of the following:  Significant bleeding that does not stop after 10 minutes of applying firm pressure on the puncture site.  Increased swelling of the wrist.  Unusual pain, numbness, or tingling of the wrist/arm.   Any signs of infection such as: redness, yellow drainage at the site, swelling or pain.    Other Instructions:  Hold Metformin or Metformin containing drugs for 48 hours after procedure.

## 2025-02-20 NOTE — BRIEF OP NOTE
Brief Postoperative Note      Patient: London Alcocer  YOB: 1940  MRN: [id number]    Date of Procedure: 2/20/2025    Pre-Op Diagnosis Codes:      * Abnormal computed tomography angiography (CTA) [R93.89]    Post-Op Diagnosis:   1. LM-20% mid, calcified  2. LAD-80% mid, diffuse, calcified, FFR 0.79  3. D3-90% ostial, small to medium caliber vessel  4. Cx-30% proximal  5. RCA-20%, diffuse, dominant  6. LVEF 50%, LVEDP 2 mmHg  7. Recommend CV surgical consultation         Procedure(s):  Left heart cath / coronary angiography  Fractional flow reserve (FFR)    Surgeon(s):  Donaldo Yee MD    Anesthesia: IV Sedation    Estimated Blood Loss (mL): Minimal    Complications: None      Electronically signed by Donaldo Yee MD on 2/20/2025 at 10:01 AM

## 2025-02-21 ENCOUNTER — TELEPHONE (OUTPATIENT)
Dept: CARDIOLOGY CLINIC | Age: 85
End: 2025-02-21

## 2025-02-21 NOTE — TELEPHONE ENCOUNTER
D/w KJC. Recommend holding off on the gym or anything too strenuous until he is seen by surgeon. Okay for walking as long as he does not have chest pain or shortness of breath during.

## 2025-02-21 NOTE — TELEPHONE ENCOUNTER
Pt states he called to make an appt with Dr. Padilla but told that provider is to busy so he was scheduled with Dr Salas. Pt asking if that is ok. Pt also asking that it is ok that he resumed his normal gym activity.

## 2025-02-21 NOTE — TELEPHONE ENCOUNTER
Spoke with the patient and advised him of the message below. Patient voiced understanding. Call complete

## 2025-02-21 NOTE — TELEPHONE ENCOUNTER
While talking to patient he was asking if it is ok for him to resume going to the gym. Please advise

## 2025-02-21 NOTE — TELEPHONE ENCOUNTER
Dr Yee spoke directly to Dr Padilla who agreed to see patient. Would recommend he call in to let the office know so that they can reach out to Dr Padilla.

## 2025-02-27 LAB — POC ACT LR: 253 SEC

## 2025-03-07 ENCOUNTER — TELEPHONE (OUTPATIENT)
Dept: CARDIOLOGY CLINIC | Age: 85
End: 2025-03-07

## 2025-04-04 ENCOUNTER — TELEPHONE (OUTPATIENT)
Dept: FAMILY MEDICINE CLINIC | Age: 85
End: 2025-04-04

## 2025-04-04 ENCOUNTER — TELEPHONE (OUTPATIENT)
Dept: CARDIOLOGY CLINIC | Age: 85
End: 2025-04-04

## 2025-04-04 RX ORDER — HYDROXYZINE HYDROCHLORIDE 25 MG/1
25 TABLET, FILM COATED ORAL EVERY 8 HOURS PRN
Qty: 30 TABLET | Refills: 0 | Status: SHIPPED | OUTPATIENT
Start: 2025-04-04 | End: 2025-04-14

## 2025-04-04 NOTE — TELEPHONE ENCOUNTER
Pt informed  Pt is unable to sleep and eat due to anxiety  Do you have any suggestions until he gets in the office

## 2025-04-04 NOTE — TELEPHONE ENCOUNTER
Pt daughter Adriana Stanley [phone]    Released from [facility] for heart sx modified bypass 4/2/25  Would like to discuss his kidney result panels and blood level results   And also  would like to discuss his mental health   Pt is also libia for an AWV on 4/15/25 with Dr Rubio   Asking if we should keep this apt or be seen sooner     Please call Adriana to advise

## 2025-04-04 NOTE — TELEPHONE ENCOUNTER
Patient states since his CABG on 3/37 he has not had any appetite, not sleeping, \"feel like I'm having a lot of anxiety,\" tried to eat and made him nauseous.  Called his pcp and asked for anxiety meds and they were unable to help but will be seeing him for a follow up on Monday.  Patient wanting to know if Dr. Yee can give him something for anxiety or do something to help him relax.  He stated that he has not contacted the surgeons office at [facility].

## 2025-04-04 NOTE — TELEPHONE ENCOUNTER
Patient called and stated he has shirin very anxious since having heart surgery and would like to know if Dr. COTA can send in a prescription to help manage it. Please call patient to advise.

## 2025-04-04 NOTE — TELEPHONE ENCOUNTER
Called pt to confirm appt for Monday 4/7 and patient said he is not feeling well.  Patient states he has been having nausea, cannot sleep, has trouble eating and as soon as he eats he is nauseated. Patient states that he has a lot of anxiety.  Patient said that he has had these symptoms since his surgery.  Please call patient and advise.   Internal Medicine/Hospitalist    PRIMARY CARE PHYSICIAN:  Broderick Mauro MD      CHIEF COMPLAINT/REASON FOR ADMISSION/CONSULT:  Consultation for medical comanagement    HISTORY OF PRESENT ILLNESS:     Cory Andrews is a 55-year-old male patient with past medical history significant for insulin-dependent type 2 diabetes mellitus, morbid obesity on CPAP, primary hypertension, status post left above knee amputation, history of urinary retention, history of hydrocele was admitted to the hospital by Urology Service after he had Scrotoplasty, complicated, Perineoplasty and Bilateral hydrocelectomy for Concealed penis.  Patient was seen postoperatively.  Patient stated that he feels soreness on his neck as well as on his buttock area.  Denied nausea or vomiting.  Denied chest pain.  He stated that his appetite is okay.  Has been eating his dinner.    Recent labs reviewed.  Patient has glucose of 152 and the BUN of 30 and creatinine of 1.27, and recent A1c level was 6.2.  CBC recently done was unremarkable      Patient Active Problem List    Diagnosis Date Noted   • Post-operative state 11/03/2023     Priority: Low   • Recurrent incisional hernia with incarceration 11/23/2022     Priority: Low   • Incisional hernia, incarcerated 11/23/2022     Priority: Low   • Vitamin D deficiency 09/09/2022     Priority: Low   • Microalbuminuria 09/09/2022     Priority: Low   • Skin ulcer of right lower leg, limited to breakdown of skin (CMD) 08/18/2022     Priority: Low   • Suprapubic catheter (CMD)      Priority: Low   • Former smoker      Priority: Low   • Status post below knee amputation, left (CMD)      Priority: Low   • Acquired buried penis      Priority: Low   • 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)      Priority: Low   • CHRIS treated with BiPAP 02/16/2022     Priority: Low     BIPAP 21/13     • Type 2 diabetes mellitus with chronic kidney disease, without long-term current use of insulin (CMD) 02/16/2022     Priority:  Low   • Hypertension associated with stage 3a chronic kidney disease due to type 2 diabetes mellitus (CMD) 02/16/2022     Priority: Low   • Lymphedema 02/16/2022     Priority: Low   • Morbid obesity with BMI of 45.0-49.9, adult (CMD) 02/16/2022     Priority: Low     Past Medical History:   Diagnosis Date   • Abdominal hernia 25 years ago    • Acquired buried penis    • Bladder stones 06/08/2022   • Delayed emergence from anesthesia    • Employs prosthetic leg     LEFT leg, below knee   • Former smoker    • History of drug abuse in remission (CMD)     Methamphetamines   • [place] (hard of hearing)     RIGHT ear   • Hx of hydrocele    • Indwelling urinary catheter present     Supra catheter, changed 10/13/2022   • Kidney stones 05/12/2022   • Lymphedema 02/16/2022   • 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 Prajapati, 6/2022   • Colonoscopy  10/31/2022    repeat colon in 5 yrs, adenoma polyps   • Laparoscopic hydrocelectomy      OPEN   • Leg amputation      left BKA   • Suprapubic tube placement      for concealed penis   • Ventral hernia repair      in his 20s/30s in [place]      Medications Prior to Admission   Medication Sig Dispense Refill   • DULoxetine (CYMBALTA) 60 MG capsule Take 1 capsule by mouth daily. 30 capsule 2   • gabapentin (NEURONTIN) 600 MG tablet Take 1.5 tablets by mouth in the morning and 1.5 tablets at noon and 1.5 tablets in the evening. 135  tablet 2   • methocarbamol (ROBAXIN) 500 MG tablet Take 1 tablet by mouth 2 times daily as needed for Muscle spasms. 60 tablet 2   • insulin glargine (Lantus SoloStar) 100 UNIT/ML pen-injector Inject 10 Units into the skin nightly. Prime 2 units before each dose. 15 mL 5   • metoPROLOL tartrate (LOPRESSOR) 50 MG tablet Take 1 tablet by mouth in the morning and 1 tablet in the evening. 180 tablet 5   • dulaglutide (Trulicity) 4.5 MG/0.5ML pen-injector Inject 4.5 mg into the skin every 7 days. 6 mL 0   • allopurinol (ZYLOPRIM) 300 MG tablet Take 1 tablet by mouth daily. 90 tablet 0   • DISPENSE New Left Below Knee Prosthetic liners, suspension sleeves, and prosthetic socks. 2 each 0   • Cholecalciferol (Vitamin D) 50 mcg (2,000 units) tablet Take 1 tablet by mouth daily. 90 tablet 3   • lisinopril (ZESTRIL) 40 MG tablet Take 1 tablet by mouth daily. 90 tablet 3   • glyBURIDE (DIABETA) 5 MG tablet Take 1 tablet by mouth in the morning and 1 tablet in the evening. Take with meals. 180 tablet 3   • Insulin Pen Needle 32G X 6 MM Misc Use to inject Lantus and Victoza daily. Remove needle cover(s) to expose needle before injecting. 200 each 3   • pravastatin (PRAVACHOL) 40 MG tablet Take 1 tablet by mouth daily. 90 tablet 3   • blood glucose lancets Test blood sugar 4 times daily as directed. Meter: insurance preferred 400 each 3   • blood glucose test strip Test blood sugar 4 times daily as directed. Meter: insurance preferred 400 each 3   • spironolactone (ALDACTONE) 25 MG tablet Take 1 tablet by mouth daily. 90 tablet 3     ALLERGIES:   Allergen Reactions   • Hydrocodone-Acetaminophen RASH      Social History     Tobacco Use   • Smoking status: Former     Current packs/day: 0.00     Types: Cigarettes     Quit date: 10/16/2021     Years since quittin.0   • Smokeless tobacco: Never   Substance Use Topics   • Alcohol use: Yes     Comment: just a few times a year on holidays        Family History:  Family History    Problem Relation Age of Onset   • Diabetes Mother    • Dementia/Alzheimers Mother    • Cancer Mother    • Diabetes Father    • Coronary Artery Disease Father    • Hyperlipidemia Father    • Myocardial Infarction Father    • Substance Abuse Brother         methampetamine   • Diabetes Brother         Review of Systems as per HPI other 10-point organ-system reviewed which is negative other than pertinent findings in HPI.        Physical Exam:     Vitals:    11/03/23 1546   BP: 132/63   Pulse: (!) 54   Resp: 19   Temp:      I/O last 3 completed shifts:  In: 2300 [I.V.:2300]  Out: 500 [Urine:500]  I/O this shift:  In: -   Out: 640 [Urine:600; Drains:40]    General: Alert and Oriented to person, place and time. Patient is in no acute distress.   Patient is conversing freely in full sentences.    HEENT:  Normocephalic and atraumatic head., conjunctivae clear, Nasal turbinates inspected clear, no congestion bilaterally, mouth mucosa moist, on nasal oxygen during my evaluation  Neck:  No neck stiffness or tenderness.. Supple and moves in all directions   Lungs:Clear to auscultation right and left, good respiratory effort, no wheezing, no accessory respiratory use.   Cardiovascular:Normal S1 and S2, no S3, S4, no murmurs or gallops, no carotid bruit bilaterally, no JVD.  Abdomen:  +BS, soft, non-tender, non-distended.  No rebound tenderness or guarding.   Musculoskeletal:  Right lower extremity lymphedema.  Left above knee amputation.    Skin:  Erythema on his back which has been chronic as per patient, no palpable subcutaneous nodules.   Neuropsych: CN 2-12 Grossly intact.  Intact sensation to light touch all 4 extremities and face. Awake, alert, oriented in all 4 spheres (is oriented to time, place, person and situation).  No evidence of delusions or hallucinations.  Psych:  Normal affect and appropriate mood.  Provide good history.    Labs  No results found    Invalid input(s): \"CMP\", \"BMP\", \"CALCUIM\", \"GTP\"    CMP  No  results found    Invalid input(s): \"CALUCIUM\"  No results found    BMP  No results found     LAB DATA, IMAGING STUDIES AND EKG REVIEWED BY MYSELF PERSONALLY AS ABOVE AND BELOW.      Assessment and Plan:     Insulin-dependent type 2 diabetes mellitus.  Patient was found to be hypoglycemic.  Patient has been taking Lantus 10 units and Victoza every week.  Will put him on sliding scale insulin.  Carb consistent diet.  Monitor blood glucose closely.    Obstructive sleep apnea on CPAP.  Patient will continue with CPAP with home setting.    Chronic kidney disease stage 3.  His creatinine is around his baseline with recent lab check.  Monitor with BMP.  BMP in the morning.  Patient has indwelling Nugent catheter and has history of urinary retention    Morbid obesity with BMI of 46.92 with right lower extremity lymphedema.  Patient need to lose weight.    Scrotoplasty, complicated, Perineoplasty and Bilateral hydrocelectomy for Concealed penis- admitted under Urology Service.  Further recommendation as per Urology.    Primary hypertension- need to monitor blood pressure closely postoperatively.  Continue with metoprolol.  Will hold of Aldactone and lisinopril at this time.    Gout.  Continue with home dose of allopurinol     Rash on his back.  Follow up with primary care physician as an outpatient    Other chronic issue, patient will continue home medication.    Thank you for your consultation.  Will follow along with you.  If you have any question please do not hesitate to contact us.    I discussed the patient's medical condition, proposed management plan, risks, benefits and alternatives with the patient in detail and is agreeable.    Dominguez Vidal MD  Hospitalist,[place] [phone].   TEL. [phone]   FAX. [phone]

## 2025-04-04 NOTE — TELEPHONE ENCOUNTER
Spoke with patient, as we were discussing his appetite and anxiety over the phone, he received a call from [facility] pharmacy, Dr Rubio sent in a script to help with his anxiety and he will go pick it up. He denies any other symptoms, no chest pain or SOB. He will try smaller snacks / smoothies / boost while he works on getting his appetite back. He will see NPKK Monday for his follow up and give an update on how he is doing.     Tomas Lui

## 2025-04-06 NOTE — PROGRESS NOTES
[facility]     Outpatient Follow Up Note    CHIEF COMPLAINT / HPI: Hospital Follow Up secondary to CAD s/p 3/27/2025 CABG x1 Off pump- LIMA- LAD ( [facility], per Dr. Quinn, Hypertension, Hyperlipidemia, and DM    Hospital record has been reviewed  Hospital Course progressed as follows:     3/27/2025- 4/5/2025  Patient underwent a CABG on 3/27/2025 ( [facility] per Dr. Quinn) Minimally-invasive off-pump CABGx1 (LIMA-LAD), robotic LIMA harvest, left anterior mini-thoracotomy. Patient was discharge home in stable condition.     London Alcocer is 84 y.o. male who presents today for a routine follow up after a recent hospitalization related to the above mentioned issues.  Subjective:   Since the time of discharge, the patient admits their symptoms have improved.     Mr. London Alcocer has a history of coronary artery disease s/p CABG.  Additional history includes HTN, Hyperlipidemia, DM.    His wife passed away in jan. 2021.       At today's follow up visit  the patient denies significant chest pain. There is no associated dyspnea on exertion.The patient is not experiencing palpitations. These symptoms are improving over the last few weeks. Patient's friend Pamella is helping him with recovering from CABG.   With regard to medication therapy the patient has been compliant with prescribed regimen. They have tolerated therapy to date.     Past Medical History:   Diagnosis Date    ASHD (arteriosclerotic heart disease)     Basal cell carcinoma Jan., 2004 & Feb., 2007    resection of multiple , Ca lip    Bell's palsy Nov., 2003    Cardiac catheterization as cause of abnormal reaction of patient, or of later complication, without mention of misadventure at time of procedure Dec., 2011    Dr. Anglin - R-30%,Circ-nl,LAD-75%,diag-95%,EF-60%    Diverticulosis     Gastric AVM     GERD (gastroesophageal reflux disease)     HTN (hypertension)     Hyperlipidemia     Hypothyroidism 2003    Melanoma (HCC)

## 2025-04-07 ENCOUNTER — OFFICE VISIT (OUTPATIENT)
Dept: FAMILY MEDICINE CLINIC | Age: 85
End: 2025-04-07
Payer: MEDICARE

## 2025-04-07 ENCOUNTER — OFFICE VISIT (OUTPATIENT)
Dept: CARDIOLOGY CLINIC | Age: 85
End: 2025-04-07
Payer: MEDICARE

## 2025-04-07 VITALS
WEIGHT: 154 LBS | SYSTOLIC BLOOD PRESSURE: 126 MMHG | BODY MASS INDEX: 24.75 KG/M2 | HEIGHT: 66 IN | HEART RATE: 75 BPM | OXYGEN SATURATION: 96 % | DIASTOLIC BLOOD PRESSURE: 62 MMHG

## 2025-04-07 VITALS
SYSTOLIC BLOOD PRESSURE: 100 MMHG | OXYGEN SATURATION: 98 % | HEIGHT: 66 IN | DIASTOLIC BLOOD PRESSURE: 60 MMHG | HEART RATE: 68 BPM | BODY MASS INDEX: 24.77 KG/M2 | WEIGHT: 154.1 LBS

## 2025-04-07 DIAGNOSIS — E78.2 MIXED HYPERLIPIDEMIA DUE TO TYPE 2 DIABETES MELLITUS (HCC): ICD-10-CM

## 2025-04-07 DIAGNOSIS — R79.89 ELEVATED SERUM CREATININE: Primary | ICD-10-CM

## 2025-04-07 DIAGNOSIS — I25.10 ASHD (ARTERIOSCLEROTIC HEART DISEASE): ICD-10-CM

## 2025-04-07 DIAGNOSIS — I25.83 CORONARY ARTERY DISEASE DUE TO LIPID RICH PLAQUE: ICD-10-CM

## 2025-04-07 DIAGNOSIS — I10 ESSENTIAL HYPERTENSION: Primary | ICD-10-CM

## 2025-04-07 DIAGNOSIS — E11.69 MIXED HYPERLIPIDEMIA DUE TO TYPE 2 DIABETES MELLITUS (HCC): ICD-10-CM

## 2025-04-07 DIAGNOSIS — E11.649 TYPE 2 DIABETES MELLITUS WITH HYPOGLYCEMIA WITHOUT COMA, WITHOUT LONG-TERM CURRENT USE OF INSULIN (HCC): ICD-10-CM

## 2025-04-07 DIAGNOSIS — I25.10 CORONARY ARTERY DISEASE DUE TO LIPID RICH PLAQUE: ICD-10-CM

## 2025-04-07 PROCEDURE — 3078F DIAST BP <80 MM HG: CPT | Performed by: FAMILY MEDICINE

## 2025-04-07 PROCEDURE — 1123F ACP DISCUSS/DSCN MKR DOCD: CPT | Performed by: NURSE PRACTITIONER

## 2025-04-07 PROCEDURE — 3074F SYST BP LT 130 MM HG: CPT | Performed by: NURSE PRACTITIONER

## 2025-04-07 PROCEDURE — 99214 OFFICE O/P EST MOD 30 MIN: CPT | Performed by: FAMILY MEDICINE

## 2025-04-07 PROCEDURE — 3044F HG A1C LEVEL LT 7.0%: CPT | Performed by: FAMILY MEDICINE

## 2025-04-07 PROCEDURE — 3044F HG A1C LEVEL LT 7.0%: CPT | Performed by: NURSE PRACTITIONER

## 2025-04-07 PROCEDURE — 3078F DIAST BP <80 MM HG: CPT | Performed by: NURSE PRACTITIONER

## 2025-04-07 PROCEDURE — 99214 OFFICE O/P EST MOD 30 MIN: CPT | Performed by: NURSE PRACTITIONER

## 2025-04-07 PROCEDURE — 3074F SYST BP LT 130 MM HG: CPT | Performed by: FAMILY MEDICINE

## 2025-04-07 PROCEDURE — 1159F MED LIST DOCD IN RCRD: CPT | Performed by: FAMILY MEDICINE

## 2025-04-07 PROCEDURE — G2211 COMPLEX E/M VISIT ADD ON: HCPCS | Performed by: FAMILY MEDICINE

## 2025-04-07 PROCEDURE — 93000 ELECTROCARDIOGRAM COMPLETE: CPT | Performed by: NURSE PRACTITIONER

## 2025-04-07 PROCEDURE — 1123F ACP DISCUSS/DSCN MKR DOCD: CPT | Performed by: FAMILY MEDICINE

## 2025-04-07 RX ORDER — ACYCLOVIR 400 MG/1
TABLET ORAL
Qty: 3 EACH | Refills: 2 | Status: SHIPPED | OUTPATIENT
Start: 2025-04-07

## 2025-04-07 RX ORDER — METOPROLOL TARTRATE 25 MG/1
25 TABLET, FILM COATED ORAL 2 TIMES DAILY
COMMUNITY
End: 2025-04-09

## 2025-04-07 RX ORDER — ACYCLOVIR 400 MG/1
TABLET ORAL
Qty: 1 EACH | Refills: 0 | Status: SHIPPED | OUTPATIENT
Start: 2025-04-07

## 2025-04-07 RX ORDER — METOPROLOL TARTRATE 25 MG/1
25 TABLET, FILM COATED ORAL 2 TIMES DAILY
COMMUNITY
Start: 2025-04-02 | End: 2025-04-09

## 2025-04-07 SDOH — ECONOMIC STABILITY: FOOD INSECURITY: WITHIN THE PAST 12 MONTHS, THE FOOD YOU BOUGHT JUST DIDN'T LAST AND YOU DIDN'T HAVE MONEY TO GET MORE.: NEVER TRUE

## 2025-04-07 SDOH — ECONOMIC STABILITY: FOOD INSECURITY: WITHIN THE PAST 12 MONTHS, YOU WORRIED THAT YOUR FOOD WOULD RUN OUT BEFORE YOU GOT MONEY TO BUY MORE.: NEVER TRUE

## 2025-04-07 ASSESSMENT — PATIENT HEALTH QUESTIONNAIRE - PHQ9
2. FEELING DOWN, DEPRESSED OR HOPELESS: MORE THAN HALF THE DAYS
SUM OF ALL RESPONSES TO PHQ QUESTIONS 1-9: 4
1. LITTLE INTEREST OR PLEASURE IN DOING THINGS: MORE THAN HALF THE DAYS
SUM OF ALL RESPONSES TO PHQ QUESTIONS 1-9: 4

## 2025-04-07 NOTE — PROGRESS NOTES
London Alcocer (:  1940) is a 84 y.o. male,Established patient, here for evaluation of the following chief complaint(s):  Follow-Up from Hospital      Assessment & Plan   ASSESSMENT/PLAN:  London was seen today for follow-up from hospital.    Diagnoses and all orders for this visit:    Elevated serum creatinine  -     [facility] (Epic) - Traci Graves MD, Nephrology, [place]  -     US RENAL COMPLETE; Future  Elevated above baseline in hospital.   ultrasound ordered and referred to renal.   Changing from glipizide-met to jardiance  Type 2 diabetes mellitus with hypoglycemia without coma, without long-term current use of insulin (HCC)  -     Continuous Glucose Sensor (DEXCOM G7 SENSOR) MISC; Check blood sugar tidac and hs  -     Continuous Glucose  (DEXCOM G7 ) MERVIN; Check blood sugar tidac and hs  -     empagliflozin (JARDIANCE) 10 MG tablet; Take 1 tablet by mouth daily  -     [facility] (Epic) - Traci Graves MD, Nephrology, [facility]  A1c good but occasional hypoglycemia so should stop glipizide and metformin due to elevated creatinine.  Trial of jardiance       No follow-ups on file.         Subjective   SUBJECTIVE/OBJECTIVE:  HPI  Pt is a of 84 y.o. male comes in today with   Chief Complaint   Patient presents with    Follow-Up from Hospital     S/p CABG on 3/27.  One night in the hospital had hallucinations.  No recurrence.  Was worried since he saw end stage renal on his chart  Vitals:    25 1257   BP: 126/62   Pulse: 75   SpO2: 96%   Weight: 69.9 kg (154 lb)   Height: 1.676 m (5' 6\")       Past Medical History:Reviewed  Medications:Reviewed.  No Known Allergies   Social hx:Reviewed.  Social History     Tobacco Use   Smoking Status Never   Smokeless Tobacco Never        Review of Systems       Objective   Physical Exam         An electronic signature was used to authenticate this note.    --Jorge Rubio MD

## 2025-04-09 ENCOUNTER — FOLLOWUP TELEPHONE ENCOUNTER (OUTPATIENT)
Dept: CARDIOLOGY CLINIC | Age: 85
End: 2025-04-09

## 2025-04-09 ENCOUNTER — HOSPITAL ENCOUNTER (OUTPATIENT)
Age: 85
Discharge: HOME | End: 2025-04-09
Payer: MEDICARE

## 2025-04-09 ENCOUNTER — TELEPHONE (OUTPATIENT)
Dept: FAMILY MEDICINE CLINIC | Age: 85
End: 2025-04-09

## 2025-04-09 DIAGNOSIS — E11.649 TYPE 2 DIABETES MELLITUS WITH HYPOGLYCEMIA WITHOUT COMA, WITHOUT LONG-TERM CURRENT USE OF INSULIN (HCC): Primary | ICD-10-CM

## 2025-04-09 DIAGNOSIS — I25.83 CORONARY ARTERY DISEASE DUE TO LIPID RICH PLAQUE: ICD-10-CM

## 2025-04-09 DIAGNOSIS — R79.89 ELEVATED SERUM CREATININE: ICD-10-CM

## 2025-04-09 DIAGNOSIS — E11.649 TYPE 2 DIABETES MELLITUS WITH HYPOGLYCEMIA WITHOUT COMA, WITHOUT LONG-TERM CURRENT USE OF INSULIN (HCC): ICD-10-CM

## 2025-04-09 DIAGNOSIS — I25.10 CORONARY ARTERY DISEASE DUE TO LIPID RICH PLAQUE: ICD-10-CM

## 2025-04-09 LAB
ALBUMIN SERPL-MCNC: 3.9 G/DL (ref 3.4–5)
ALBUMIN/GLOB SERPL: 1.6 {RATIO} (ref 1.1–2.2)
ALP SERPL-CCNC: 97 U/L (ref 40–129)
ALT SERPL-CCNC: 52 U/L (ref 10–40)
ANION GAP SERPL CALCULATED.3IONS-SCNC: 12 MMOL/L (ref 3–16)
AST SERPL-CCNC: 35 U/L (ref 15–37)
BASOPHILS # BLD: 0.1 K/UL (ref 0–0.2)
BASOPHILS NFR BLD: 1 %
BILIRUB SERPL-MCNC: 0.5 MG/DL (ref 0–1)
BUN SERPL-MCNC: 25 MG/DL (ref 7–20)
CALCIUM SERPL-MCNC: 9 MG/DL (ref 8.3–10.6)
CHLORIDE SERPL-SCNC: 99 MMOL/L (ref 99–110)
CHOLEST SERPL-MCNC: 90 MG/DL (ref 0–199)
CO2 SERPL-SCNC: 24 MMOL/L (ref 21–32)
CREAT SERPL-MCNC: 1.4 MG/DL (ref 0.8–1.3)
DEPRECATED RDW RBC AUTO: 24 % (ref 12.4–15.4)
EOSINOPHIL # BLD: 0.7 K/UL (ref 0–0.6)
EOSINOPHIL NFR BLD: 6.7 %
GFR SERPLBLD CREATININE-BSD FMLA CKD-EPI: 50 ML/MIN/{1.73_M2}
GLUCOSE SERPL-MCNC: 175 MG/DL (ref 70–99)
HCT VFR BLD AUTO: 31 % (ref 40.5–52.5)
HDLC SERPL-MCNC: 26 MG/DL (ref 40–60)
HGB BLD-MCNC: 10.2 G/DL (ref 13.5–17.5)
LDLC SERPL CALC-MCNC: 45 MG/DL
LYMPHOCYTES # BLD: 2.1 K/UL (ref 1–5.1)
LYMPHOCYTES NFR BLD: 20 %
MAGNESIUM SERPL-MCNC: 2.08 MG/DL (ref 1.8–2.4)
MCH RBC QN AUTO: 29.8 PG (ref 26–34)
MCHC RBC AUTO-ENTMCNC: 32.8 G/DL (ref 31–36)
MCV RBC AUTO: 90.7 FL (ref 80–100)
MONOCYTES # BLD: 1 K/UL (ref 0–1.3)
MONOCYTES NFR BLD: 9.4 %
NEUTROPHILS # BLD: 6.7 K/UL (ref 1.7–7.7)
NEUTROPHILS NFR BLD: 62.9 %
PLATELET # BLD AUTO: 503 K/UL (ref 135–450)
PMV BLD AUTO: 7.6 FL (ref 5–10.5)
POTASSIUM SERPL-SCNC: 5 MMOL/L (ref 3.5–5.1)
PROT SERPL-MCNC: 6.4 G/DL (ref 6.4–8.2)
RBC # BLD AUTO: 3.42 M/UL (ref 4.2–5.9)
SODIUM SERPL-SCNC: 135 MMOL/L (ref 136–145)
TRIGL SERPL-MCNC: 95 MG/DL (ref 0–150)
VLDLC SERPL CALC-MCNC: 19 MG/DL
WBC # BLD AUTO: 10.7 K/UL (ref 4–11)

## 2025-04-09 PROCEDURE — 76770 US EXAM ABDO BACK WALL COMP: CPT

## 2025-04-09 RX ORDER — METOPROLOL TARTRATE 25 MG/1
25 TABLET, FILM COATED ORAL 2 TIMES DAILY
Qty: 60 TABLET | Refills: 0
Start: 2025-04-09 | End: 2025-04-11 | Stop reason: SDUPTHER

## 2025-04-09 NOTE — TELEPHONE ENCOUNTER
Pt wife called and states that [facility] did not recv the empagliflozin 10 mg rx   Looks like Dr Rubio has sent this med on 4/7     But they do have an order for dexcom but ins will not pay for it    Please call pt to advise

## 2025-04-09 NOTE — TELEPHONE ENCOUNTER
Medication:   Requested Prescriptions     Pending Prescriptions Disp Refills    Continuous Glucose Sensor (FREESTYLE BRITTANEY 3 PLUS SENSOR) MISC 1 each 0        Last Filled:      Pended to: [facility] [address] -  [phone] - F [phone]     Patient Phone Number: [phone] (home)     Last appt: 4/7/2025   Next appt: 4/11/2025    Last OARRS:       6/9/2023    10:24 AM   RX Monitoring   Periodic Controlled Substance Monitoring Possible medication side effects, risk of tolerance/dependence & alternative treatments discussed.;No signs of potential drug abuse or diversion identified.

## 2025-04-10 ENCOUNTER — TELEPHONE (OUTPATIENT)
Dept: CARDIOLOGY CLINIC | Age: 85
End: 2025-04-10

## 2025-04-10 DIAGNOSIS — I25.83 CORONARY ARTERY DISEASE DUE TO LIPID RICH PLAQUE: Primary | ICD-10-CM

## 2025-04-10 DIAGNOSIS — I25.10 CORONARY ARTERY DISEASE DUE TO LIPID RICH PLAQUE: Primary | ICD-10-CM

## 2025-04-10 RX ORDER — HYDROCHLOROTHIAZIDE 12.5 MG/1
CAPSULE ORAL
Qty: 2 EACH | Refills: 5 | Status: SHIPPED | OUTPATIENT
Start: 2025-04-10

## 2025-04-10 NOTE — TELEPHONE ENCOUNTER
Spoke to pt about message below he verbalized understanding. Order sent to cardiac rehab.     Sania Schneider, APRN - CNP  You1 hour ago (1:15 PM)       In EPIC, I do not see a previous carotid ultrasound. Due to CAD- he should have the carotid study.  The order for [facility] is in [facility].

## 2025-04-10 NOTE — TELEPHONE ENCOUNTER
Pt asking when he will be scheduled for Cardiac Rehab and asking why he needs to get the Vascular duplex as he just had one a couple years ago. Please advise pt.

## 2025-04-11 ENCOUNTER — OFFICE VISIT (OUTPATIENT)
Dept: FAMILY MEDICINE CLINIC | Age: 85
End: 2025-04-11
Payer: MEDICARE

## 2025-04-11 VITALS
BODY MASS INDEX: 24.69 KG/M2 | HEART RATE: 86 BPM | RESPIRATION RATE: 18 BRPM | SYSTOLIC BLOOD PRESSURE: 100 MMHG | TEMPERATURE: 97.8 F | OXYGEN SATURATION: 98 % | WEIGHT: 153 LBS | DIASTOLIC BLOOD PRESSURE: 58 MMHG

## 2025-04-11 DIAGNOSIS — Z00.00 MEDICARE ANNUAL WELLNESS VISIT, SUBSEQUENT: Primary | ICD-10-CM

## 2025-04-11 DIAGNOSIS — E11.649 TYPE 2 DIABETES MELLITUS WITH HYPOGLYCEMIA WITHOUT COMA, WITHOUT LONG-TERM CURRENT USE OF INSULIN (HCC): ICD-10-CM

## 2025-04-11 DIAGNOSIS — I10 ESSENTIAL HYPERTENSION: ICD-10-CM

## 2025-04-11 PROCEDURE — 1123F ACP DISCUSS/DSCN MKR DOCD: CPT | Performed by: FAMILY MEDICINE

## 2025-04-11 PROCEDURE — 3074F SYST BP LT 130 MM HG: CPT | Performed by: FAMILY MEDICINE

## 2025-04-11 PROCEDURE — 3078F DIAST BP <80 MM HG: CPT | Performed by: FAMILY MEDICINE

## 2025-04-11 PROCEDURE — G0439 PPPS, SUBSEQ VISIT: HCPCS | Performed by: FAMILY MEDICINE

## 2025-04-11 PROCEDURE — 3044F HG A1C LEVEL LT 7.0%: CPT | Performed by: FAMILY MEDICINE

## 2025-04-11 PROCEDURE — 1159F MED LIST DOCD IN RCRD: CPT | Performed by: FAMILY MEDICINE

## 2025-04-11 RX ORDER — METOPROLOL TARTRATE 25 MG/1
25 TABLET, FILM COATED ORAL 2 TIMES DAILY
Qty: 180 TABLET | Refills: 0 | Status: SHIPPED | OUTPATIENT
Start: 2025-04-11

## 2025-04-11 ASSESSMENT — PATIENT HEALTH QUESTIONNAIRE - PHQ9
SUM OF ALL RESPONSES TO PHQ QUESTIONS 1-9: 0
SUM OF ALL RESPONSES TO PHQ QUESTIONS 1-9: 0
2. FEELING DOWN, DEPRESSED OR HOPELESS: NOT AT ALL
1. LITTLE INTEREST OR PLEASURE IN DOING THINGS: NOT AT ALL
SUM OF ALL RESPONSES TO PHQ QUESTIONS 1-9: 0
SUM OF ALL RESPONSES TO PHQ QUESTIONS 1-9: 0

## 2025-04-11 ASSESSMENT — LIFESTYLE VARIABLES
HOW OFTEN DO YOU HAVE A DRINK CONTAINING ALCOHOL: NEVER
HOW MANY STANDARD DRINKS CONTAINING ALCOHOL DO YOU HAVE ON A TYPICAL DAY: PATIENT DOES NOT DRINK

## 2025-04-11 NOTE — PROGRESS NOTES
Medicare Annual Wellness Visit    London Alcocer is here for Medicare AWV    Assessment & Plan   London was seen today for medicare awv.    Diagnoses and all orders for this visit:    Medicare annual wellness visit, subsequent    Type 2 diabetes mellitus with hypoglycemia without coma, without long-term current use of insulin (HCC)  The current medical regimen is effective;  continue present plan and medications.   Essential hypertension  Stable on metoprolol. Continue to hold lisinopril as long as blood pressure is low  Other orders  -     metoprolol tartrate (LOPRESSOR) 25 MG tablet; Take 1 tablet by mouth 2 times daily          Return in about 3 months (around 7/11/2025).     Subjective       Patient's complete Health Risk Assessment and screening values have been reviewed and are found in Flowsheets. The following problems were reviewed today and where indicated follow up appointments were made and/or referrals ordered.    Positive Risk Factor Screenings with Interventions:    Fall Risk:  Do you feel unsteady or are you worried about falling? : no  2 or more falls in past year?: no  Fall with injury in past year?: (!) yes     Interventions:    Reviewed medications, home hazards, visual acuity, and co-morbidities that can increase risk for falls                                   Objective   Vitals:    04/11/25 1048   BP: (!) 100/58   Pulse: 86   Resp: 18   Temp: 97.8 °F (36.6 °C)   SpO2: 98%   Weight: 69.4 kg (153 lb)      Body mass index is 24.69 kg/m².        General Appearance: alert and oriented to person, place and time, well-developed and well-nourished, in no acute distress  Skin: warm and dry, no rash or erythema  Neck: neck supple and non tender without mass, no thyromegaly or thyroid nodules, no cervical lymphadenopathy   Pulmonary/Chest: clear to auscultation bilaterally- no wheezes, rales or rhonchi, normal air movement, no respiratory distress  Cardiovascular: normal rate, normal S1 and S2, no

## 2025-04-15 ENCOUNTER — HOSPITAL ENCOUNTER (OUTPATIENT)
Dept: CARDIAC REHAB | Age: 85
Setting detail: THERAPIES SERIES
Discharge: HOME | End: 2025-04-15
Payer: MEDICARE

## 2025-04-15 VITALS
HEIGHT: 66 IN | SYSTOLIC BLOOD PRESSURE: 128 MMHG | BODY MASS INDEX: 24.08 KG/M2 | DIASTOLIC BLOOD PRESSURE: 82 MMHG | WEIGHT: 149.8 LBS | HEART RATE: 76 BPM | RESPIRATION RATE: 20 BRPM

## 2025-04-15 PROCEDURE — 93798 PHYS/QHP OP CAR RHAB W/ECG: CPT

## 2025-04-15 ASSESSMENT — PATIENT HEALTH QUESTIONNAIRE - PHQ9
3. TROUBLE FALLING OR STAYING ASLEEP: SEVERAL DAYS
SUM OF ALL RESPONSES TO PHQ QUESTIONS 1-9: 2
7. TROUBLE CONCENTRATING ON THINGS, SUCH AS READING THE NEWSPAPER OR WATCHING TELEVISION: NOT AT ALL
5. POOR APPETITE OR OVEREATING: NOT AT ALL
6. FEELING BAD ABOUT YOURSELF - OR THAT YOU ARE A FAILURE OR HAVE LET YOURSELF OR YOUR FAMILY DOWN: NOT AT ALL
4. FEELING TIRED OR HAVING LITTLE ENERGY: NOT AT ALL
1. LITTLE INTEREST OR PLEASURE IN DOING THINGS: NOT AT ALL
SUM OF ALL RESPONSES TO PHQ QUESTIONS 1-9: 2
9. THOUGHTS THAT YOU WOULD BE BETTER OFF DEAD, OR OF HURTING YOURSELF: NOT AT ALL
8. MOVING OR SPEAKING SO SLOWLY THAT OTHER PEOPLE COULD HAVE NOTICED. OR THE OPPOSITE, BEING SO FIGETY OR RESTLESS THAT YOU HAVE BEEN MOVING AROUND A LOT MORE THAN USUAL: NOT AT ALL
2. FEELING DOWN, DEPRESSED OR HOPELESS: SEVERAL DAYS
SUM OF ALL RESPONSES TO PHQ QUESTIONS 1-9: 2
SUM OF ALL RESPONSES TO PHQ QUESTIONS 1-9: 2
10. IF YOU CHECKED OFF ANY PROBLEMS, HOW DIFFICULT HAVE THESE PROBLEMS MADE IT FOR YOU TO DO YOUR WORK, TAKE CARE OF THINGS AT HOME, OR GET ALONG WITH OTHER PEOPLE: NOT DIFFICULT AT ALL

## 2025-04-15 NOTE — CARDIO/PULMONARY
[facility] Cardiac Rehabilitation Initial Evaluation    London Alcocer       1940     [id number]    Share medical information:  Yes     Adriana Stanley (daughter) [phone]    Cardiac History    CABG    Physical Assessment     General Appearance   Height:  167.6 cm (5' 6\")  Weight:  67.9 kg (149 lb 12.8 oz)   BMI:  24.2  Skin color:         Cardiovascular Assessment  BP Sittin/82  Sittin/84 (right arm)  Standin/72 (right arm)  Heart rate:   76 Monitor   Heart sounds: Exceptions to WDL Regular S1, S2    Respiratory Assessment  Resp rate: 20                  SpO2:     Quality/Effort:      Sleep Apnea:  No  CPAP  No  Oxygen  No     Sleeping Habits:  sleeps average of 6-7 hours a night     Edema:  No      Orthopedic/Exercise Limitations:  No    Pain:   Do you have pain?:  no       Fall Risk Assessment     History of falling with or without injury: No  Use of ambulatory aid: No  Difficulty walking/impaired gait: No  Numbness in feet: No  Vision changes: No  Dizziness: No  Shortness of breath: No  Current medications include but not limited to: Anticoagulant, ACE, ARB, Beta  Blocker  Other fall risk : No  Outpatient fall risk intervention strategies: None of these strategies apply    Abuse / Neglect  Physical/behavioral signs of abuse/neglect   No    Do you feel safe at home   Yes    Advanced Directives  Patient has Advanced Directives:  Yes  Patient given Advanced Directive pack:  No    Vaccinations  Influenza (annual):  Yes not this year   Pneumonia:  Yes      Pt here for first session of Cardiac Rehab.Reviewed and discussed insurance benefits, pt v/u.  Reviewed Cardiac Rehab Routine and RPE scale, pt v/u.  Developed individual treatment plan and goals set with patient; pt in agreement with plan and no further questions at this time.  Performed six minute walk test.  Next visit scheduled for  25 at 10 am.     BILL MOORE RN  4/15/2025

## 2025-04-18 ENCOUNTER — HOSPITAL ENCOUNTER (OUTPATIENT)
Age: 85
Discharge: HOME | End: 2025-04-20
Payer: MEDICARE

## 2025-04-18 DIAGNOSIS — I25.10 CORONARY ARTERY DISEASE DUE TO LIPID RICH PLAQUE: ICD-10-CM

## 2025-04-18 DIAGNOSIS — I25.83 CORONARY ARTERY DISEASE DUE TO LIPID RICH PLAQUE: ICD-10-CM

## 2025-04-18 LAB
VAS LEFT CCA DIST EDV: 18.7 CM/S
VAS LEFT CCA DIST PSV: 74.7 CM/S
VAS LEFT CCA MID EDV: 19.5 CM/S
VAS LEFT CCA MID PSV: 101 CM/S
VAS LEFT CCA PROX EDV: 24.6 CM/S
VAS LEFT CCA PROX PSV: 96.8 CM/S
VAS LEFT ECA EDV: 10.2 CM/S
VAS LEFT ECA PSV: 99.3 CM/S
VAS LEFT ICA DIST EDV: 21.2 CM/S
VAS LEFT ICA DIST PSV: 60.3 CM/S
VAS LEFT ICA MID EDV: 30.6 CM/S
VAS LEFT ICA MID PSV: 93.4 CM/S
VAS LEFT ICA PROX EDV: 22.1 CM/S
VAS LEFT ICA PROX PSV: 66.2 CM/S
VAS LEFT SUBCLAVIAN PROX PSV: 151 CM/S
VAS LEFT VERTEBRAL EDV: 23.8 CM/S
VAS LEFT VERTEBRAL PSV: 73.8 CM/S
VAS RIGHT CCA DIST EDV: 19.1 CM/S
VAS RIGHT CCA DIST PSV: 72.6 CM/S
VAS RIGHT CCA MID EDV: 19.6 CM/S
VAS RIGHT CCA MID PSV: 94.9 CM/S
VAS RIGHT CCA PROX EDV: 18.5 CM/S
VAS RIGHT CCA PROX PSV: 110 CM/S
VAS RIGHT ECA PSV: 117 CM/S
VAS RIGHT ICA DIST EDV: 22.9 CM/S
VAS RIGHT ICA DIST PSV: 72.1 CM/S
VAS RIGHT ICA MID EDV: 27 CM/S
VAS RIGHT ICA MID PSV: 80.9 CM/S
VAS RIGHT ICA PROX EDV: 22.2 CM/S
VAS RIGHT ICA PROX PSV: 65.4 CM/S
VAS RIGHT SUBCLAVIAN PROX PSV: 91.7 CM/S
VAS RIGHT VERTEBRAL EDV: 13.5 CM/S
VAS RIGHT VERTEBRAL PSV: 39.1 CM/S

## 2025-04-18 PROCEDURE — 93880 EXTRACRANIAL BILAT STUDY: CPT

## 2025-04-18 PROCEDURE — 93880 EXTRACRANIAL BILAT STUDY: CPT | Performed by: INTERNAL MEDICINE

## 2025-04-21 ENCOUNTER — TELEPHONE (OUTPATIENT)
Dept: FAMILY MEDICINE CLINIC | Age: 85
End: 2025-04-21

## 2025-04-21 ENCOUNTER — HOSPITAL ENCOUNTER (OUTPATIENT)
Dept: CARDIAC REHAB | Age: 85
Setting detail: THERAPIES SERIES
Discharge: HOME | End: 2025-04-21
Payer: MEDICARE

## 2025-04-21 LAB
GLUCOSE BLD-MCNC: 126 MG/DL (ref 70–99)
GLUCOSE BLD-MCNC: 135 MG/DL (ref 70–99)
PERFORMED ON: ABNORMAL
PERFORMED ON: ABNORMAL

## 2025-04-21 PROCEDURE — 93798 PHYS/QHP OP CAR RHAB W/ECG: CPT

## 2025-04-21 RX ORDER — HYDROXYZINE HYDROCHLORIDE 25 MG/1
25 TABLET, FILM COATED ORAL EVERY 8 HOURS PRN
Qty: 30 TABLET | Refills: 0 | Status: SHIPPED | OUTPATIENT
Start: 2025-04-21 | End: 2025-05-01

## 2025-04-21 NOTE — TELEPHONE ENCOUNTER
Patient called and requested a refill of his Hydroxyzine  but I don't see it on his med list. He would also like someone to call him to about the constipation he's been experiencing.

## 2025-04-21 NOTE — TELEPHONE ENCOUNTER
Hydroxyzine sent in.  If drinking a lot of water and getting enough fiber miralax recommended as needed for constipation

## 2025-04-23 ENCOUNTER — HOSPITAL ENCOUNTER (OUTPATIENT)
Dept: CARDIAC REHAB | Age: 85
Setting detail: THERAPIES SERIES
Discharge: HOME | End: 2025-04-23
Payer: MEDICARE

## 2025-04-23 PROCEDURE — 93798 PHYS/QHP OP CAR RHAB W/ECG: CPT

## 2025-04-25 ENCOUNTER — HOSPITAL ENCOUNTER (OUTPATIENT)
Dept: CARDIAC REHAB | Age: 85
Setting detail: THERAPIES SERIES
Discharge: HOME | End: 2025-04-25
Payer: MEDICARE

## 2025-04-25 PROCEDURE — 93798 PHYS/QHP OP CAR RHAB W/ECG: CPT

## 2025-04-28 ENCOUNTER — HOSPITAL ENCOUNTER (OUTPATIENT)
Dept: CARDIAC REHAB | Age: 85
Setting detail: THERAPIES SERIES
Discharge: HOME | End: 2025-04-28
Payer: MEDICARE

## 2025-04-28 PROCEDURE — 93798 PHYS/QHP OP CAR RHAB W/ECG: CPT

## 2025-04-29 RX ORDER — BLOOD SUGAR DIAGNOSTIC
STRIP MISCELLANEOUS
Qty: 200 STRIP | Refills: 0 | Status: SHIPPED | OUTPATIENT
Start: 2025-04-29

## 2025-04-29 NOTE — TELEPHONE ENCOUNTER
Medication:   Requested Prescriptions     Pending Prescriptions Disp Refills    blood glucose test strips (ONETOUCH ULTRA) strip 200 strip 0     Sig: TEST ONCE DAILY AND AS     NEEDED FOR SYMPTOMS OF     IRREGULAR BLOOD GLUCOSE       Last Filled:  11/21/22    Patient Phone Number: There are no phone numbers on file.    Last appt: 4/11/2025   Next appt: 7/15/2025    Last Labs DM:   Lab Results   Component Value Date/Time    LABA1C 6.4 03/17/2025 01:55 PM     Last Lipid:   Lab Results   Component Value Date/Time    CHOL 90 04/09/2025 08:18 AM    TRIG 95 04/09/2025 08:18 AM    HDL 26 04/09/2025 08:18 AM    HDL 31 05/10/2012 09:05 AM     Last PSA:   Lab Results   Component Value Date/Time    PSA 1.74 03/05/2024 09:32 AM     Last Thyroid:   Lab Results   Component Value Date/Time    TSH 0.34 11/14/2024 08:52 AM    TSH 1.25 05/31/2014 05:38 AM    T4FREE 1.2 07/30/2018 08:32 AM

## 2025-04-30 ENCOUNTER — HOSPITAL ENCOUNTER (OUTPATIENT)
Dept: CARDIAC REHAB | Age: 85
Setting detail: THERAPIES SERIES
Discharge: HOME | End: 2025-04-30
Payer: MEDICARE

## 2025-04-30 ENCOUNTER — TELEPHONE (OUTPATIENT)
Dept: CARDIOLOGY CLINIC | Age: 85
End: 2025-04-30

## 2025-04-30 PROCEDURE — 93798 PHYS/QHP OP CAR RHAB W/ECG: CPT

## 2025-04-30 NOTE — TELEPHONE ENCOUNTER
Pt says that prior to sx he was taking Lisinopril but it was changed to Metoprolol. He is asking if he is to continue that or go back to Lisinopril. Please call to discuss. [phone]

## 2025-05-02 ENCOUNTER — HOSPITAL ENCOUNTER (OUTPATIENT)
Dept: CARDIAC REHAB | Age: 85
Setting detail: THERAPIES SERIES
Discharge: HOME | End: 2025-05-02
Payer: MEDICARE

## 2025-05-02 PROCEDURE — 93798 PHYS/QHP OP CAR RHAB W/ECG: CPT

## 2025-05-02 NOTE — TELEPHONE ENCOUNTER
Spoke with pt relayed  message below. Pt verbalized understanding. I schedule pt on 5/19 at 7.30am for bp check per NPKK. Pt v/u.

## 2025-05-05 ENCOUNTER — RESULTS FOLLOW-UP (OUTPATIENT)
Dept: CARDIOLOGY CLINIC | Age: 85
End: 2025-05-05

## 2025-05-05 ENCOUNTER — HOSPITAL ENCOUNTER (OUTPATIENT)
Dept: CARDIAC REHAB | Age: 85
Setting detail: THERAPIES SERIES
Discharge: HOME | End: 2025-05-05
Payer: MEDICARE

## 2025-05-05 PROCEDURE — 93798 PHYS/QHP OP CAR RHAB W/ECG: CPT

## 2025-05-05 NOTE — TELEPHONE ENCOUNTER
----- Message from RICARDO Cali CNP sent at 4/10/2025  8:44 AM EDT -----  Labs accurate results. Continue current medications.  ----- Message -----  From: [facility] Incoming Lab Results From Soft (Epic Adt)  Sent: 4/9/2025   9:15 PM EDT  To: RICARDO Panda CNP      Called and reviewed results with patient

## 2025-05-07 ENCOUNTER — HOSPITAL ENCOUNTER (OUTPATIENT)
Dept: CARDIAC REHAB | Age: 85
Setting detail: THERAPIES SERIES
Discharge: HOME | End: 2025-05-07
Payer: MEDICARE

## 2025-05-07 PROCEDURE — 93798 PHYS/QHP OP CAR RHAB W/ECG: CPT

## 2025-05-09 ENCOUNTER — HOSPITAL ENCOUNTER (OUTPATIENT)
Dept: CARDIAC REHAB | Age: 85
Setting detail: THERAPIES SERIES
Discharge: HOME | End: 2025-05-09
Payer: MEDICARE

## 2025-05-09 PROCEDURE — 93798 PHYS/QHP OP CAR RHAB W/ECG: CPT

## 2025-05-12 ENCOUNTER — HOSPITAL ENCOUNTER (OUTPATIENT)
Dept: CARDIAC REHAB | Age: 85
Setting detail: THERAPIES SERIES
End: 2025-05-12
Payer: MEDICARE

## 2025-05-14 ENCOUNTER — APPOINTMENT (OUTPATIENT)
Dept: CARDIAC REHAB | Age: 85
End: 2025-05-14
Payer: MEDICARE

## 2025-05-19 ENCOUNTER — CLINICAL SUPPORT (OUTPATIENT)
Dept: CARDIOLOGY CLINIC | Age: 85
End: 2025-05-19

## 2025-05-19 ENCOUNTER — TELEPHONE (OUTPATIENT)
Dept: CARDIOLOGY CLINIC | Age: 85
End: 2025-05-19

## 2025-05-19 VITALS — DIASTOLIC BLOOD PRESSURE: 60 MMHG | SYSTOLIC BLOOD PRESSURE: 126 MMHG

## 2025-05-19 DIAGNOSIS — I10 ESSENTIAL HYPERTENSION: Primary | ICD-10-CM

## 2025-05-27 RX ORDER — BLOOD SUGAR DIAGNOSTIC
STRIP MISCELLANEOUS
Qty: 100 STRIP | Refills: 1 | Status: SHIPPED | OUTPATIENT
Start: 2025-05-27

## 2025-05-27 RX ORDER — ROSUVASTATIN CALCIUM 40 MG/1
40 TABLET, COATED ORAL DAILY
Qty: 90 TABLET | Refills: 3 | Status: SHIPPED | OUTPATIENT
Start: 2025-05-27

## 2025-05-27 RX ORDER — METOPROLOL TARTRATE 25 MG/1
25 TABLET, FILM COATED ORAL 2 TIMES DAILY
Qty: 180 TABLET | Refills: 0 | Status: SHIPPED | OUTPATIENT
Start: 2025-05-27

## 2025-05-27 RX ORDER — COLCHICINE 0.6 MG/1
0.6 TABLET ORAL 2 TIMES DAILY PRN
Qty: 30 TABLET | Refills: 0 | Status: SHIPPED | OUTPATIENT
Start: 2025-05-27

## 2025-05-27 NOTE — TELEPHONE ENCOUNTER
Medication:   Requested Prescriptions     Pending Prescriptions Disp Refills    rosuvastatin (CRESTOR) 40 MG tablet 90 tablet 3     Sig: Take 1 tablet by mouth daily        Last Filled:  2/20/25    Patient Phone Number: There are no phone numbers on file.    Last appt: 4/11/2025   Next appt: 7/15/2025    Last OARRS:       6/9/2023    10:24 AM   RX Monitoring   Periodic Controlled Substance Monitoring Possible medication side effects, risk of tolerance/dependence & alternative treatments discussed.;No signs of potential drug abuse or diversion identified.       I believe this is given at your office

## 2025-05-27 NOTE — TELEPHONE ENCOUNTER
Medication:   Requested Prescriptions     Pending Prescriptions Disp Refills    rosuvastatin (CRESTOR) 40 MG tablet 90 tablet 3     Sig: Take 1 tablet by mouth daily    metoprolol tartrate (LOPRESSOR) 25 MG tablet 180 tablet 0     Sig: Take 1 tablet by mouth 2 times daily    colchicine (COLCRYS) 0.6 MG tablet 30 tablet 0     Sig: Take 1 tablet by mouth 2 times daily as needed (gout)       Last Filled:    10/15/24      metoprolol tartrate (LOPRESSOR) 25 MG tablet       rosuvastatin (CRESTOR) 40 MG tablet     Patient Phone Number: There are no phone numbers on file.    Last appt: 4/11/2025   Next appt: 7/15/2025    Last Labs DM:   Lab Results   Component Value Date/Time    LABA1C 6.4 03/17/2025 01:55 PM     Last Lipid:   Lab Results   Component Value Date/Time    CHOL 90 04/09/2025 08:18 AM    TRIG 95 04/09/2025 08:18 AM    HDL 26 04/09/2025 08:18 AM    HDL 31 05/10/2012 09:05 AM     Last PSA:   Lab Results   Component Value Date/Time    PSA 1.74 03/05/2024 09:32 AM     Last Thyroid:   Lab Results   Component Value Date/Time    TSH 0.34 11/14/2024 08:52 AM    TSH 1.25 05/31/2014 05:38 AM    T4FREE 1.2 07/30/2018 08:32 AM

## 2025-06-03 ENCOUNTER — TELEPHONE (OUTPATIENT)
Dept: FAMILY MEDICINE CLINIC | Age: 85
End: 2025-06-03

## 2025-06-03 DIAGNOSIS — E11.649 TYPE 2 DIABETES MELLITUS WITH HYPOGLYCEMIA WITHOUT COMA, WITHOUT LONG-TERM CURRENT USE OF INSULIN (HCC): ICD-10-CM

## 2025-06-03 NOTE — TELEPHONE ENCOUNTER
Patient called to let Dr. COTA know that since he has started on the new blood pressure med his blood sugar has been kind of high. He stated he is due for a RX refill in about a week and wanted to see if Dr. COTA wants to up his dosage like they discussed at his last appointment. Please call patient to advise.
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
## 2025-06-25 LAB — DIABETIC RETINOPATHY: NEGATIVE

## 2025-06-30 RX ORDER — METOPROLOL TARTRATE 25 MG/1
25 TABLET, FILM COATED ORAL 2 TIMES DAILY
Qty: 180 TABLET | Refills: 0 | Status: SHIPPED | OUTPATIENT
Start: 2025-06-30 | End: 2025-07-02 | Stop reason: ALTCHOICE

## 2025-06-30 NOTE — TELEPHONE ENCOUNTER
Medication:   Requested Prescriptions     Pending Prescriptions Disp Refills    metoprolol tartrate (LOPRESSOR) 25 MG tablet 180 tablet 0     Sig: Take 1 tablet by mouth 2 times daily        Last Filled:      Patient Phone Number: There are no phone numbers on file.    Last appt: 4/11/2025   Next appt: 7/15/2025    Last OARRS:       6/9/2023    10:24 AM   RX Monitoring   Periodic Controlled Substance Monitoring Possible medication side effects, risk of tolerance/dependence & alternative treatments discussed.;No signs of potential drug abuse or diversion identified.

## 2025-07-02 ENCOUNTER — OFFICE VISIT (OUTPATIENT)
Age: 85
End: 2025-07-02
Payer: MEDICARE

## 2025-07-02 VITALS
BODY MASS INDEX: 24.11 KG/M2 | WEIGHT: 150 LBS | HEIGHT: 66 IN | SYSTOLIC BLOOD PRESSURE: 120 MMHG | HEART RATE: 84 BPM | OXYGEN SATURATION: 96 % | DIASTOLIC BLOOD PRESSURE: 54 MMHG

## 2025-07-02 DIAGNOSIS — I25.83 CORONARY ARTERY DISEASE DUE TO LIPID RICH PLAQUE: ICD-10-CM

## 2025-07-02 DIAGNOSIS — E78.2 MIXED HYPERLIPIDEMIA DUE TO TYPE 2 DIABETES MELLITUS (HCC): ICD-10-CM

## 2025-07-02 DIAGNOSIS — I25.10 CORONARY ARTERY DISEASE DUE TO LIPID RICH PLAQUE: ICD-10-CM

## 2025-07-02 DIAGNOSIS — I45.10 RBBB: ICD-10-CM

## 2025-07-02 DIAGNOSIS — E11.69 MIXED HYPERLIPIDEMIA DUE TO TYPE 2 DIABETES MELLITUS (HCC): ICD-10-CM

## 2025-07-02 DIAGNOSIS — I10 ESSENTIAL HYPERTENSION: Primary | ICD-10-CM

## 2025-07-02 PROCEDURE — 99214 OFFICE O/P EST MOD 30 MIN: CPT | Performed by: INTERNAL MEDICINE

## 2025-07-02 PROCEDURE — 3078F DIAST BP <80 MM HG: CPT | Performed by: INTERNAL MEDICINE

## 2025-07-02 PROCEDURE — 3044F HG A1C LEVEL LT 7.0%: CPT | Performed by: INTERNAL MEDICINE

## 2025-07-02 PROCEDURE — 1159F MED LIST DOCD IN RCRD: CPT | Performed by: INTERNAL MEDICINE

## 2025-07-02 PROCEDURE — G2211 COMPLEX E/M VISIT ADD ON: HCPCS | Performed by: INTERNAL MEDICINE

## 2025-07-02 PROCEDURE — 3074F SYST BP LT 130 MM HG: CPT | Performed by: INTERNAL MEDICINE

## 2025-07-02 PROCEDURE — 1123F ACP DISCUSS/DSCN MKR DOCD: CPT | Performed by: INTERNAL MEDICINE

## 2025-07-02 RX ORDER — METOPROLOL SUCCINATE 50 MG/1
50 TABLET, EXTENDED RELEASE ORAL DAILY
Qty: 90 TABLET | Refills: 3
Start: 2025-07-02

## 2025-07-02 NOTE — PATIENT INSTRUCTIONS
Remind us when you run out of your metoprolol and we will send for the once a day dose of 50 mg.   See us in a year

## 2025-07-15 ENCOUNTER — OFFICE VISIT (OUTPATIENT)
Dept: FAMILY MEDICINE CLINIC | Age: 85
End: 2025-07-15
Payer: MEDICARE

## 2025-07-15 VITALS
SYSTOLIC BLOOD PRESSURE: 110 MMHG | HEART RATE: 48 BPM | OXYGEN SATURATION: 99 % | BODY MASS INDEX: 24.37 KG/M2 | WEIGHT: 151 LBS | DIASTOLIC BLOOD PRESSURE: 68 MMHG

## 2025-07-15 DIAGNOSIS — J92.9 THICKENING OF PLEURA: ICD-10-CM

## 2025-07-15 DIAGNOSIS — E11.649 TYPE 2 DIABETES MELLITUS WITH HYPOGLYCEMIA WITHOUT COMA, WITHOUT LONG-TERM CURRENT USE OF INSULIN (HCC): Primary | ICD-10-CM

## 2025-07-15 DIAGNOSIS — I10 ESSENTIAL HYPERTENSION: ICD-10-CM

## 2025-07-15 LAB
CREAT UR-MCNC: 59.3 MG/DL (ref 39–259)
HBA1C MFR BLD: 7 %
MICROALBUMIN UR DL<=1MG/L-MCNC: 9.73 MG/DL
MICROALBUMIN/CREAT UR: 164.1 MG/G (ref 0–30)

## 2025-07-15 PROCEDURE — 99214 OFFICE O/P EST MOD 30 MIN: CPT | Performed by: FAMILY MEDICINE

## 2025-07-15 PROCEDURE — 1123F ACP DISCUSS/DSCN MKR DOCD: CPT | Performed by: FAMILY MEDICINE

## 2025-07-15 PROCEDURE — 3074F SYST BP LT 130 MM HG: CPT | Performed by: FAMILY MEDICINE

## 2025-07-15 PROCEDURE — 3078F DIAST BP <80 MM HG: CPT | Performed by: FAMILY MEDICINE

## 2025-07-15 PROCEDURE — 1159F MED LIST DOCD IN RCRD: CPT | Performed by: FAMILY MEDICINE

## 2025-07-15 PROCEDURE — 83036 HEMOGLOBIN GLYCOSYLATED A1C: CPT | Performed by: FAMILY MEDICINE

## 2025-07-15 PROCEDURE — 3051F HG A1C>EQUAL 7.0%<8.0%: CPT | Performed by: FAMILY MEDICINE

## 2025-07-15 ASSESSMENT — PATIENT HEALTH QUESTIONNAIRE - PHQ9
SUM OF ALL RESPONSES TO PHQ QUESTIONS 1-9: 0
2. FEELING DOWN, DEPRESSED OR HOPELESS: NOT AT ALL
1. LITTLE INTEREST OR PLEASURE IN DOING THINGS: NOT AT ALL

## 2025-07-15 NOTE — PROGRESS NOTES
London Alcocer (:  1940) is a 84 y.o. male,Established patient, here for evaluation of the following chief complaint(s):  Diabetes and Hypertension      Assessment & Plan   ASSESSMENT/PLAN:  London was seen today for diabetes and hypertension.    Diagnoses and all orders for this visit:    Type 2 diabetes mellitus with hypoglycemia without coma, without long-term current use of insulin (HCC)  -     Albumin/Creatinine Ratio, Urine  -     POCT glycosylated hemoglobin (Hb A1C)  The current medical regimen is effective;  continue present plan and medications.   Thickening of pleura  -     CT CHEST WO CONTRAST; Future  Ct ordered to follow.  asymptomatic  Essential hypertension  The current medical regimen is effective;  continue present plan and medications.        No follow-ups on file.         Subjective   SUBJECTIVE/OBJECTIVE:  HPI  Pt is a of 84 y.o. male comes in today with   Chief Complaint   Patient presents with    Diabetes    Hypertension     Here for follow up dm.  Sugars usually 130-140. No hypoglycemia.  Sometimes night up to 180.  Vitals:    07/15/25 0906   BP: 110/68   BP Site: Left Upper Arm   Patient Position: Sitting   BP Cuff Size: Large Adult   Pulse: (!) 48   SpO2: 99%   Weight: 68.5 kg (151 lb)       Review of Systems       Objective   Physical Exam  Constitutional:       General: He is not in acute distress.     Appearance: Normal appearance. He is well-developed. He is not diaphoretic.   HENT:      Head: Normocephalic and atraumatic.      Mouth/Throat:      Mouth: Mucous membranes are moist.      Pharynx: Oropharynx is clear.   Eyes:      General: No scleral icterus.  Neck:      Thyroid: No thyromegaly.      Trachea: No tracheal deviation.   Cardiovascular:      Rate and Rhythm: Normal rate and regular rhythm.      Heart sounds: Normal heart sounds. No murmur heard.  Pulmonary:      Effort: Pulmonary effort is normal.      Breath sounds: Normal breath sounds. No wheezing or rales.

## 2025-07-30 RX ORDER — BLOOD SUGAR DIAGNOSTIC
STRIP MISCELLANEOUS
Qty: 100 STRIP | Refills: 1 | Status: SHIPPED | OUTPATIENT
Start: 2025-07-30 | End: 2025-07-30

## 2025-07-30 RX ORDER — BLOOD SUGAR DIAGNOSTIC
STRIP MISCELLANEOUS
Qty: 200 STRIP | Refills: 0 | Status: SHIPPED | OUTPATIENT
Start: 2025-07-30

## 2025-07-30 NOTE — TELEPHONE ENCOUNTER
Medication:   Requested Prescriptions     Pending Prescriptions Disp Refills    ONETOUCH ULTRA strip [Pharmacy Med Name: ONETOUCH ULTRA  STRP] 100 strip 1     Sig: USE AS DIRECTED TO TEST BLOOD SUGAR TWO TIMES A DAY       Last Filled:  5/27/25    Patient Phone Number: [phone] (home)     Last appt: 7/15/2025   Next appt: 10/15/2025    Last Labs DM:   Lab Results   Component Value Date/Time    LABA1C 7.0 07/15/2025 09:20 AM     Last Lipid:   Lab Results   Component Value Date/Time    CHOL 90 04/09/2025 08:18 AM    TRIG 95 04/09/2025 08:18 AM    HDL 26 04/09/2025 08:18 AM    HDL 31 05/10/2012 09:05 AM     Last PSA:   Lab Results   Component Value Date/Time    PSA 1.74 03/05/2024 09:32 AM     Last Thyroid:   Lab Results   Component Value Date/Time    TSH 0.34 11/14/2024 08:52 AM    TSH 1.25 05/31/2014 05:38 AM    T4FREE 1.2 07/30/2018 08:32 AM

## 2025-07-30 NOTE — TELEPHONE ENCOUNTER
Pharmacy comment: ACCU-CHEK PREFERRED PER INSURANCE PLEASE RESEND OVER METER, TEST STRIPS, AND LANCETS. CALL US [phone] WITH QUESTIONS.  ACCU-CHEK PREFERRED PER INSURANCE PLEASE RESEND OVER METER, TEST STRIPS, AND LANCETS. CALL US [phone] WITH QUESTIONS.

## 2025-07-30 NOTE — TELEPHONE ENCOUNTER
Medication:   Requested Prescriptions     Pending Prescriptions Disp Refills    ACCU-CHEK GUIDE strip [Pharmacy Med Name: ACCU-CHEK GUIDE TEST  STRP]       Sig: BID       Last Filled:  7/30/25Duplicate request.    Patient Phone Number: [phone] (home)     Last appt: 7/15/2025   Next appt: 10/15/2025    Last Labs DM:   Lab Results   Component Value Date/Time    LABA1C 7.0 07/15/2025 09:20 AM     Last Lipid:   Lab Results   Component Value Date/Time    CHOL 90 04/09/2025 08:18 AM    TRIG 95 04/09/2025 08:18 AM    HDL 26 04/09/2025 08:18 AM    HDL 31 05/10/2012 09:05 AM     Last PSA:   Lab Results   Component Value Date/Time    PSA 1.74 03/05/2024 09:32 AM     Last Thyroid:   Lab Results   Component Value Date/Time    TSH 0.34 11/14/2024 08:52 AM    TSH 1.25 05/31/2014 05:38 AM    T4FREE 1.2 07/30/2018 08:32 AM

## 2025-08-04 RX ORDER — ALLOPURINOL 100 MG/1
TABLET ORAL
Qty: 45 TABLET | Refills: 3 | Status: SHIPPED | OUTPATIENT
Start: 2025-08-04

## 2025-08-11 RX ORDER — BLOOD SUGAR DIAGNOSTIC
STRIP MISCELLANEOUS
Qty: 200 STRIP | Refills: 0 | Status: SHIPPED | OUTPATIENT
Start: 2025-08-11

## (undated) DEVICE — CATHETER DIAG L100CM DIA5.2FR 0.038IN POLYUR COR JR4 STD

## (undated) DEVICE — CATHETER ANGIO 5FR L100CM GRY S STL NYL JL3.5 3 SEG BRAID L

## (undated) DEVICE — PAD, DEFIB, ADULT, RADIOTRANS, PHYSIO: Brand: MEDLINE

## (undated) DEVICE — KIT AT-X65 ANGIOTOUCH HAND CONTROLLER

## (undated) DEVICE — HI-TORQUE VERSACORE STANDARD GUIDE WIRE SYSTEM 300 CM: Brand: HI-TORQUE VERSACORE

## (undated) DEVICE — GLIDESHEATH SLENDER ACCESS KIT: Brand: GLIDESHEATH SLENDER

## (undated) DEVICE — Device: Brand: NOMOLINE™ LH ADULT NASAL CO2 CANNULA WITH O2 4M

## (undated) DEVICE — TR BAND RADIAL ARTERY COMPRESSION DEVICE: Brand: TR BAND

## (undated) DEVICE — DRAPE,ANGIO,BRACH,STERILE,38X44: Brand: MEDLINE

## (undated) DEVICE — CATH LAB PACK: Brand: MEDLINE INDUSTRIES, INC.

## (undated) DEVICE — CATHETER GUIDE AD 6FR L100CM COR PERIPH GRN NYL PTFE XB L 3

## (undated) DEVICE — PRESSURE GUIDEWIRE: Brand: COMET™ II